# Patient Record
Sex: FEMALE | Race: WHITE | NOT HISPANIC OR LATINO | Employment: OTHER | URBAN - METROPOLITAN AREA
[De-identification: names, ages, dates, MRNs, and addresses within clinical notes are randomized per-mention and may not be internally consistent; named-entity substitution may affect disease eponyms.]

---

## 2019-02-20 ENCOUNTER — HOSPITAL ENCOUNTER (INPATIENT)
Facility: HOSPITAL | Age: 79
LOS: 2 days | Discharge: HOME HEALTH | End: 2019-02-22
Attending: EMERGENCY MEDICINE | Admitting: INTERNAL MEDICINE

## 2019-02-20 ENCOUNTER — APPOINTMENT (OUTPATIENT)
Dept: CT IMAGING | Facility: HOSPITAL | Age: 79
End: 2019-02-20

## 2019-02-20 ENCOUNTER — APPOINTMENT (OUTPATIENT)
Dept: GENERAL RADIOLOGY | Facility: HOSPITAL | Age: 79
End: 2019-02-20

## 2019-02-20 DIAGNOSIS — Z74.09 IMPAIRED FUNCTIONAL MOBILITY, BALANCE, GAIT, AND ENDURANCE: ICD-10-CM

## 2019-02-20 DIAGNOSIS — R09.02 HYPOXIA: ICD-10-CM

## 2019-02-20 DIAGNOSIS — J18.9 PNEUMONIA OF RIGHT LOWER LOBE DUE TO INFECTIOUS ORGANISM: ICD-10-CM

## 2019-02-20 DIAGNOSIS — I50.9 ACUTE ON CHRONIC CONGESTIVE HEART FAILURE, UNSPECIFIED HEART FAILURE TYPE (HCC): Primary | ICD-10-CM

## 2019-02-20 DIAGNOSIS — Z74.09 IMPAIRED MOBILITY AND ADLS: ICD-10-CM

## 2019-02-20 DIAGNOSIS — Z78.9 IMPAIRED MOBILITY AND ADLS: ICD-10-CM

## 2019-02-20 PROBLEM — K76.89 LIVER NODULE: Status: ACTIVE | Noted: 2019-02-20

## 2019-02-20 PROBLEM — I10 ESSENTIAL HYPERTENSION: Status: ACTIVE | Noted: 2019-02-20

## 2019-02-20 PROBLEM — I25.10 CAD (CORONARY ARTERY DISEASE): Status: ACTIVE | Noted: 2019-02-20

## 2019-02-20 PROBLEM — I48.91 A-FIB: Status: ACTIVE | Noted: 2019-02-20

## 2019-02-20 PROBLEM — J90 PLEURAL EFFUSION, BILATERAL: Status: ACTIVE | Noted: 2019-02-20

## 2019-02-20 PROBLEM — I31.39 PERICARDIAL EFFUSION: Status: ACTIVE | Noted: 2019-02-20

## 2019-02-20 PROBLEM — R82.71 BACTERIURIA: Status: ACTIVE | Noted: 2019-02-20

## 2019-02-20 LAB
ALBUMIN SERPL-MCNC: 4.23 G/DL (ref 3.2–4.8)
ALBUMIN/GLOB SERPL: 1.6 G/DL (ref 1.5–2.5)
ALP SERPL-CCNC: 89 U/L (ref 25–100)
ALT SERPL W P-5'-P-CCNC: 19 U/L (ref 7–40)
ANION GAP SERPL CALCULATED.3IONS-SCNC: 5 MMOL/L (ref 3–11)
AST SERPL-CCNC: 18 U/L (ref 0–33)
BACTERIA UR QL AUTO: ABNORMAL /HPF
BASOPHILS # BLD AUTO: 0.04 10*3/MM3 (ref 0–0.2)
BASOPHILS NFR BLD AUTO: 0.8 % (ref 0–1)
BILIRUB SERPL-MCNC: 2 MG/DL (ref 0.3–1.2)
BILIRUB UR QL STRIP: NEGATIVE
BNP SERPL-MCNC: 546 PG/ML (ref 0–100)
BUN BLD-MCNC: 14 MG/DL (ref 9–23)
BUN/CREAT SERPL: 17.1 (ref 7–25)
CALCIUM SPEC-SCNC: 10 MG/DL (ref 8.7–10.4)
CHLORIDE SERPL-SCNC: 106 MMOL/L (ref 99–109)
CLARITY UR: CLEAR
CO2 SERPL-SCNC: 26 MMOL/L (ref 20–31)
COLOR UR: YELLOW
CREAT BLD-MCNC: 0.82 MG/DL (ref 0.6–1.3)
D-LACTATE SERPL-SCNC: 1.1 MMOL/L (ref 0.5–2)
DEPRECATED RDW RBC AUTO: 56.4 FL (ref 37–54)
EOSINOPHIL # BLD AUTO: 0.01 10*3/MM3 (ref 0–0.3)
EOSINOPHIL NFR BLD AUTO: 0.2 % (ref 0–3)
ERYTHROCYTE [DISTWIDTH] IN BLOOD BY AUTOMATED COUNT: 15.1 % (ref 11.3–14.5)
FLUAV AG NPH QL: NEGATIVE
FLUBV AG NPH QL IA: NEGATIVE
GFR SERPL CREATININE-BSD FRML MDRD: 68 ML/MIN/1.73
GLOBULIN UR ELPH-MCNC: 2.6 GM/DL
GLUCOSE BLD-MCNC: 108 MG/DL (ref 70–100)
GLUCOSE UR STRIP-MCNC: NEGATIVE MG/DL
HCT VFR BLD AUTO: 41 % (ref 34.5–44)
HGB BLD-MCNC: 13.4 G/DL (ref 11.5–15.5)
HGB UR QL STRIP.AUTO: ABNORMAL
HOLD SPECIMEN: NORMAL
HOLD SPECIMEN: NORMAL
HYALINE CASTS UR QL AUTO: ABNORMAL /LPF
IMM GRANULOCYTES # BLD AUTO: 0.01 10*3/MM3 (ref 0–0.05)
IMM GRANULOCYTES NFR BLD AUTO: 0.2 % (ref 0–0.6)
INR PPP: 1.81 (ref 0.85–1.16)
KETONES UR QL STRIP: NEGATIVE
LEUKOCYTE ESTERASE UR QL STRIP.AUTO: NEGATIVE
LYMPHOCYTES # BLD AUTO: 0.53 10*3/MM3 (ref 0.6–4.8)
LYMPHOCYTES NFR BLD AUTO: 10.8 % (ref 24–44)
MAGNESIUM SERPL-MCNC: 1.9 MG/DL (ref 1.3–2.7)
MCH RBC QN AUTO: 33.1 PG (ref 27–31)
MCHC RBC AUTO-ENTMCNC: 32.7 G/DL (ref 32–36)
MCV RBC AUTO: 101.2 FL (ref 80–99)
MONOCYTES # BLD AUTO: 0.3 10*3/MM3 (ref 0–1)
MONOCYTES NFR BLD AUTO: 6.1 % (ref 0–12)
NEUTROPHILS # BLD AUTO: 4.01 10*3/MM3 (ref 1.5–8.3)
NEUTROPHILS NFR BLD AUTO: 82.1 % (ref 41–71)
NITRITE UR QL STRIP: NEGATIVE
PH UR STRIP.AUTO: <=5 [PH] (ref 5–8)
PLATELET # BLD AUTO: 121 10*3/MM3 (ref 150–450)
PMV BLD AUTO: 10.7 FL (ref 6–12)
POTASSIUM BLD-SCNC: 3.8 MMOL/L (ref 3.5–5.5)
PROCALCITONIN SERPL-MCNC: 0.05 NG/ML
PROT SERPL-MCNC: 6.8 G/DL (ref 5.7–8.2)
PROT UR QL STRIP: NEGATIVE
PROTHROMBIN TIME: 20.2 SECONDS (ref 11.2–14.3)
RBC # BLD AUTO: 4.05 10*6/MM3 (ref 3.89–5.14)
RBC # UR: ABNORMAL /HPF
REF LAB TEST METHOD: ABNORMAL
SODIUM BLD-SCNC: 137 MMOL/L (ref 132–146)
SP GR UR STRIP: 1.02 (ref 1–1.03)
SQUAMOUS #/AREA URNS HPF: ABNORMAL /HPF
TROPONIN I SERPL-MCNC: 0.01 NG/ML (ref 0–0.07)
UROBILINOGEN UR QL STRIP: ABNORMAL
WBC NRBC COR # BLD: 4.89 10*3/MM3 (ref 3.5–10.8)
WBC UR QL AUTO: ABNORMAL /HPF
WHOLE BLOOD HOLD SPECIMEN: NORMAL
WHOLE BLOOD HOLD SPECIMEN: NORMAL

## 2019-02-20 PROCEDURE — 87804 INFLUENZA ASSAY W/OPTIC: CPT | Performed by: EMERGENCY MEDICINE

## 2019-02-20 PROCEDURE — 99285 EMERGENCY DEPT VISIT HI MDM: CPT

## 2019-02-20 PROCEDURE — 84145 PROCALCITONIN (PCT): CPT | Performed by: EMERGENCY MEDICINE

## 2019-02-20 PROCEDURE — 94799 UNLISTED PULMONARY SVC/PX: CPT

## 2019-02-20 PROCEDURE — 93005 ELECTROCARDIOGRAM TRACING: CPT | Performed by: EMERGENCY MEDICINE

## 2019-02-20 PROCEDURE — 81001 URINALYSIS AUTO W/SCOPE: CPT | Performed by: EMERGENCY MEDICINE

## 2019-02-20 PROCEDURE — 25010000002 CEFTRIAXONE PER 250 MG: Performed by: EMERGENCY MEDICINE

## 2019-02-20 PROCEDURE — 99223 1ST HOSP IP/OBS HIGH 75: CPT | Performed by: INTERNAL MEDICINE

## 2019-02-20 PROCEDURE — 83880 ASSAY OF NATRIURETIC PEPTIDE: CPT | Performed by: EMERGENCY MEDICINE

## 2019-02-20 PROCEDURE — 83605 ASSAY OF LACTIC ACID: CPT | Performed by: EMERGENCY MEDICINE

## 2019-02-20 PROCEDURE — 83735 ASSAY OF MAGNESIUM: CPT

## 2019-02-20 PROCEDURE — 85025 COMPLETE CBC W/AUTO DIFF WBC: CPT

## 2019-02-20 PROCEDURE — 71250 CT THORAX DX C-: CPT

## 2019-02-20 PROCEDURE — 25010000002 FUROSEMIDE PER 20 MG: Performed by: EMERGENCY MEDICINE

## 2019-02-20 PROCEDURE — 87040 BLOOD CULTURE FOR BACTERIA: CPT | Performed by: EMERGENCY MEDICINE

## 2019-02-20 PROCEDURE — 80053 COMPREHEN METABOLIC PANEL: CPT

## 2019-02-20 PROCEDURE — 25010000002 AZITHROMYCIN: Performed by: EMERGENCY MEDICINE

## 2019-02-20 PROCEDURE — 85610 PROTHROMBIN TIME: CPT | Performed by: EMERGENCY MEDICINE

## 2019-02-20 PROCEDURE — 71045 X-RAY EXAM CHEST 1 VIEW: CPT

## 2019-02-20 PROCEDURE — 93005 ELECTROCARDIOGRAM TRACING: CPT

## 2019-02-20 PROCEDURE — 84484 ASSAY OF TROPONIN QUANT: CPT

## 2019-02-20 RX ORDER — BACLOFEN 10 MG/1
10 TABLET ORAL 3 TIMES DAILY
COMMUNITY

## 2019-02-20 RX ORDER — WARFARIN SODIUM 5 MG/1
5 TABLET ORAL
COMMUNITY
End: 2022-08-04

## 2019-02-20 RX ORDER — FUROSEMIDE 10 MG/ML
40 INJECTION INTRAMUSCULAR; INTRAVENOUS EVERY 12 HOURS
Status: DISCONTINUED | OUTPATIENT
Start: 2019-02-21 | End: 2019-02-21

## 2019-02-20 RX ORDER — ALPRAZOLAM 0.5 MG/1
0.5 TABLET ORAL DAILY PRN
COMMUNITY

## 2019-02-20 RX ORDER — IPRATROPIUM BROMIDE AND ALBUTEROL SULFATE 2.5; .5 MG/3ML; MG/3ML
3 SOLUTION RESPIRATORY (INHALATION) ONCE
Status: COMPLETED | OUTPATIENT
Start: 2019-02-20 | End: 2019-02-20

## 2019-02-20 RX ORDER — CEFTRIAXONE SODIUM 1 G/50ML
1 INJECTION, SOLUTION INTRAVENOUS EVERY 24 HOURS
Status: DISCONTINUED | OUTPATIENT
Start: 2019-02-21 | End: 2019-02-22 | Stop reason: HOSPADM

## 2019-02-20 RX ORDER — SODIUM CHLORIDE 0.9 % (FLUSH) 0.9 %
3-10 SYRINGE (ML) INJECTION AS NEEDED
Status: DISCONTINUED | OUTPATIENT
Start: 2019-02-20 | End: 2019-02-22 | Stop reason: HOSPADM

## 2019-02-20 RX ORDER — ALPRAZOLAM 0.5 MG/1
0.5 TABLET ORAL DAILY PRN
Status: DISCONTINUED | OUTPATIENT
Start: 2019-02-20 | End: 2019-02-22 | Stop reason: HOSPADM

## 2019-02-20 RX ORDER — FUROSEMIDE 10 MG/ML
20 INJECTION INTRAMUSCULAR; INTRAVENOUS ONCE
Status: COMPLETED | OUTPATIENT
Start: 2019-02-20 | End: 2019-02-20

## 2019-02-20 RX ORDER — AMIODARONE HYDROCHLORIDE 200 MG/1
200 TABLET ORAL DAILY
Status: DISCONTINUED | OUTPATIENT
Start: 2019-02-21 | End: 2019-02-22 | Stop reason: HOSPADM

## 2019-02-20 RX ORDER — SODIUM CHLORIDE 0.9 % (FLUSH) 0.9 %
3 SYRINGE (ML) INJECTION EVERY 12 HOURS SCHEDULED
Status: DISCONTINUED | OUTPATIENT
Start: 2019-02-21 | End: 2019-02-22 | Stop reason: HOSPADM

## 2019-02-20 RX ORDER — NYSTATIN 100000 U/G
CREAM TOPICAL 2 TIMES DAILY
COMMUNITY
End: 2022-08-04

## 2019-02-20 RX ORDER — FUROSEMIDE 40 MG/1
40 TABLET ORAL 3 TIMES WEEKLY
COMMUNITY
End: 2022-08-04

## 2019-02-20 RX ORDER — CEFTRIAXONE SODIUM 1 G/50ML
1 INJECTION, SOLUTION INTRAVENOUS ONCE
Status: COMPLETED | OUTPATIENT
Start: 2019-02-20 | End: 2019-02-20

## 2019-02-20 RX ORDER — SODIUM CHLORIDE 0.9 % (FLUSH) 0.9 %
10 SYRINGE (ML) INJECTION AS NEEDED
Status: DISCONTINUED | OUTPATIENT
Start: 2019-02-20 | End: 2019-02-22 | Stop reason: HOSPADM

## 2019-02-20 RX ORDER — AMIODARONE HYDROCHLORIDE 200 MG/1
200 TABLET ORAL DAILY
COMMUNITY
End: 2022-08-04

## 2019-02-20 RX ORDER — WARFARIN SODIUM 5 MG/1
5 TABLET ORAL
Status: DISCONTINUED | OUTPATIENT
Start: 2019-02-21 | End: 2019-02-21

## 2019-02-20 RX ORDER — LOSARTAN POTASSIUM 50 MG/1
50 TABLET ORAL DAILY
COMMUNITY
End: 2022-08-04

## 2019-02-20 RX ORDER — BACLOFEN 10 MG/1
10 TABLET ORAL 3 TIMES DAILY
Status: DISCONTINUED | OUTPATIENT
Start: 2019-02-21 | End: 2019-02-22 | Stop reason: HOSPADM

## 2019-02-20 RX ORDER — LOSARTAN POTASSIUM 50 MG/1
50 TABLET ORAL DAILY
Status: DISCONTINUED | OUTPATIENT
Start: 2019-02-21 | End: 2019-02-22 | Stop reason: HOSPADM

## 2019-02-20 RX ORDER — ACETAMINOPHEN,DIPHENHYDRAMINE HCL 500; 25 MG/1; MG/1
1 TABLET, FILM COATED ORAL NIGHTLY PRN
COMMUNITY

## 2019-02-20 RX ADMIN — CEFTRIAXONE SODIUM 1 G: 1 INJECTION, SOLUTION INTRAVENOUS at 20:34

## 2019-02-20 RX ADMIN — AZITHROMYCIN MONOHYDRATE 500 MG: 500 INJECTION, POWDER, LYOPHILIZED, FOR SOLUTION INTRAVENOUS at 21:18

## 2019-02-20 RX ADMIN — FUROSEMIDE 20 MG: 10 INJECTION, SOLUTION INTRAMUSCULAR; INTRAVENOUS at 21:13

## 2019-02-20 RX ADMIN — IPRATROPIUM BROMIDE AND ALBUTEROL SULFATE 3 ML: 2.5; .5 SOLUTION RESPIRATORY (INHALATION) at 19:11

## 2019-02-20 NOTE — ED PROVIDER NOTES
Subjective   Evelyn Rogers is a pleasant 78 y.o.female who is a patient of Dr. Powell, Dr. Rubio and Dr. 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 She states that she is previously from [place] and moved to [place] 14 months ago. She lives alone with her cat but her son lives in [place] and works at the [employer]. She states she was last able to go to the store 3 months ago and currently has to hold onto objects while walking room to room in her home but denies the use of a walker or cane. She denies a h/o HEAVEN on CPAP or oxygen use. She denies a h/o COPD but reports a h/o pleural effusion requiring drainage. She states she sees Dr. Thorpe once a month for her anxiety and is on xanax.        She presents to the ED with c/o fatigue with onset 3 weeks ago that has progressively worsened. She reports she developed dysuria and was diagnosed with a UTI 3 weeks ago. She saw Dr. Rubio who she states started her on 3 powerful antibiotics that made her infection worse. She tried using an known prescription substance that she tried to insert into her vagina but was unable to due to stricture so she told Dr. Avina who did a scope of her vagina but she does not recall the results of the scope. She declined additional antibiotic use and has been using an unknown prescription cream that she states has been helping her burning pain to the sides of her vagina.     She developed a cough and congestion 2 weeks ago that has been progressively worsening and significantly worsened last night. She experiences a productive pinkish-yellow sputum production. She has experienced fevers and chills for a week and her temperature is elevated today. She is unsure if she received a flu shot.    She states she has experienced anxiety in the mornings which is not unusual for her but has developed headaches which is new for her. She experiences shortness of  breath and states she can hear crackles occasionally. She reports a h/o left lower extremity after removal of an osteosarcoma 60 years ago.     She experiences intermittent diarrhea and constipation but denies any melena or hematochezia. She experiences right flank pain along with nausea but denies any vomiting or hematemesis.     She reports a h/o lupus but no long sees rheumatology or nephrology. She reports taking steroids many years ago but denies any recent steroid use. She does experience rashes, arthralgias and myalgias.     She states that she had a large hernia where her lower organ were pushed up into her stomach during pregnancy and had surgery in [place] many years ago to correct this.     All other systems have reviewed and are negative unless as noted above.           History provided by:  Patient  Fatigue   Location:  Generalized  Quality:  Fatigue  Severity:  Moderate  Onset quality:  Gradual  Duration:  2 weeks  Timing:  Constant  Progression:  Worsening  Chronicity:  New  Context:  Recent UTI abx not effective, worsening cough, congestion, fevers, chills, decreased appetite and intake  Relieved by:  None  Worsened by:  None  Ineffective treatments:  ABX, rest  Associated symptoms: congestion, cough, diarrhea, fatigue, fever, rhinorrhea and shortness of breath    Associated symptoms: no abdominal pain, no chest pain, no nausea and no vomiting    Risk factors:  Lupus      Review of Systems   Constitutional: Positive for appetite change, chills, fatigue and fever.   HENT: Positive for congestion and rhinorrhea.    Respiratory: Positive for cough and shortness of breath.    Cardiovascular: Negative for chest pain.   Gastrointestinal: Positive for constipation and diarrhea. Negative for abdominal pain, nausea and vomiting.   Genitourinary: Positive for dysuria.   All other systems reviewed and are negative.      No past medical history on file.    Allergies   Allergen Reactions   • Other Unknown (See  Comments)     SOMETHING THAT SHE GOT DURING AN MRI       No past surgical history on file.    No family history on file.    Social History     Socioeconomic History   • Marital status:      Spouse name: Not on file   • Number of children: Not on file   • Years of education: Not on file   • Highest education level: Not on file         Objective   Physical Exam   Constitutional: She is oriented to person, place, and time. She appears well-developed and well-nourished. No distress.   Alert, oriented and in no acute distress.    HENT:   Head: Normocephalic and atraumatic.   Right Ear: External ear normal.   Left Ear: External ear normal.   Nose: Nose normal.   Mouth/Throat: Oropharynx is clear and moist.   Uvula midline, normal oropharynx and nasal pharynx.    Eyes: Conjunctivae and EOM are normal. Pupils are equal, round, and reactive to light. Right eye exhibits no discharge. Left eye exhibits no discharge. No scleral icterus.   Neck: Normal range of motion. Neck supple. No JVD present. Carotid bruit is not present.   No adenopathy, JVD, bruits or thyromegaly.    Cardiovascular: Normal rate, regular rhythm and intact distal pulses. Exam reveals distant heart sounds. Exam reveals no gallop and no friction rub.   No murmur heard.  Regular rate and rhythm. Distant heart sounds. No murmurs, rubs, gallops or heaves.    Pulmonary/Chest: Effort normal. No stridor. No respiratory distress. She has no wheezes. She has no rales.   Bibasilar crackles worse on the left than right.     Abdominal: Soft. Bowel sounds are normal. She exhibits no distension and no mass. There is no tenderness. There is no rebound and no guarding.   Positive bowel sounds, soft, non tender. No organomegaly or hepatosplenomegaly.   Flanks are without masses or rashes.   Mildly obese.    Musculoskeletal: Normal range of motion. She exhibits edema. She exhibits no tenderness.   Trace edema with left worse than right which is chronic per the  patient.   No rash, synovitis.   Axilla is without rashes or masses.    Lymphadenopathy:     She has no cervical adenopathy.   Neurological: She is alert and oriented to person, place, and time. No cranial nerve deficit or sensory deficit. Coordination normal.   Face symmetric, tongue midline, voice strong, hearing, vision and speech preserved. Equal strength to upper and lower extremities. Sensation intact.   Mild global weakness.    Skin: Skin is warm and dry. Capillary refill takes less than 2 seconds. No rash noted. She is not diaphoretic. No erythema.   Psychiatric: She has a normal mood and affect. Her behavior is normal.   Nursing note and vitals reviewed.      Procedures         ED Course      Recent Results (from the past 24 hour(s))   Comprehensive Metabolic Panel    Collection Time: 02/20/19  4:53 PM   Result Value Ref Range    Glucose 108 (H) 70 - 100 mg/dL    BUN 14 9 - 23 mg/dL    Creatinine 0.82 0.60 - 1.30 mg/dL    Sodium 137 132 - 146 mmol/L    Potassium 3.8 3.5 - 5.5 mmol/L    Chloride 106 99 - 109 mmol/L    CO2 26.0 20.0 - 31.0 mmol/L    Calcium 10.0 8.7 - 10.4 mg/dL    Total Protein 6.8 5.7 - 8.2 g/dL    Albumin 4.23 3.20 - 4.80 g/dL    ALT (SGPT) 19 7 - 40 U/L    AST (SGOT) 18 0 - 33 U/L    Alkaline Phosphatase 89 25 - 100 U/L    Total Bilirubin 2.0 (H) 0.3 - 1.2 mg/dL    eGFR Non African Amer 68 >60 mL/min/1.73    Globulin 2.6 gm/dL    A/G Ratio 1.6 1.5 - 2.5 g/dL    BUN/Creatinine Ratio 17.1 7.0 - 25.0    Anion Gap 5.0 3.0 - 11.0 mmol/L   Magnesium    Collection Time: 02/20/19  4:53 PM   Result Value Ref Range    Magnesium 1.9 1.3 - 2.7 mg/dL   Light Blue Top    Collection Time: 02/20/19  4:53 PM   Result Value Ref Range    Extra Tube hold for add-on    Green Top (Gel)    Collection Time: 02/20/19  4:53 PM   Result Value Ref Range    Extra Tube Hold for add-ons.    Lavender Top    Collection Time: 02/20/19  4:53 PM   Result Value Ref Range    Extra Tube hold for add-on    Gold Top - SST     Collection Time: 02/20/19  4:53 PM   Result Value Ref Range    Extra Tube Hold for add-ons.    CBC Auto Differential    Collection Time: 02/20/19  4:53 PM   Result Value Ref Range    WBC 4.89 3.50 - 10.80 10*3/mm3    RBC 4.05 3.89 - 5.14 10*6/mm3    Hemoglobin 13.4 11.5 - 15.5 g/dL    Hematocrit 41.0 34.5 - 44.0 %    .2 (H) 80.0 - 99.0 fL    MCH 33.1 (H) 27.0 - 31.0 pg    MCHC 32.7 32.0 - 36.0 g/dL    RDW 15.1 (H) 11.3 - 14.5 %    RDW-SD 56.4 (H) 37.0 - 54.0 fl    MPV 10.7 6.0 - 12.0 fL    Platelets 121 (L) 150 - 450 10*3/mm3    Neutrophil % 82.1 (H) 41.0 - 71.0 %    Lymphocyte % 10.8 (L) 24.0 - 44.0 %    Monocyte % 6.1 0.0 - 12.0 %    Eosinophil % 0.2 0.0 - 3.0 %    Basophil % 0.8 0.0 - 1.0 %    Immature Grans % 0.2 0.0 - 0.6 %    Neutrophils, Absolute 4.01 1.50 - 8.30 10*3/mm3    Lymphocytes, Absolute 0.53 (L) 0.60 - 4.80 10*3/mm3    Monocytes, Absolute 0.30 0.00 - 1.00 10*3/mm3    Eosinophils, Absolute 0.01 0.00 - 0.30 10*3/mm3    Basophils, Absolute 0.04 0.00 - 0.20 10*3/mm3    Immature Grans, Absolute 0.01 0.00 - 0.05 10*3/mm3   Procalcitonin    Collection Time: 02/20/19  4:53 PM   Result Value Ref Range    Procalcitonin 0.05 <=0.25 ng/mL   POC Troponin, Rapid    Collection Time: 02/20/19  4:54 PM   Result Value Ref Range    Troponin I 0.01 0.00 - 0.07 ng/mL   Urinalysis With Microscopic If Indicated (No Culture) - Urine, Catheter    Collection Time: 02/20/19  5:44 PM   Result Value Ref Range    Color, UA Yellow Yellow, Straw    Appearance, UA Clear Clear    pH, UA <=5.0 5.0 - 8.0    Specific Gravity, UA 1.016 1.001 - 1.030    Glucose, UA Negative Negative    Ketones, UA Negative Negative    Bilirubin, UA Negative Negative    Blood, UA Trace (A) Negative    Protein, UA Negative Negative    Leuk Esterase, UA Negative Negative    Nitrite, UA Negative Negative    Urobilinogen, UA 0.2 E.U./dL 0.2 - 1.0 E.U./dL   Urinalysis, Microscopic Only - Urine, Catheter    Collection Time: 02/20/19  5:44 PM   Result  Value Ref Range    RBC, UA 3-6 (A) None Seen, 0-2 /HPF    WBC, UA 0-2 None Seen, 0-2 /HPF    Bacteria, UA 4+ (A) None Seen, Trace /HPF    Squamous Epithelial Cells, UA 0-2 None Seen, 0-2 /HPF    Hyaline Casts, UA 0-6 0 - 6 /LPF    Methodology Automated Microscopy    Influenza Antigen, Rapid - Swab, Nasopharynx    Collection Time: 02/20/19  5:47 PM   Result Value Ref Range    Influenza A Ag, EIA Negative Negative    Influenza B Ag, EIA Negative Negative   Lactic Acid, Plasma    Collection Time: 02/20/19  6:09 PM   Result Value Ref Range    Lactate 1.1 0.5 - 2.0 mmol/L   BNP    Collection Time: 02/20/19  6:55 PM   Result Value Ref Range    .0 (H) 0.0 - 100.0 pg/mL     Note: In addition to lab results from this visit, the labs listed above may include labs taken at another facility or during a different encounter within the last 24 hours. Please correlate lab times with ED admission and discharge times for further clarification of the services performed during this visit.    CT Chest Without Contrast   Final Result   1. Mild cardiomegaly. CAD. Small pericardial effusion.    2. Trace bilateral pleural effusions.    3. Left hepatic 6.5 cm nodule, probably cyst.    4. Mild patchy RLL infiltrate. Clinically correlates to rule out pneumonia.    5. Minimal bilateral scattered subsegmental atelectasis.    6. Additional findings, as above.       THIS DOCUMENT HAS BEEN ELECTRONICALLY SIGNED BY RULA FAITH MD      XR Chest 1 View   Final Result   Cardiomegaly. Favor trace edema and left effusion.       DICTATED:   2/20/2019   EDITED/ls :   2/20/2019        This report was finalized on 2/20/2019 5:32 PM by Galo Bautista.            Vitals:    02/20/19 1911 02/20/19 1915 02/20/19 1930 02/20/19 2113   BP:  143/98 (!) 151/107 (P) 164/78   BP Location:       Patient Position:       Pulse: 75  74 (P) 71   Resp: 20      Temp:       TempSrc:       SpO2: 95% 99% 93%    Weight:       Height:         Medications   sodium chloride  0.9 % flush 10 mL (not administered)   azithromycin 500 MG/250 ML 0.9% NS IVPB (MBP) (not administered)   furosemide (LASIX) injection 20 mg (not administered)   ipratropium-albuterol (DUO-NEB) nebulizer solution 3 mL (3 mL Nebulization Given 2/20/19 1911)   cefTRIAXone (ROCEPHIN) IVPB 1 g (0 g Intravenous Stopped 2/20/19 2104)     ECG/EMG Results (last 24 hours)     Procedure Component Value Units Date/Time    ECG 12 Lead [[id number]] Collected:  02/20/19 1527     Updated:  02/20/19 1903    Narrative:       Test Reason : Weak/Dizzy/AMS protocol  Blood Pressure : **/** mmHG  Vent. Rate : 068 BPM     Atrial Rate : 043 BPM     P-R Int : 000 ms          QRS Dur : 092 ms      QT Int : 412 ms       P-R-T Axes : 000 050 022 degrees     QTc Int : 438 ms    sinus rythym with frequent pvc's and baseline arftifact make interpretation  difficult    Low voltage QRS  prwp    Abnormal ECG  No previous ECGs available  Confirmed by OSEI BROWN MD ([id number]) on 2/20/2019 7:03:49 PM    Referred By:  EDMD           Confirmed By:OSEI BROWN MD        ECG 12 Lead   Final Result   Test Reason : Weak/Dizzy/AMS protocol   Blood Pressure : **/** mmHG   Vent. Rate : 068 BPM     Atrial Rate : 043 BPM      P-R Int : 000 ms          QRS Dur : 092 ms       QT Int : 412 ms       P-R-T Axes : 000 050 022 degrees      QTc Int : 438 ms      sinus rythym with frequent pvc's and baseline arftifact make    interpretation   difficult      Low voltage QRS   prwp      Abnormal ECG   No previous ECGs available   Confirmed by OSEI BROWN MD ([id number]) on 2/20/2019 7:03:49 PM      Referred By:  EDMD           Confirmed By:OSEI BROWN MD                            MDM  Number of Diagnoses or Management Options  Acute on chronic congestive heart failure, unspecified heart failure type (CMS/HCC):   Hypoxia:   Pneumonia of right lower lobe due to infectious organism (CMS/HCC):   Diagnosis management comments:     I reviewed all available studies at the bedside  with the patient and her son.  The patient is a poor historian but the son is able to set some light on her history and does say she had heart failure.  She apparently is on Coumadin as her anticoagulation I had ordered an INR on the patient.  She also has paroxysmal A. fib.    I think today she has a combination of both heart failure and pneumonia causing her symptoms.  This is revealed on noncontrast CT of the chest and her BNP is elevated.  It rest while awake the patient's oxygen saturation was 93-94% which he fell asleep it dropped to about 88% on room air.    The she needs to be admitted to the hospital for serial exams and ongoing evaluation.  We do not have an ejection fraction on the patient.    I spoke to Dr. Hanson, on call hospital medicine, short of the patient.    All are agreeable with plan         Amount and/or Complexity of Data Reviewed  Clinical lab tests: reviewed  Tests in the radiology section of CPT®: reviewed  Tests in the medicine section of CPT®: reviewed        Final diagnoses:   Acute on chronic congestive heart failure, unspecified heart failure type (CMS/HCC)   Pneumonia of right lower lobe due to infectious organism (CMS/HCC)   Hypoxia       Documentation assistance provided by elana Murry.  Information recorded by the scribe was done at my direction and has been verified and validated by me.     Johnnie Murry  02/20/19 1934       Jayce Lopez MD  02/20/19 2114

## 2019-02-21 ENCOUNTER — APPOINTMENT (OUTPATIENT)
Dept: CARDIOLOGY | Facility: HOSPITAL | Age: 79
End: 2019-02-21

## 2019-02-21 LAB
ANION GAP SERPL CALCULATED.3IONS-SCNC: 6 MMOL/L (ref 3–11)
ANION GAP SERPL CALCULATED.3IONS-SCNC: 6 MMOL/L (ref 3–11)
BH CV ECHO MEAS - AO ROOT AREA (BSA CORRECTED): 1.4
BH CV ECHO MEAS - AO ROOT AREA: 5.5 CM^2
BH CV ECHO MEAS - AO ROOT DIAM: 2.6 CM
BH CV ECHO MEAS - BSA(HAYCOCK): 2 M^2
BH CV ECHO MEAS - BSA: 1.9 M^2
BH CV ECHO MEAS - BZI_BMI: 29.9 KILOGRAMS/M^2
BH CV ECHO MEAS - BZI_METRIC_HEIGHT: 167.6 CM
BH CV ECHO MEAS - BZI_METRIC_WEIGHT: 83.9 KG
BH CV ECHO MEAS - EDV(CUBED): 92.6 ML
BH CV ECHO MEAS - EDV(TEICH): 93.6 ML
BH CV ECHO MEAS - EF(CUBED): 84.4 %
BH CV ECHO MEAS - EF(TEICH): 77.7 %
BH CV ECHO MEAS - ESV(CUBED): 14.4 ML
BH CV ECHO MEAS - ESV(TEICH): 20.9 ML
BH CV ECHO MEAS - FS: 46.2 %
BH CV ECHO MEAS - IVS/LVPW: 1
BH CV ECHO MEAS - IVSD: 0.96 CM
BH CV ECHO MEAS - LA DIMENSION: 4 CM
BH CV ECHO MEAS - LA/AO: 1.5
BH CV ECHO MEAS - LAD MAJOR: 7.6 CM
BH CV ECHO MEAS - LAT PEAK E' VEL: 8.1 CM/SEC
BH CV ECHO MEAS - LATERAL E/E' RATIO: 10.8
BH CV ECHO MEAS - LV MASS(C)D: 144.3 GRAMS
BH CV ECHO MEAS - LV MASS(C)DI: 74.6 GRAMS/M^2
BH CV ECHO MEAS - LVIDD: 4.5 CM
BH CV ECHO MEAS - LVIDS: 2.4 CM
BH CV ECHO MEAS - LVPWD: 0.94 CM
BH CV ECHO MEAS - MED PEAK E' VEL: 6.5 CM/SEC
BH CV ECHO MEAS - MEDIAL E/E' RATIO: 9.5
BH CV ECHO MEAS - MV A MAX VEL: 49.9 CM/SEC
BH CV ECHO MEAS - MV DEC SLOPE: 208.5 CM/SEC^2
BH CV ECHO MEAS - MV DEC TIME: 0.26 SEC
BH CV ECHO MEAS - MV E MAX VEL: 87.4 CM/SEC
BH CV ECHO MEAS - MV E/A: 1.8
BH CV ECHO MEAS - MV P1/2T MAX VEL: 97.2 CM/SEC
BH CV ECHO MEAS - MV P1/2T: 136.5 MSEC
BH CV ECHO MEAS - MVA P1/2T LCG: 2.3 CM^2
BH CV ECHO MEAS - MVA(P1/2T): 1.6 CM^2
BH CV ECHO MEAS - PA ACC SLOPE: 570.1 CM/SEC^2
BH CV ECHO MEAS - PA ACC TIME: 0.16 SEC
BH CV ECHO MEAS - PA PR(ACCEL): 7.7 MMHG
BH CV ECHO MEAS - PI END-D VEL: 121 CM/SEC
BH CV ECHO MEAS - RAP SYSTOLE: 7 MMHG
BH CV ECHO MEAS - RV MAX PG: 1.7 MMHG
BH CV ECHO MEAS - RV V1 MAX: 65.2 CM/SEC
BH CV ECHO MEAS - RVSP: 55 MMHG
BH CV ECHO MEAS - SI(CUBED): 40.4 ML/M^2
BH CV ECHO MEAS - SI(TEICH): 37.6 ML/M^2
BH CV ECHO MEAS - SV(CUBED): 78.2 ML
BH CV ECHO MEAS - SV(TEICH): 72.8 ML
BH CV ECHO MEAS - TAPSE (>1.6): 2.4 CM2
BH CV ECHO MEAS - TR MAX PG: 48 MMHG
BH CV ECHO MEAS - TR MAX VEL: 307.6 CM/SEC
BH CV ECHO MEASUREMENTS AVERAGE E/E' RATIO: 11.97
BH CV XLRA - RV BASE: 4.5 CM
BH CV XLRA - RV LENGTH: 7.6 CM
BH CV XLRA - RV MID: 3.9 CM
BH CV XLRA - TDI S': 16 CM/SEC
BUN BLD-MCNC: 14 MG/DL (ref 9–23)
BUN BLD-MCNC: 16 MG/DL (ref 9–23)
BUN/CREAT SERPL: 17.3 (ref 7–25)
BUN/CREAT SERPL: 20.8 (ref 7–25)
CALCIUM SPEC-SCNC: 9.3 MG/DL (ref 8.7–10.4)
CALCIUM SPEC-SCNC: 9.3 MG/DL (ref 8.7–10.4)
CHLORIDE SERPL-SCNC: 103 MMOL/L (ref 99–109)
CHLORIDE SERPL-SCNC: 104 MMOL/L (ref 99–109)
CO2 SERPL-SCNC: 26 MMOL/L (ref 20–31)
CO2 SERPL-SCNC: 27 MMOL/L (ref 20–31)
CREAT BLD-MCNC: 0.77 MG/DL (ref 0.6–1.3)
CREAT BLD-MCNC: 0.81 MG/DL (ref 0.6–1.3)
FLUAV SUBTYP SPEC NAA+PROBE: NOT DETECTED
FLUBV RNA ISLT QL NAA+PROBE: NOT DETECTED
GFR SERPL CREATININE-BSD FRML MDRD: 69 ML/MIN/1.73
GFR SERPL CREATININE-BSD FRML MDRD: 73 ML/MIN/1.73
GLUCOSE BLD-MCNC: 105 MG/DL (ref 70–100)
GLUCOSE BLD-MCNC: 122 MG/DL (ref 70–100)
HBA1C MFR BLD: 4.8 % (ref 4.8–5.6)
INR PPP: 1.67 (ref 0.85–1.16)
INR PPP: 1.71 (ref 0.85–1.16)
LEFT ATRIUM VOLUME INDEX: 49.6 ML/M^2
LEFT ATRIUM VOLUME: 96 ML
MAGNESIUM SERPL-MCNC: 1.8 MG/DL (ref 1.3–2.7)
MAGNESIUM SERPL-MCNC: 1.8 MG/DL (ref 1.3–2.7)
MAXIMAL PREDICTED HEART RATE: 143 BPM
POTASSIUM BLD-SCNC: 3.3 MMOL/L (ref 3.5–5.5)
POTASSIUM BLD-SCNC: 3.4 MMOL/L (ref 3.5–5.5)
POTASSIUM BLD-SCNC: 4.1 MMOL/L (ref 3.5–5.5)
PROTHROMBIN TIME: 18.9 SECONDS (ref 11.2–14.3)
PROTHROMBIN TIME: 19.3 SECONDS (ref 11.2–14.3)
SODIUM BLD-SCNC: 135 MMOL/L (ref 132–146)
SODIUM BLD-SCNC: 137 MMOL/L (ref 132–146)
STRESS TARGET HR: 122 BPM
TROPONIN I SERPL-MCNC: 0.06 NG/ML
TROPONIN I SERPL-MCNC: 0.08 NG/ML
TROPONIN I SERPL-MCNC: 0.1 NG/ML

## 2019-02-21 PROCEDURE — 93005 ELECTROCARDIOGRAM TRACING: CPT | Performed by: INTERNAL MEDICINE

## 2019-02-21 PROCEDURE — 80048 BASIC METABOLIC PNL TOTAL CA: CPT | Performed by: INTERNAL MEDICINE

## 2019-02-21 PROCEDURE — 99233 SBSQ HOSP IP/OBS HIGH 50: CPT | Performed by: INTERNAL MEDICINE

## 2019-02-21 PROCEDURE — 84484 ASSAY OF TROPONIN QUANT: CPT | Performed by: INTERNAL MEDICINE

## 2019-02-21 PROCEDURE — 93306 TTE W/DOPPLER COMPLETE: CPT | Performed by: INTERNAL MEDICINE

## 2019-02-21 PROCEDURE — 25010000002 FUROSEMIDE PER 20 MG: Performed by: INTERNAL MEDICINE

## 2019-02-21 PROCEDURE — 87502 INFLUENZA DNA AMP PROBE: CPT | Performed by: INTERNAL MEDICINE

## 2019-02-21 PROCEDURE — 93010 ELECTROCARDIOGRAM REPORT: CPT | Performed by: INTERNAL MEDICINE

## 2019-02-21 PROCEDURE — 83735 ASSAY OF MAGNESIUM: CPT | Performed by: INTERNAL MEDICINE

## 2019-02-21 PROCEDURE — 25010000002 MAGNESIUM SULFATE 2 GM/50ML SOLUTION: Performed by: INTERNAL MEDICINE

## 2019-02-21 PROCEDURE — 97162 PT EVAL MOD COMPLEX 30 MIN: CPT

## 2019-02-21 PROCEDURE — 85610 PROTHROMBIN TIME: CPT | Performed by: INTERNAL MEDICINE

## 2019-02-21 PROCEDURE — 83036 HEMOGLOBIN GLYCOSYLATED A1C: CPT | Performed by: INTERNAL MEDICINE

## 2019-02-21 PROCEDURE — 93306 TTE W/DOPPLER COMPLETE: CPT

## 2019-02-21 PROCEDURE — 99223 1ST HOSP IP/OBS HIGH 75: CPT | Performed by: INTERNAL MEDICINE

## 2019-02-21 PROCEDURE — 84132 ASSAY OF SERUM POTASSIUM: CPT | Performed by: INTERNAL MEDICINE

## 2019-02-21 PROCEDURE — 25010000002 CEFTRIAXONE PER 250 MG: Performed by: INTERNAL MEDICINE

## 2019-02-21 RX ORDER — MAGNESIUM SULFATE 1 G/100ML
1 INJECTION INTRAVENOUS AS NEEDED
Status: DISCONTINUED | OUTPATIENT
Start: 2019-02-21 | End: 2019-02-22 | Stop reason: HOSPADM

## 2019-02-21 RX ORDER — ACETAMINOPHEN 325 MG/1
650 TABLET ORAL EVERY 6 HOURS PRN
Status: DISCONTINUED | OUTPATIENT
Start: 2019-02-21 | End: 2019-02-22 | Stop reason: HOSPADM

## 2019-02-21 RX ORDER — POTASSIUM CHLORIDE 7.45 MG/ML
10 INJECTION INTRAVENOUS
Status: DISCONTINUED | OUTPATIENT
Start: 2019-02-21 | End: 2019-02-22 | Stop reason: HOSPADM

## 2019-02-21 RX ORDER — FUROSEMIDE 40 MG/1
40 TABLET ORAL 3 TIMES WEEKLY
Status: DISCONTINUED | OUTPATIENT
Start: 2019-02-22 | End: 2019-02-22 | Stop reason: HOSPADM

## 2019-02-21 RX ORDER — FUROSEMIDE 10 MG/ML
40 INJECTION INTRAMUSCULAR; INTRAVENOUS ONCE
Status: COMPLETED | OUTPATIENT
Start: 2019-02-21 | End: 2019-02-21

## 2019-02-21 RX ORDER — MAGNESIUM SULFATE HEPTAHYDRATE 40 MG/ML
2 INJECTION, SOLUTION INTRAVENOUS AS NEEDED
Status: DISCONTINUED | OUTPATIENT
Start: 2019-02-21 | End: 2019-02-22 | Stop reason: HOSPADM

## 2019-02-21 RX ORDER — MAGNESIUM SULFATE HEPTAHYDRATE 40 MG/ML
4 INJECTION, SOLUTION INTRAVENOUS AS NEEDED
Status: DISCONTINUED | OUTPATIENT
Start: 2019-02-21 | End: 2019-02-22 | Stop reason: HOSPADM

## 2019-02-21 RX ORDER — POTASSIUM CHLORIDE 1.5 G/1.77G
40 POWDER, FOR SOLUTION ORAL AS NEEDED
Status: DISCONTINUED | OUTPATIENT
Start: 2019-02-21 | End: 2019-02-22 | Stop reason: HOSPADM

## 2019-02-21 RX ORDER — POTASSIUM CHLORIDE 750 MG/1
40 CAPSULE, EXTENDED RELEASE ORAL AS NEEDED
Status: DISCONTINUED | OUTPATIENT
Start: 2019-02-21 | End: 2019-02-22 | Stop reason: HOSPADM

## 2019-02-21 RX ADMIN — DOXYCYCLINE 100 MG: 100 INJECTION, POWDER, LYOPHILIZED, FOR SOLUTION INTRAVENOUS at 17:31

## 2019-02-21 RX ADMIN — LOSARTAN POTASSIUM 50 MG: 50 TABLET ORAL at 08:33

## 2019-02-21 RX ADMIN — FUROSEMIDE 40 MG: 10 INJECTION, SOLUTION INTRAMUSCULAR; INTRAVENOUS at 10:12

## 2019-02-21 RX ADMIN — POTASSIUM CHLORIDE 40 MEQ: 750 CAPSULE, EXTENDED RELEASE ORAL at 14:10

## 2019-02-21 RX ADMIN — DOXYCYCLINE 100 MG: 100 INJECTION, POWDER, LYOPHILIZED, FOR SOLUTION INTRAVENOUS at 05:45

## 2019-02-21 RX ADMIN — BACLOFEN 10 MG: 10 TABLET ORAL at 21:46

## 2019-02-21 RX ADMIN — CEFTRIAXONE SODIUM 1 G: 1 INJECTION, SOLUTION INTRAVENOUS at 22:42

## 2019-02-21 RX ADMIN — BACLOFEN 10 MG: 10 TABLET ORAL at 17:31

## 2019-02-21 RX ADMIN — SODIUM CHLORIDE, PRESERVATIVE FREE 3 ML: 5 INJECTION INTRAVENOUS at 21:46

## 2019-02-21 RX ADMIN — ACETAMINOPHEN 650 MG: 325 TABLET, FILM COATED ORAL at 22:41

## 2019-02-21 RX ADMIN — POTASSIUM CHLORIDE 40 MEQ: 750 CAPSULE, EXTENDED RELEASE ORAL at 10:12

## 2019-02-21 RX ADMIN — ALPRAZOLAM 0.5 MG: 0.5 TABLET ORAL at 22:18

## 2019-02-21 RX ADMIN — ACETAMINOPHEN 650 MG: 325 TABLET, FILM COATED ORAL at 02:51

## 2019-02-21 RX ADMIN — BACLOFEN 10 MG: 10 TABLET ORAL at 08:33

## 2019-02-21 RX ADMIN — MAGNESIUM SULFATE HEPTAHYDRATE 2 G: 40 INJECTION, SOLUTION INTRAVENOUS at 10:12

## 2019-02-21 NOTE — H&P
[facility]  HISTORY AND PHYSICAL    Patient Name: Evelyn Rogers  : 1940  MRN: [id number]  Primary Care Physician: Provider, No Known  Date of admission: 2019      Subjective   Subjective     Chief Complaint:  Fatigue    HPI:  Evelyn Rogers is a 78 y.o. 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  She states that she is experiencing generalized weakness and has had to hold on to the wall when walking.  Patient was treated for a UTI 3 weeks ago by her PCP.  Two weeks ago she developed congestion and a productive cough with white/yellow sputum that significantly worsened last night.  This weeks she is experiencing chills and an elevated temperature yesterday.  She also reports having shortness of air, headaches, nausea, right flank pain, dysuria, intermittent diarrhea and constipation.  She denies chest pain, vomiting, abdominal pain, melena, or any other complaints at this time.  CT of the chest shows a small pericardial effusion, trace bilateral pleural effusions, left hepatic 6.5 cm nodule, and patchy RLL infiltrate.  Patient was given Lasix 20 mg IV x 1 dose, and started on rocephin and azithromycin in the ED.  Patient is being admitted to the Hospitalist for further evaluation and management.      79 YO F RECENTLY TREATED FOR UTI BUT STILL NOT FEELING WELL AND HAS HAD COUGH PRODUCTIVE OF PINK SPUTUM, CONGESTION, SHORTNESS OF BREATH.  STILL HAVING PAINFUL URINATION.  NO F/C.  NO SIGNIFICANT LEG SWELLING AND STATES LLE CHRONICALLY SWOLLEN S/P OSTEOSARCOMA REMOVAL 60 YRS AGO.  HAS HAD CHILLS AND ?FEVER.      Review of Systems   Constitutional: Positive for activity change, appetite change, chills, fatigue and fever. Negative for diaphoresis.   HENT: Positive for congestion.     Eyes: Negative.    Respiratory: Positive for cough and shortness of breath. Negative for wheezing.    Cardiovascular: Positive for leg swelling (chronic). Negative for chest pain and palpitations.   Gastrointestinal: Positive for constipation, diarrhea, nausea and vomiting. Negative for abdominal distention and abdominal pain.   Endocrine: Negative.    Genitourinary: Positive for dysuria. Negative for hematuria.   Musculoskeletal: Negative.    Skin: Negative.    Allergic/Immunologic: Negative.    Neurological: Negative.    Hematological: Negative.    Psychiatric/Behavioral: Negative.         Otherwise complete ROS reviewed and is negative except as mentioned in the HPI.    Personal History     Past Medical History:   Diagnosis Date   • CAD (coronary artery disease)    • CHF (congestive heart failure) (CMS/[facility])    • Hypertension        No past surgical history on file.    Family History: family history is not on file. Otherwise pertinent FHx was reviewed and unremarkable.     Social History:  reports that  has never smoked. she has never used smokeless tobacco. She reports that she does not drink alcohol or use drugs.  Social History     Social History Narrative   • Not on file       Medications:    Available home medication information reviewed.  Medications Prior to Admission   Medication Sig Dispense Refill Last Dose   • ALPRAZolam (XANAX) 0.5 MG tablet Take 0.5 mg by mouth Daily As Needed for Anxiety.   2/19/2019 at Unknown time   • amiodarone (PACERONE) 200 MG tablet Take 200 mg by mouth Daily.   2/20/2019 at Unknown time   • baclofen (LIORESAL) 10 MG tablet Take 10 mg by mouth 3 (Three) Times a Day.   2/20/2019 at 0900   • diphenhydrAMINE-acetaminophen (TYLENOL PM)  MG tablet per tablet Take 1 tablet by mouth Every Night.   2/19/2019 at 2100   • furosemide (LASIX) 40 MG tablet Take 40 mg by mouth 3 (Three) Times a Week. Monday Wednesday Friday 2/20/2019 at Unknown time   • losartan (COZAAR) 50 MG  tablet Take 50 mg by mouth Daily.   2/20/2019 at Unknown time   • warfarin (COUMADIN) 5 MG tablet Take 5 mg by mouth Daily.   2/20/2019 at Unknown time   • nystatin (MYCOSTATIN) 955152 UNIT/GM cream Apply  topically to the appropriate area as directed 2 (Two) Times a Day.   Unknown at Unknown time       Allergies   Allergen Reactions   • Other Unknown (See Comments)     SOMETHING THAT SHE GOT DURING AN MRI       Objective   Objective     Vital Signs:   Temp:  [99.8 °F (37.7 °C)-100.4 °F (38 °C)] 99.8 °F (37.7 °C)  Heart Rate:  [67-77] 71  Resp:  [18-20] 18  BP: (125-166)/() 166/72        Physical Exam   Constitutional: She is oriented to person, place, and time. She appears well-developed. No distress.   HENT:   Head: Normocephalic.   Eyes: Pupils are equal, round, and reactive to light.   Neck: Normal range of motion. Neck supple.   Cardiovascular: Normal rate, regular rhythm, normal heart sounds and intact distal pulses. Exam reveals no gallop.   No murmur heard.  Pulmonary/Chest: Effort normal and breath sounds normal. No stridor. No respiratory distress. Rales: RLL.   Abdominal: Soft. Bowel sounds are normal. She exhibits no distension and no mass. There is no tenderness. There is no rebound and no guarding.   Musculoskeletal: Normal range of motion. She exhibits edema (mild BLE L>R (chronic)). She exhibits no tenderness or deformity.   Neurological: She is alert and oriented to person, place, and time. No cranial nerve deficit.   Skin: Skin is warm and dry. No rash noted. She is not diaphoretic. No erythema. No pallor.   Psychiatric: She has a normal mood and affect. Her behavior is normal. Judgment and thought content normal.      PRESENT AND PARTICIPATED IN ABOVE EXAM W/ NO CHANGES TO ABOVE.      Results Reviewed:  I have personally reviewed current lab, radiology, and data and agree.    Results from last 7 days   Lab Units 02/20/19  5564 02/20/19  1653   WBC 10*3/mm3  --  4.89   HEMOGLOBIN g/dL  --   13.4   HEMATOCRIT %  --  41.0   PLATELETS 10*3/mm3  --  121*   INR  1.81*  --      Results from last 7 days   Lab Units 02/20/19  1654 02/20/19  1653   SODIUM mmol/L  --  137   POTASSIUM mmol/L  --  3.8   CHLORIDE mmol/L  --  106   CO2 mmol/L  --  26.0   BUN mg/dL  --  14   CREATININE mg/dL  --  0.82   GLUCOSE mg/dL  --  108*   CALCIUM mg/dL  --  10.0   ALT (SGPT) U/L  --  19   AST (SGOT) U/L  --  18   TROPONIN I ng/mL 0.01  --      Estimated Creatinine Clearance: 62.9 mL/min (by C-G formula based on SCr of 0.82 mg/dL).  Brief Urine Lab Results  (Last result in the past 365 days)      Color   Clarity   Blood   Leuk Est   Nitrite   Protein   CREAT   Urine HCG        02/20/19 1744 Yellow Clear Trace Negative Negative Negative             BNP   Date Value Ref Range Status   02/20/2019 546.0 (H) 0.0 - 100.0 pg/mL Final     Comment:     Results may be falsely decreased if patient taking Biotin.     Imaging Results (last 24 hours)     Procedure Component Value Units Date/Time    CT Chest Without Contrast [[id number]] Collected:  02/20/19 1945     Updated:  02/20/19 2039    Narrative:       EXAM:    CT Chest Without Contrast     EXAM DATE/TIME:    2/20/2019 7:45 PM     CLINICAL HISTORY:    78 years old, female; Signs and symptoms; Cough and shortness of breath;   Additional info: Cough, SOB, hypoxia, concern for pneumonia     TECHNIQUE:    Axial computed tomography images of the chest without intravenous contrast.    All CT scans at this facility use at least one of these dose optimization   techniques: automated exposure control; mA and/or kV adjustment per patient   size (includes targeted exams where dose is matched to clinical indication); or   iterative reconstruction.    Coronal reformatted images were created and reviewed.     COMPARISON:    CR XR CHEST 1 VW 2/20/2019 4:21 PM     FINDINGS:    Limitations:  Motion artifact does somewhat limit the sensitivity of this   examination.    Lungs:  Mild patchy RLL  infiltrate. Clinically correlates to rule out   pneumonia. Minimal scattered subsegmental atelectasis in bilateral lungs in   multilobar distribution pattern.    Pleural space:  Trace bilateral pleural effusions.  No pneumothorax.   Heart:  There are coronary artery atheromatous calcifications, consistent with   CAD. Mild cardiomegaly. Small pericardial effusion. Some of the pericardial   effusion is within pericardial recesses.    Mediastinum:  Air in distal esophagus versus minimal hiatal hernia.    Aorta:  Aortic atherosclerosis. No aortic aneurysm.    Lymph nodes:  No gross lymphadenopathy on noncontrast exam.    Bones/joints:  Mild scoliosis. Degenerative changes of the spine.    Soft tissues: Unremarkable.    Liver:  Round hypodense 6.5 cm left hepatic nodule, probably a cyst. US could   characterize.    Gallbladder and bile ducts:  Cholecystectomy.    Spleen:  Unremarkable. No splenomegaly or mass.    Adrenals:  Unremarkable adrenals.    Kidneys and ureters:  Visualized upper kidneys are unremarkable.       Impression:       1. Mild cardiomegaly. CAD. Small pericardial effusion.   2. Trace bilateral pleural effusions.   3. Left hepatic 6.5 cm nodule, probably cyst.   4. Mild patchy RLL infiltrate. Clinically correlates to rule out pneumonia.   5. Minimal bilateral scattered subsegmental atelectasis.   6. Additional findings, as above.     THIS DOCUMENT HAS BEEN ELECTRONICALLY SIGNED BY RULA FAITH MD    XR Chest 1 View [[id number]] Collected:  02/20/19 1713     Updated:  02/20/19 1734    Narrative:       EXAMINATION: XR CHEST 1 VW - 2/20/2019     INDICATION: Weakness, dizziness, altered mental status.     TECHNIQUE: Single frontal view of the chest.      COMPARISONS: None.     FINDINGS:  Cardiomegaly. No focal airspace disease or pneumothorax.  Possible trace left effusion and trace edema. Atherosclerosis.       Impression:       Cardiomegaly. Favor trace edema and left effusion.     DICTATED:    2/20/2019  EDITED/ls :   2/20/2019      This report was finalized on 2/20/2019 5:32 PM by Galo Bautista.                Assessment/Plan   Assessment / Plan     Active Hospital Problems    Diagnosis Date Noted   • **CAP (community acquired pneumonia) [J18.9] 02/20/2019   • Acute on chronic congestive heart failure (CMS/HCC) [I50.9] 02/20/2019   • Hypoxia [R09.02] 02/20/2019   • A-fib (CMS/HCC) [I48.91] 02/20/2019   • Pleural effusion, bilateral [J90] 02/20/2019   • Pericardial effusion [I31.3] 02/20/2019   • CAD (coronary artery disease) [I25.10] 02/20/2019   • Essential hypertension [I10] 02/20/2019   • Liver nodule [K76.89] 02/20/2019   • Bacteriuria [R82.71] 02/20/2019       ASSESSMENT and PLAN:    1.  Hypoxia W/ ? RLL INFILTRATE AS WELL AS PULM EDEMA LIKELY FROM PAF;   -oxygen saturation dropped to 88% while asleep   -oxygen to keep O2 sat >90%   -continuous pulse ox    2.  PNA; CLINICALLY STABLE W/ INTERMITTENT HYPOXIA.  CONT ABX   -azithromycin and rocephin   -BC x 2   -sputum culture   -duonebs   -incentive spirometry   -cbc, bmp in the am    3.  A/C CHF; LASIX 40 IV Q12 X 2 MORE DOSES THEN RE-EVALUATE; ECHO AS NONE RECENT   -was given Lasix 20 IV x 1 dose in the ED   -strict I&O's   -daily weight   -lasix 40 mg IV q 12 hours x 2 doses   -echo in the am   -cbc, bmp, flp in the am    4.  Afib on coumadin with a subtherapeutic INR; RATE CONTROLLED   -now NSR    -pharmacy to dose coumadin   -daily pt/inr   -continue amiodarone    5.  Small pericardial effusion   -echo in the am    6.  Trace bilateral pleural effusions   -monitor    7.  Bacteriuria, SYMPTOMATIC; ABX FOR PNA WILL TREAT   -complains of dysuria   -was treated for a UTI 3 weeks ago   -Rocephin   -cbc in the am    8.  CAD    9.  Liver nodule    10.  H/O osteosarcoma 60 years ago      DVT prophylaxis:  Lovenox    CODE STATUS:    There are no questions and answers to display.       Admission Status:  I believe this patient meets INPATIENT status due to the  need for care which can only be reasonably provided in an hospital setting such as possible need for aggressive/expedited ancillary services and/or consultation services, IV medications, close physician monitoring, and/or procedures.  In such, I feel patient’s risk for adverse outcomes and need for care warrant INPATIENT evaluation and predict the patient’s care encounter to likely last beyond 2 midnights.      Electronically signed by JAD Fam, 02/20/19, 10:29 PM.  Electronically signed by Reva Peaec MD, 02/20/19, 11:42 PM.

## 2019-02-21 NOTE — PROGRESS NOTES
"Clinical Nutrition   Reason For Visit: MST score 2+, Unintentional weight loss    Patient Name: Evelyn Rogers  YOB: 1940  MRN: [id number]  Date of Encounter: 02/21/19 12:28 PM  Admission date: 2/20/2019          Nutrition Assessment     Admission Problem List:  • **CAP (community acquired pneumonia) [J18.9]    • Acute on chronic congestive heart failure (CMS/HCC) [I50.9]    • Hypoxia [R09.02]    • A-fib (CMS/HCC) [I48.91]    • Pleural effusion, bilateral [J90]    • Pericardial effusion [I31.3]    • CAD (coronary artery disease) [I25.10]    • Essential hypertension [I10]    • Liver nodule [K76.89]    • Bacteriuria [R82.71]          PMH: She  has a past medical history of CAD (coronary artery disease), CHF (congestive heart failure) (CMS/HCC), and Hypertension.   PSxH: She  has no past surgical history on file.        Reported/Observed/Food/Nutrition Related History     Pt states she dislikes the hospital food, \"it has no taste\".  Offered pt Mrs. Del Valle-she declined. Pt states she lost weight over the last year despite eating \"a lot of junk food\" States she just eats what she wants. Pt does not have her dentures in and states she has had ongoing issues with them fitting properly.  She avoids crusty and chewy food items. Offered a supplement, pt declined.       Anthropometrics   Height: 66\"  Weight: 198 lbs 3.2oz --standing scale 2/21  BMI: 31.99  BMI classification: Obese Class I: 30-34.9kg/m2   UBW: 211 lbs-per pt  IBW: 130lbs      Weight change: Pt reports weight loss of ~13 lbs over the last 12 months (6%) Pt states she lost weight in 2016 after a surgery, states at that time she weighed 240 lbs.      Labs reviewed   Labs reviewed: Yes  Results from last 7 days   Lab Units 02/21/19  0644 02/21/19  0027 02/20/19  1653   SODIUM mmol/L 137 135 137   POTASSIUM mmol/L 3.3* 3.4* 3.8   CHLORIDE mmol/L 104 103 106   CO2 mmol/L 27.0 26.0 26.0   BUN mg/dL 16 14 14   CREATININE mg/dL 0.77 0.81 0.82   GLUCOSE mg/dL " 105* 122* 108*   CALCIUM mg/dL 9.3 9.3 10.0   MAGNESIUM mg/dL 1.8 1.8 1.9     Results from last 7 days   Lab Units 02/20/19  1653   WBC 10*3/mm3 4.89   ALBUMIN g/dL 4.23         Lab Results   Lab Value Date/Time    HGBA1C 4.80 02/21/2019 0645     Medications reviewed   Medications reviewed: Yes  Pertinent:IV Lasix, Rocephin, doxycycline    Intake/Ouptut 24 hrs (7:00AM - 6:59 AM)     Intake & Output (last day)       02/20 0701 - 02/21 0700 02/21 0701 - 02/22 0700    IV Piggyback 50     Total Intake(mL/kg) 50 (0.6)     Net +50                      Current Nutrition Prescription   PO: Diet Regular; Cardiac  NPO Diet      Evaluation of Received Nutrient/Fluid Intake:  Insufficient data       Nutrition Diagnosis     Problem Predicted suboptimal energy intake   Etiology GI upset, dislike of hospital food   Signs/Symptoms Reported dislike of hospital food and decreased intake prior to admission       Intervention   Intervention: Follow treatment progress, Care plan reviewed, Advise alternate selection, Interview for preferences, Menu provided, Encourage intake, Supplement offered/refused, offered pt a modified texture for ease of eating due to ill fitting dentures- pt states she would prefer to choose from the main menu      Goal:   General: Nutrition support treatment  PO: Establish PO, Tolerate PO        Monitoring/Evaluation:       Monitoring/Evaluation: Per protocol, I&O, PO intake, GI status  Will Continue to follow per protocol  Jennifer Murillo RD  Time Spent: 40 min

## 2019-02-21 NOTE — PLAN OF CARE
Problem: Patient Care Overview  Goal: Plan of Care Review  Outcome: Ongoing (interventions implemented as appropriate)   02/21/19 4611   OTHER   Outcome Summary Patient demonstrated unsteady gait. Rolling walker improved dynamic balance. Recommend SNF at discharge

## 2019-02-21 NOTE — PROGRESS NOTES
Discharge Planning Assessment  [facility]     Patient Name: Evelyn Rogers  MRN: [id number]  Today's Date: 2/21/2019    Admit Date: 2/20/2019    Discharge Needs Assessment     Row Name 02/21/19 3357       Living Environment    Lives With  alone    Current Living Arrangements  home/apartment/condo    Primary Care Provided by  self    Provides Primary Care For  no one    Quality of Family Relationships  supportive    Able to Return to Prior Arrangements  yes       Resource/Environmental Concerns    Resource/Environmental Concerns  none    Transportation Concerns  car, none       Transition Planning    Patient/Family Anticipates Transition to  home    Patient/Family Anticipated Services at Transition  none    Transportation Anticipated  family or friend will provide       Discharge Needs Assessment    Readmission Within the Last 30 Days  no previous admission in last 30 days    Concerns to be Addressed  no discharge needs identified    Equipment Currently Used at Home  none    Anticipated Changes Related to Illness  none    Equipment Needed After Discharge  none        Discharge Plan     Row Name 02/21/19 3481       Plan    Plan  Home with home health    Patient/Family in Agreement with Plan  yes    Plan Comments  Spoke with pt at bedside. Pt reports she has been independent in ADL's and IADL's and does not use any medical equipment and hopes not to. Pt reports that she takes care of her cleanng and cooking at home but she did stop driving so her son grocery shops for her.  Pt reports her son helps her with transportation if needed. Pt explained that she wanted to switch to a [employer]ist primary care physician and is agreeable to SW calling the transitional care coordinator to set up one once pt is ready for discharge. Pt reports she currently has PT and OT in the home with HH but cannot remember the name of the home health. Pt reports she had trouble getting to the bedside commode yesterday here at [facility] but is doing  better today and is hopeful to discharge home. PT and OT have been consulted. SW will continue to follow for discharge needs as arise.         Destination      No service coordination in this encounter.      Durable Medical Equipment      No service coordination in this encounter.      Dialysis/Infusion      No service coordination in this encounter.      Home Medical Care      No service coordination in this encounter.      Community Resources      No service coordination in this encounter.          Demographic Summary     Row Name 02/21/19 4185       General Information    Reason for Consult  discharge planning        Functional Status     Row Name 02/21/19 5976       Functional Status, IADL    Medications  independent    Meal Preparation  independent    Housekeeping  independent    Laundry  independent    Shopping  assistive person        Psychosocial    No documentation.       Abuse/Neglect    No documentation.       Legal    No documentation.       Substance Abuse    No documentation.       Patient Forms    No documentation.           BECKY Maldonado

## 2019-02-21 NOTE — CONSULTS
[facility] AT [address], [address]  [place], [place], [address] [phone]  [email or web address] [facility]           INPATIENT CONSULTATION NOTE    Referring Physician:  Dr. Matias    Patient Care Team:  Patient Care Team:  Provider, No Known as PCP - General    Reason for consultation:   Chief Complaint   Patient presents with   • Weakness - Generalized            HPI:    Evelyn Rogers is a 78 y.o. female.  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 She presented to [facility] ED with complaints of fatigue, cough, and shortness of breath. She reports intermittent chest pressure for the past 2 months that typically occurs at rest and resolves spontaneously. She has had nitroglycerin in the past, but has not used it recently. She has had dyspnea with exertion for the past 1.5 years.  She reports occasional palpitations. She has chronic lower extremity edema that has remained unchanged since having the osteosarcoma removed. She reports a syncopal episode 1 month ago, but this has not recurred.  She denies tobacco, alcohol, or drug use.  She reports two children passing from [facility] at age 48 and 53 respectively.  She follows with Dr. Powell at [facility] in the past, but is planning to transfer care to the [facility].    PFSH:  Patient Active Problem List   Diagnosis   • CAP (community acquired pneumonia)   • Acute on chronic congestive heart failure (CMS/HCC)   • Hypoxia   • A-fib (CMS/HCC)   • Pleural effusion, bilateral   • Pericardial effusion   • CAD (coronary artery disease)   • Essential hypertension   • Liver nodule   • Bacteriuria       No current facility-administered medications on file prior to encounter.      Current Outpatient Medications on File Prior to Encounter   Medication Sig Dispense Refill   • ALPRAZolam (XANAX) 0.5  MG tablet Take 0.5 mg by mouth Daily As Needed for Anxiety.     • amiodarone (PACERONE) 200 MG tablet Take 200 mg by mouth Daily.     • baclofen (LIORESAL) 10 MG tablet Take 10 mg by mouth 3 (Three) Times a Day.     • diphenhydrAMINE-acetaminophen (TYLENOL PM)  MG tablet per tablet Take 1 tablet by mouth Every Night.     • furosemide (LASIX) 40 MG tablet Take 40 mg by mouth 3 (Three) Times a Week. Monday Wednesday Friday     • losartan (COZAAR) 50 MG tablet Take 50 mg by mouth Daily.     • warfarin (COUMADIN) 5 MG tablet Take 5 mg by mouth Daily.     • nystatin (MYCOSTATIN) [id number] UNIT/GM cream Apply  topically to the appropriate area as directed 2 (Two) Times a Day.       Allergies   Allergen Reactions   • Other Unknown (See Comments)     SOMETHING THAT SHE GOT DURING AN MRI       Social History     Socioeconomic History   • Marital status:      Spouse name: Not on file   • Number of children: Not on file   • Years of education: Not on file   • Highest education level: Not on file   Tobacco Use   • Smoking status: Never Smoker   • Smokeless tobacco: Never Used   Substance and Sexual Activity   • Alcohol use: No     Frequency: Never   • Drug use: No   • Sexual activity: Defer     No family history on file.    Review of Systems:  Positive for chest pressure, dyspnea with exertion, palpitations, fatigue, and cough.  All other systems reviewed are negative.         Objective:     Vital Sign Min/Max for last 24 hours  Temp  Min: 98.3 °F (36.8 °C)  Max: 100.4 °F (38 °C)   BP  Min: 108/61  Max: 166/72   Pulse  Min: 55  Max: 77   Resp  Min: 16  Max: 20   SpO2  Min: 87 %  Max: 99 %   No Data Recorded      Intake/Output Summary (Last 24 hours) at 2/21/2019 1104  Last data filed at 2/20/2019 2104  Gross per 24 hour   Intake 50 ml   Output --   Net 50 ml           Vitals:    02/21/19 0833   BP: 108/61   Pulse: 55   Resp: 16   Temp: 98.3 °F (36.8 °C)   SpO2: 96%       CONSTITUTIONAL: Well-nourished. In no acute  distress.   SKIN: Warm and dry. No rashes noted  HEENT: Head is normocephalic and atraumatic. Mucous membranes are pink and moist.   NECK: Supple without masses or thyromegaly.   LUNGS: Normal effort. Rales.  CARDIOVASCULAR: The heart has a regular rate with a normal S1 and S2. There is no murmur, gallop, rub, or click appreciated.   PERIPHERAL VASCULAR: C Radial pulses are 2+ bilaterally. Posterior tibial pulses are 2+ and symmetrical. There is mild L>R lower extremity edema.   ABDOMEN: Soft with no tenderness with palpitation. No hepatosplenomegaly  MUSCULOSKELETAL:  No digital cyanosis  NEUROLOGICAL: Nonfocal.  PSYCHIATRIC: Alert, orientated x 3, appropriate affect     Labs:  Lab Results   Component Value Date    TROPONINI 0.100 (H) 02/21/2019       Glucose   Date Value Ref Range Status   02/21/2019 105 (H) 70 - 100 mg/dL Final     BUN   Date Value Ref Range Status   02/21/2019 16 9 - 23 mg/dL Final     Creatinine   Date Value Ref Range Status   02/21/2019 0.77 0.60 - 1.30 mg/dL Final     Sodium   Date Value Ref Range Status   02/21/2019 137 132 - 146 mmol/L Final     Potassium   Date Value Ref Range Status   02/21/2019 3.3 (L) 3.5 - 5.5 mmol/L Final     Chloride   Date Value Ref Range Status   02/21/2019 104 99 - 109 mmol/L Final     CO2   Date Value Ref Range Status   02/21/2019 27.0 20.0 - 31.0 mmol/L Final     Calcium   Date Value Ref Range Status   02/21/2019 9.3 8.7 - 10.4 mg/dL Final     Total Protein   Date Value Ref Range Status   02/20/2019 6.8 5.7 - 8.2 g/dL Final     Albumin   Date Value Ref Range Status   02/20/2019 4.23 3.20 - 4.80 g/dL Final     ALT (SGPT)   Date Value Ref Range Status   02/20/2019 19 7 - 40 U/L Final     AST (SGOT)   Date Value Ref Range Status   02/20/2019 18 0 - 33 U/L Final     Alkaline Phosphatase   Date Value Ref Range Status   02/20/2019 89 25 - 100 U/L Final     Total Bilirubin   Date Value Ref Range Status   02/20/2019 2.0 (H) 0.3 - 1.2 mg/dL Final     eGFR Non African  Amer   Date Value Ref Range Status   02/21/2019 73 >60 mL/min/1.73 Final     BUN/Creatinine Ratio   Date Value Ref Range Status   02/21/2019 20.8 7.0 - 25.0 Final     Anion Gap   Date Value Ref Range Status   02/21/2019 6.0 3.0 - 11.0 mmol/L Final       No results found for: CHOL  No results found for: TRIG  No results found for: HDL  No results found for: LDL  No components found for: LDLDIRECTC      WBC   Date Value Ref Range Status   02/20/2019 4.89 3.50 - 10.80 10*3/mm3 Final     RBC   Date Value Ref Range Status   02/20/2019 4.05 3.89 - 5.14 10*6/mm3 Final     Hemoglobin   Date Value Ref Range Status   02/20/2019 13.4 11.5 - 15.5 g/dL Final     Hematocrit   Date Value Ref Range Status   02/20/2019 41.0 34.5 - 44.0 % Final     MCV   Date Value Ref Range Status   02/20/2019 101.2 (H) 80.0 - 99.0 fL Final     MCH   Date Value Ref Range Status   02/20/2019 33.1 (H) 27.0 - 31.0 pg Final     MCHC   Date Value Ref Range Status   02/20/2019 32.7 32.0 - 36.0 g/dL Final     RDW   Date Value Ref Range Status   02/20/2019 15.1 (H) 11.3 - 14.5 % Final     RDW-SD   Date Value Ref Range Status   02/20/2019 56.4 (H) 37.0 - 54.0 fl Final     MPV   Date Value Ref Range Status   02/20/2019 10.7 6.0 - 12.0 fL Final     Platelets   Date Value Ref Range Status   02/20/2019 121 (L) 150 - 450 10*3/mm3 Final     Neutrophil %   Date Value Ref Range Status   02/20/2019 82.1 (H) 41.0 - 71.0 % Final     Lymphocyte %   Date Value Ref Range Status   02/20/2019 10.8 (L) 24.0 - 44.0 % Final     Monocyte %   Date Value Ref Range Status   02/20/2019 6.1 0.0 - 12.0 % Final     Eosinophil %   Date Value Ref Range Status   02/20/2019 0.2 0.0 - 3.0 % Final     Basophil %   Date Value Ref Range Status   02/20/2019 0.8 0.0 - 1.0 % Final     Immature Grans %   Date Value Ref Range Status   02/20/2019 0.2 0.0 - 0.6 % Final     Neutrophils, Absolute   Date Value Ref Range Status   02/20/2019 4.01 1.50 - 8.30 10*3/mm3 Final     Lymphocytes, Absolute    Date Value Ref Range Status   02/20/2019 0.53 (L) 0.60 - 4.80 10*3/mm3 Final     Monocytes, Absolute   Date Value Ref Range Status   02/20/2019 0.30 0.00 - 1.00 10*3/mm3 Final     Eosinophils, Absolute   Date Value Ref Range Status   02/20/2019 0.01 0.00 - 0.30 10*3/mm3 Final     Basophils, Absolute   Date Value Ref Range Status   02/20/2019 0.04 0.00 - 0.20 10*3/mm3 Final     Immature Grans, Absolute   Date Value Ref Range Status   02/20/2019 0.01 0.00 - 0.05 10*3/mm3 Final         Diagnostic Data:    EKG:  Sinus bradycardia with marked sinus arrhythmia  Rightward axis  Low voltage QRS  Nonspecific ST abnormality    Tele:  Sinus Bradycardia    CT Chest 2/20/2019  1. Mild cardiomegaly. CAD. Small pericardial effusion.   2. Trace bilateral pleural effusions.   3. Left hepatic 6.5 cm nodule, probably cyst.   4. Mild patchy RLL infiltrate. Clinically correlates to rule out pneumonia.   5. Minimal bilateral scattered subsegmental atelectasis.   6. Additional findings, as above.        Assessment and Plan:   ASSESSMENT:  -Acute on chronic heart failure, , trace bilateral pleural effusions.  -Paroxysmal atrial fibrillation, maintaining NSR on amiodarone, anticoagulated with coumadin  -RLL Pneumonia, management per hospitalist  -Hypertension, at goal on current medications  -Hyperlipidemia, not on statin therapy  -Elevated troponin in the setting of pneumonia.    PLAN:  -Echocardiogram as previously ordered.  -NPO after midnight for nuclear PET stress test tomorrow.  -Hold coumadin this evening. For possible cardiac catheterization if stress testing abnormal.   -Continue rhythm control with amiodarone and stroke prophylaxis with coumadin.    Scribed for Dr. Lee by JAD Campa. 2/21/2019  11:40 AM    Electronically signed by JAD Campa, 02/21/19, 11:40 AM.    Gerry ANTUNEZ M.D., personally performed the services described in this documentation as scribed by the above named individual in my  presence, and it is both accurate and complete.  2/21/2019  12:12 PM

## 2019-02-21 NOTE — PROGRESS NOTES
"Pharmacy Consult  -  Warfarin  Evelyn Rogers is a  78 y.o. female   Height - 167.6 cm (66\")  Weight - 84.2 kg (185 lb 11.2 oz)    Consulting Provider Reva Peace MD (hospitalist service)  Indication - Atrial Fibrillation  Goal INR - 2-3  Home Regimen:   - warfarin 5 mg daily     Bridge Therapy: No     Drug-Drug Interactions with current regimen:   Doxycycline - increased bleed risk              Amiodarone - increased bleed risk    Warfarin Dosing During Admission:  Date  2/20           INR  1.81           Dose  5 mg  (pt. Took at home prior to coming to the hospital)              Education Provided:    Discharge Follow up:  Following Provider -  Follow up time range or appointment -    Labs:  Results from last 7 days   Lab Units 02/20/19  1753 02/20/19  1653   INR  1.81*  --    HEMOGLOBIN g/dL  --  13.4   HEMATOCRIT %  --  41.0   PLATELETS 10*3/mm3  --  121*     Results from last 7 days   Lab Units 02/20/19  1653   SODIUM mmol/L 137   POTASSIUM mmol/L 3.8   CHLORIDE mmol/L 106   CO2 mmol/L 26.0   BUN mg/dL 14   CREATININE mg/dL 0.82   CALCIUM mg/dL 10.0   BILIRUBIN mg/dL 2.0*   ALK PHOS U/L 89   ALT (SGPT) U/L 19   AST (SGOT) U/L 18   GLUCOSE mg/dL 108*     Current dietary intake:   Diet Order   Procedures   • NPO Diet       Assessment/Plan:   1. Ms. Rogers states she already took her warfarin dose at home on 2/20.  Her home dose of warfarin 5 mg daily is scheduled to start inpatient on 2/21.  2. Daily PT-INR labs have been ordered.  3. Pharmacy will adjust Ms. Rogers's warfarin dose as needed based on daily INR result.    Thank you  Toro Escobedo, PharmD, BCPS  2/20/2019  11:41 PM         "

## 2019-02-21 NOTE — THERAPY EVALUATION
Acute Care - Physical Therapy Initial Evaluation  [facility]     Patient Name: Evelyn Rogers  : 1940  MRN: [id number]  Today's Date: 2019   Onset of Illness/Injury or Date of Surgery: 19  Date of Referral to PT: 19  Referring Physician: Royal SOLANO      Admit Date: 2019    Visit Dx:     ICD-10-CM ICD-9-CM   1. Acute on chronic congestive heart failure, unspecified heart failure type (CMS/HCC) I50.9 428.0   2. Pneumonia of right lower lobe due to infectious organism (CMS/HCC) J18.1 486   3. Hypoxia R09.02 799.02   4. Impaired functional mobility, balance, gait, and endurance Z74.09 V49.89     Patient Active Problem List   Diagnosis   • CAP (community acquired pneumonia)   • Acute on chronic congestive heart failure (CMS/HCC)   • Hypoxia   • A-fib (CMS/HCC)   • Pleural effusion, bilateral   • Pericardial effusion   • CAD (coronary artery disease)   • Essential hypertension   • Liver nodule   • Bacteriuria     Past Medical History:   Diagnosis Date   • CAD (coronary artery disease)    • CHF (congestive heart failure) (CMS/HCC)    • Hypertension      No past surgical history on file.     PT ASSESSMENT (last 12 hours)      Physical Therapy Evaluation     Row Name 19 1425          PT Evaluation Time/Intention    Subjective Information  complains of;weakness  -SC     Document Type  evaluation  -SC     Mode of Treatment  physical therapy  -SC     Patient Effort  good  -SC     Symptoms Noted During/After Treatment  none  -SC     Row Name 19 1425          General Information    Patient Profile Reviewed?  yes  -SC     Onset of Illness/Injury or Date of Surgery  19  -SC     Referring Physician  Royal SOLANO  -SC     Patient Observations  alert;cooperative;agree to therapy  -SC     General Observations of Patient  in bed  -SC     Prior Level of Function  independent:;all household mobility touches walls when walking through  -SC     Pertinent History of Current Functional Problem   Admitted  with c/o weakness, R side pain, nausea, cough Dx with CHF B pulmonary effusion, R LL PNA.   -SC     Existing Precautions/Restrictions  oxygen therapy device and L/min;fall  -SC     Risks Reviewed  patient:;nausea/vomiting;dizziness;increased discomfort  -SC     Benefits Reviewed  patient:;improve function;increase independence;increase strength  -SC     Barriers to Rehab  none identified  -SC     Row Name 02/21/19 1425          Relationship/Environment    Lives With  alone  -SC     Row Name 02/21/19 1425          Resource/Environmental Concerns    Current Living Arrangements  home/apartment/condo  -SC     Row Name 02/21/19 1425          Cognitive Assessment/Intervention- PT/OT    Orientation Status (Cognition)  oriented x 3  -SC     Follows Commands (Cognition)  follows one step commands;over 90% accuracy  -SC     Safety Deficit (Cognitive)  insight into deficits/self awareness;judgment  -SC     Row Name 02/21/19 1425          Safety Issues, Functional Mobility    Safety Issues Affecting Function (Mobility)  judgment;insight into deficits/self awareness  -SC     Impairments Affecting Function (Mobility)  balance  -SC     Row Name 02/21/19 1425          Bed Mobility Assessment/Treatment    Bed Mobility Assessment/Treatment  scooting/bridging;supine-sit;sit-supine  -SC     Scooting/Bridging Tarrs (Bed Mobility)  independent  -SC     Supine-Sit Tarrs (Bed Mobility)  conditional independence  -SC     Sit-Supine Tarrs (Bed Mobility)  conditional independence  -SC     Assistive Device (Bed Mobility)  bed rails  -SC     Row Name 02/21/19 1425          Transfer Assessment/Treatment    Transfer Assessment/Treatment  sit-stand transfer;stand-sit transfer  -SC     Comment (Transfers)  cues for pushing off bed. Demonstrated safe standing  -SC     Sit-Stand Tarrs (Transfers)  verbal cues;supervision  -SC     Stand-Sit Tarrs (Transfers)  verbal cues;supervision  -SC     Row Name  02/21/19 1425          Sit-Stand Transfer    Assistive Device (Sit-Stand Transfers)  walker, front-wheeled  -SC     Row Name 02/21/19 1425          Stand-Sit Transfer    Assistive Device (Stand-Sit Transfers)  walker, front-wheeled  -SC     Row Name 02/21/19 1425          Gait/Stairs Assessment/Training    Gait/Stairs Assessment/Training  gait/ambulation independence  -SC     [place] Level (Gait)  verbal cues;contact guard  -SC     Assistive Device (Gait)  walker, front-wheeled  -SC     Distance in Feet (Gait)  30  -SC     Pattern (Gait)  step-through  -SC     Deviations/Abnormal Patterns (Gait)  base of support, wide;stride length decreased  -SC     Comment (Gait/Stairs)  Demonstrated unsteady gait . Cues to stay close to walker  -SC     Row Name 02/21/19 1425          General ROM    GENERAL ROM COMMENTS  WFL  -SC     Row Name 02/21/19 1425          MMT (Manual Muscle Testing)    General MMT Comments  legs grossly 4+/5  -SC     Row Name 02/21/19 1425          Motor Assessment/Intervention    Additional Documentation  Balance (Group);Balance Interventions (Group);Therapeutic Exercise (Group);Therapeutic Exercise Interventions (Group)  -SC     Row Name 02/21/19 1425          Therapeutic Exercise    Lower Extremity (Therapeutic Exercise)  LAQ (long arc quad), bilateral  -SC     Exercise Type (Therapeutic Exercise)  AROM (active range of motion)  -SC     Position (Therapeutic Exercise)  prone  -SC     Sets/Reps (Therapeutic Exercise)  10  -SC     Comment (Therapeutic Exercise)  spirometer x10--much coughing  -SC     Row Name 02/21/19 1425          Balance    Balance  dynamic standing balance  -SC     Row Name 02/21/19 1425          Dynamic Standing Balance    Level of [place], Reaches Outside Midline (Standing, Dynamic Balance)  contact guard assist  -SC     Assistive Device Utilized (Supported Standing, Dynamic Balance)  walker, rolling  -SC     Row Name 02/21/19 1425          Sensory Assessment/Intervention     Sensory General Assessment  light touch sensation deficits identified hx of neuropathy  -[facility] Name 02/21/19 1425          Pain Assessment    Additional Documentation  Pain Scale: Numbers Pre/Post-Treatment (Group)  -[facility] Name 02/21/19 1425          Pain Scale: Numbers Pre/Post-Treatment    Pain Scale: Numbers, Pretreatment  0/10 - no pain  -SC     Pain Scale: Numbers, Post-Treatment  0/10 - no pain  -[facility] Name 02/21/19 1425          Coping    Observed Emotional State  accepting;calm;cooperative  -SC     Verbalized Emotional State  acceptance  -[facility] Name 02/21/19 1425          Physical Therapy Clinical Impression    Date of Referral to PT  02/20/19  -SC     PT Diagnosis (PT Clinical Impression)  impaired mobility  -SC     Patient/Family Goals Statement (PT Clinical Impression)  go home  -SC     Criteria for Skilled Interventions Met (PT Clinical Impression)  yes;treatment indicated  -SC     Pathology/Pathophysiology Noted (Describe Specifically for Each System)  cardiovascular;musculoskeletal  -SC     Impairments Found (describe specific impairments)  aerobic capacity/endurance;gait, locomotion, and balance  -SC     Rehab Potential (PT Clinical Summary)  good, to achieve stated therapy goals  -SC     Care Plan Review (PT)  risks/benefits reviewed;care plan/treatment goals reviewed;evaluation/treatment results reviewed  -SC     Patient/Family Concerns, Equipment Needs at Discharge (PT)  rolling walker  -[facility] Name 02/21/19 1425          Vital Signs    Pre SpO2 (%)  98  -SC     O2 Delivery Pre Treatment  supplemental O2  -SC     Post SpO2 (%)  95  -SC     O2 Delivery Post Treatment  supplemental O2  -[facility] Name 02/21/19 1425          Physical Therapy Goals    Gait Training Goal Selection (PT)  gait training, PT goal 1  -SC     Row Name 02/21/19 1425          Gait Training Goal 1 (PT)    Activity/Assistive Device (Gait Training Goal 1, PT)  gait (walking locomotion);walker, rolling   -SC     Lares Level (Gait Training Goal 1, PT)  supervision required  -SC     Distance (Gait Goal 1, PT)  250  -SC     Time Frame (Gait Training Goal 1, PT)  long term goal (LTG);10 days  -SC     Row Name 02/21/19 1425          Patient Education Goal (PT)    Activity (Patient Education Goal, PT)  safety with mobility  -SC     Lares/Cues/Accuracy (Memory Goal 2, PT)  demonstrates adequately  -SC     Time Frame (Patient Education Goal, PT)  long term goal (LTG);10 days  -SC     Row Name 02/21/19 1429          Positioning and Restraints    Pre-Treatment Position  in bed  -SC     Post Treatment Position  chair  -SC     In Chair  sitting;reclined;notified nsg;call light within reach;exit alarm on  -SC       User Key  (r) = Recorded By, (t) = Taken By, (c) = Cosigned By    Initials Name Provider Type    SC Neema Oliva PT Physical Therapist        Physical Therapy Education     Title: PT OT SLP Therapies (Done)     Topic: Physical Therapy (Done)     Point: Mobility training (Done)     Learning Progress Summary           Patient MarshalSAGAR VU, DU,NR by SC at 2/21/2019  2:25 PM    Comment:  reviewed safety with mobility                   Point: Home exercise program (Done)     Learning Progress Summary           Patient SAGAR Araya VU,FELECIA,NR by SC at 2/21/2019  2:25 PM    Comment:  reviewed safety with mobility                   Point: Body mechanics (Done)     Learning Progress Summary           Patient SAGAR Araya VU,FELECIA,NR by SC at 2/21/2019  2:25 PM    Comment:  reviewed safety with mobility                   Point: Precautions (Done)     Learning Progress Summary           Patient SAGAR Araya VU, DU,NR by SC at 2/21/2019  2:25 PM    Comment:  reviewed safety with mobility                               User Key     Initials Effective Dates Name Provider Type [facility] 06/19/15 -  Neema Oliva PT Physical Therapist PT              PT Recommendation and Plan  Anticipated Discharge Disposition (PT):  skilled nursing facility  Planned Therapy Interventions (PT Eval): bed mobility training, gait training, home exercise program, strengthening, stretching, transfer training  Therapy Frequency (PT Clinical Impression): daily  Outcome Summary/Treatment Plan (PT)  Patient/Family Concerns, Equipment Needs at Discharge (PT): rolling walker  Anticipated Discharge Disposition (PT): skilled nursing facility  Outcome Summary: Patient demonstrated unsteady gait. Rolling walker improved dynamic balance. Recommend SNF at discharge  Outcome Measures     Row Name 02/21/19 1425             How much help from another person do you currently need...    Turning from your back to your side while in flat bed without using bedrails?  4  -SC      Moving from lying on back to sitting on the side of a flat bed without bedrails?  3  -SC      Moving to and from a bed to a chair (including a wheelchair)?  3  -SC      Standing up from a chair using your arms (e.g., wheelchair, bedside chair)?  3  -SC      Climbing 3-5 steps with a railing?  2  -SC      To walk in hospital room?  3  -SC      AM-PAC 6 Clicks Score  18  -SC         Functional Assessment    Outcome Measure Options  AM-PAC 6 Clicks Basic Mobility (PT)  -SC        User Key  (r) = Recorded By, (t) = Taken By, (c) = Cosigned By    Initials Name Provider Type    SC Neema Oliva PT Physical Therapist         Time Calculation:   PT Charges     Row Name 02/21/19 1425             Time Calculation    Start Time  1425  -SC      PT Received On  02/21/19  -SC      PT Goal Re-Cert Due Date  03/03/19  -SC        User Key  (r) = Recorded By, (t) = Taken By, (c) = Cosigned By    Initials Name Provider Type    SC Neema Oliva PT Physical Therapist        Therapy Suggested Charges     Code   Minutes Charges    None           Therapy Charges for Today     Code Description Service Date Service Provider Modifiers Qty    90107959845  PT EVAL MOD COMPLEXITY 4 2/21/2019 Neema Oliva, PT GP 1     [id number]  PT THER SUPP EA 15 MIN 2/21/2019 Neema Oliva, PT GP 2          PT G-Codes  Outcome Measure Options: AM-PAC 6 Clicks Basic Mobility (PT)  AM-PAC 6 Clicks Score: 18      Neema Oliva, PT  2/21/2019

## 2019-02-21 NOTE — PLAN OF CARE
Problem: Fall Risk (Adult)  Goal: Absence of Fall  Outcome: Ongoing (interventions implemented as appropriate)   02/21/19 0759   Fall Risk (Adult)   Absence of Fall making progress toward outcome

## 2019-02-21 NOTE — PROGRESS NOTES
[facility] Medicine Services  PROGRESS NOTE    Patient Name: Evelyn Rogers  : 1940  MRN: [id number]    Date of Admission: 2019  Length of Stay: 1  Primary Care Physician: Provider, No Known    Subjective   Subjective     CC:  Cough, dyspnea    HPI:  No current chest pain. Does describe occasional chest pressure but no consistent precipitating factors. Does get worse exertional dyspnea over past months.     Review of Systems  No headache, no chest pain    Otherwise ROS is negative except as mentioned in the HPI.    Objective   Objective     Vital Signs:   Temp:  [98.9 °F (37.2 °C)-100.4 °F (38 °C)] 98.9 °F (37.2 °C)  Heart Rate:  [55-77] 55  Resp:  [18-20] 18  BP: (108-166)/() 108/61        Physical Exam:  Ill but nontoxic appearing, alert, oriented x 3  Appropriate affect  Ncat, oroph clear, neck supple  rrr  Right > left basilar insp crackles, no overt wheezes  abd soft, nontender  Some L > R LE edema (chronic)  Face symmetric, speech clear    Results Reviewed:  I have personally reviewed current lab, radiology, and data and agree.    Results from last 7 days   Lab Units 19  0644 19  0031 19  1753 19  1653   WBC 10*3/mm3  --   --   --  4.89   HEMOGLOBIN g/dL  --   --   --  13.4   HEMATOCRIT %  --   --   --  41.0   PLATELETS 10*3/mm3  --   --   --  121*   INR  1.71* 1.67* 1.81*  --      Results from last 7 days   Lab Units 19  0644 19  0027 19  1654 19  1653   SODIUM mmol/L 137 135  --  137   POTASSIUM mmol/L 3.3* 3.4*  --  3.8   CHLORIDE mmol/L 104 103  --  106   CO2 mmol/L 27.0 26.0  --  26.0   BUN mg/dL 16 14  --  14   CREATININE mg/dL 0.77 0.81  --  0.82   GLUCOSE mg/dL 105* 122*  --  108*   CALCIUM mg/dL 9.3 9.3  --  10.0   ALT (SGPT) U/L  --   --   --  19   AST (SGOT) U/L  --   --   --  18   TROPONIN I ng/mL 0.100* 0.084* 0.01  --      Estimated Creatinine Clearance: 64.4 mL/min (by C-G formula based on SCr of 0.77  mg/dL).    BNP   Date Value Ref Range Status   02/20/2019 546.0 (H) 0.0 - 100.0 pg/mL Final     Comment:     Results may be falsely decreased if patient taking Biotin.       Microbiology Results Abnormal     Procedure Component Value - Date/Time    Blood Culture - Blood, Arm, Right [[id number]] Collected:  02/20/19 1800    Lab Status:  Preliminary result Specimen:  Blood from Arm, Right Updated:  02/21/19 0630     Blood Culture No growth at less than 24 hours    Blood Culture - Blood, Arm, Left [[id number]] Collected:  02/20/19 1805    Lab Status:  Preliminary result Specimen:  Blood from Arm, Left Updated:  02/21/19 0630     Blood Culture No growth at less than 24 hours    Influenza Antigen, Rapid - Swab, Nasopharynx [[id number]]  (Normal) Collected:  02/20/19 1747    Lab Status:  Final result Specimen:  Swab from Nasopharynx Updated:  02/20/19 1842     Influenza A Ag, EIA Negative     Influenza B Ag, EIA Negative          Imaging Results (last 24 hours)     Procedure Component Value Units Date/Time    CT Chest Without Contrast [[id number]] Collected:  02/20/19 1945     Updated:  02/20/19 2039    Narrative:       EXAM:    CT Chest Without Contrast     EXAM DATE/TIME:    2/20/2019 7:45 PM     CLINICAL HISTORY:    78 years old, female; Signs and symptoms; Cough and shortness of breath;   Additional info: Cough, SOB, hypoxia, concern for pneumonia     TECHNIQUE:    Axial computed tomography images of the chest without intravenous contrast.    All CT scans at this facility use at least one of these dose optimization   techniques: automated exposure control; mA and/or kV adjustment per patient   size (includes targeted exams where dose is matched to clinical indication); or   iterative reconstruction.    Coronal reformatted images were created and reviewed.     COMPARISON:    CR XR CHEST 1 VW 2/20/2019 4:21 PM     FINDINGS:    Limitations:  Motion artifact does somewhat limit the sensitivity of this   examination.    Lungs:   Mild patchy RLL infiltrate. Clinically correlates to rule out   pneumonia. Minimal scattered subsegmental atelectasis in bilateral lungs in   multilobar distribution pattern.    Pleural space:  Trace bilateral pleural effusions.  No pneumothorax.   Heart:  There are coronary artery atheromatous calcifications, consistent with   CAD. Mild cardiomegaly. Small pericardial effusion. Some of the pericardial   effusion is within pericardial recesses.    Mediastinum:  Air in distal esophagus versus minimal hiatal hernia.    Aorta:  Aortic atherosclerosis. No aortic aneurysm.    Lymph nodes:  No gross lymphadenopathy on noncontrast exam.    Bones/joints:  Mild scoliosis. Degenerative changes of the spine.    Soft tissues: Unremarkable.    Liver:  Round hypodense 6.5 cm left hepatic nodule, probably a cyst. US could   characterize.    Gallbladder and bile ducts:  Cholecystectomy.    Spleen:  Unremarkable. No splenomegaly or mass.    Adrenals:  Unremarkable adrenals.    Kidneys and ureters:  Visualized upper kidneys are unremarkable.       Impression:       1. Mild cardiomegaly. CAD. Small pericardial effusion.   2. Trace bilateral pleural effusions.   3. Left hepatic 6.5 cm nodule, probably cyst.   4. Mild patchy RLL infiltrate. Clinically correlates to rule out pneumonia.   5. Minimal bilateral scattered subsegmental atelectasis.   6. Additional findings, as above.     THIS DOCUMENT HAS BEEN ELECTRONICALLY SIGNED BY RULA FAITH MD    XR Chest 1 View [[id number]] Collected:  02/20/19 1713     Updated:  02/20/19 1734    Narrative:       EXAMINATION: XR CHEST 1 VW - 2/20/2019     INDICATION: Weakness, dizziness, altered mental status.     TECHNIQUE: Single frontal view of the chest.      COMPARISONS: None.     FINDINGS:  Cardiomegaly. No focal airspace disease or pneumothorax.  Possible trace left effusion and trace edema. Atherosclerosis.       Impression:       Cardiomegaly. Favor trace edema and left effusion.      DICTATED:   2/20/2019  EDITED/ls :   2/20/2019      This report was finalized on 2/20/2019 5:32 PM by Galo Bautista.                  I have reviewed the medications:    Current Facility-Administered Medications:   •  acetaminophen (TYLENOL) tablet 650 mg, 650 mg, Oral, Q6H PRN, Jose Gutierrez PA-C, 650 mg at 02/21/19 0251  •  ALPRAZolam (XANAX) tablet 0.5 mg, 0.5 mg, Oral, Daily PRN, Reva Peace MD  •  amiodarone (PACERONE) tablet 200 mg, 200 mg, Oral, Daily, Day, Reva RANDALL MD, Stopped at 02/21/19 0838  •  baclofen (LIORESAL) tablet 10 mg, 10 mg, Oral, TID, Reva Peace MD, 10 mg at 02/21/19 0833  •  cefTRIAXone (ROCEPHIN) IVPB 1 g, 1 g, Intravenous, Q24H **AND** doxycycline (VIBRAMYCIN) 100 mg/100 mL 0.9% NS MBP, 100 mg, Intravenous, Q12H, Reva Peace MD, 100 mg at 02/21/19 0545  •  furosemide (LASIX) injection 40 mg, 40 mg, Intravenous, Once, Justus Matias MD  •  [START ON 2/22/2019] furosemide (LASIX) tablet 40 mg, 40 mg, Oral, Once per day on Mon Wed Fri, Justus Matias MD  •  losartan (COZAAR) tablet 50 mg, 50 mg, Oral, Daily, Day, Reva RANDALL MD, 50 mg at 02/21/19 0833  •  magnesium sulfate 4 gram infusion - Mg less than or equal to 1mg/dL, 4 g, Intravenous, PRN **OR** magnesium sulfate 3 gram infusion (1gm x 3) - Mg 1.1 - 1.5 mg/dL, 1 g, Intravenous, PRN **OR** Magnesium Sulfate 2 gram infusion- Mg 1.6 - 1.9 mg/dL, 2 g, Intravenous, PRN, Justus Matias MD  •  Pharmacy to dose warfarin, , Does not apply, Continuous PRN, Reva Peace MD  •  potassium chloride (MICRO-K) CR capsule 40 mEq, 40 mEq, Oral, PRN **OR** potassium chloride (KLOR-CON) packet 40 mEq, 40 mEq, Oral, PRN **OR** potassium chloride 10 mEq in 100 mL IVPB, 10 mEq, Intravenous, Q1H PRN, Justus Matias MD  •  sodium chloride 0.9 % flush 10 mL, 10 mL, Intravenous, PRN, Jayce Lopez MD  •  sodium chloride 0.9 % flush 3 mL, 3 mL, Intravenous, Q12H, Reva Peace MD  •  sodium chloride 0.9 % flush 3-10 mL,  3-10 mL, Intravenous, PRN, DayReva MD  •  warfarin (COUMADIN) tablet 5 mg, 5 mg, Oral, Daily, Reva Peace MD      Assessment/Plan   Assessment / Plan     Active Hospital Problems    Diagnosis Date Noted   • **CAP (community acquired pneumonia) [J18.9] 02/20/2019   • Acute on chronic congestive heart failure (CMS/HCC) [I50.9] 02/20/2019   • Hypoxia [R09.02] 02/20/2019   • A-fib (CMS/HCC) [I48.91] 02/20/2019   • Pleural effusion, bilateral [J90] 02/20/2019   • Pericardial effusion [I31.3] 02/20/2019   • CAD (coronary artery disease) [I25.10] 02/20/2019   • Essential hypertension [I10] 02/20/2019   • Liver nodule [K76.89] 02/20/2019   • Bacteriuria [R82.71] 02/20/2019          Brief Hospital Course to date:  Evelyn Rogers is a 78 y.o. female     *RLL pneumonia  *A/C CHF (unknown ef, data deficit)  *Acute Hypoxic resp failure (due to above)  *minimally elevated troponin (likely due to hypoxia, systemic illness)  *Hx afib (in sinus on amiodarone)  *subtherapeutic inr  *bacteria in urine  --------------------------------------------------  Plan:  -give more iv lasix today 40mg iv x 1 (then restart ? Home lasix 40mg 3x weekly)  -another ekg  -echo pending  -requesting outside cards records (dr. Powell at [facility])  -cards consult, as patient wishes to transfer care to [facility] and pcp to [facility] in future  -rocephin & doxy  -flu pcr  -pharmacy to dose/monitor coumadin/inr  -wean oxygen as tolerates (on none at home)  -replace e'lytes prn  -pt / ot eval    -cbc, bmp, mag, inr in a.m.    DVT Prophylaxis:  coumadin    Home vs rehab 2-3 days depending on clinical  course    CODE STATUS:   Code Status and Medical Interventions:   Ordered at: 02/20/19 5050     Code Status:    CPR     Medical Interventions (Level of Support Prior to Arrest):    Full         Electronically signed by Justus Matias MD, 02/21/19, 9:57 AM.

## 2019-02-22 ENCOUNTER — APPOINTMENT (OUTPATIENT)
Dept: CARDIOLOGY | Facility: HOSPITAL | Age: 79
End: 2019-02-22

## 2019-02-22 VITALS
TEMPERATURE: 98.2 F | SYSTOLIC BLOOD PRESSURE: 114 MMHG | RESPIRATION RATE: 16 BRPM | OXYGEN SATURATION: 98 % | BODY MASS INDEX: 31.34 KG/M2 | WEIGHT: 195 LBS | DIASTOLIC BLOOD PRESSURE: 55 MMHG | HEART RATE: 57 BPM | HEIGHT: 66 IN

## 2019-02-22 PROBLEM — R09.02 HYPOXIA: Status: RESOLVED | Noted: 2019-02-20 | Resolved: 2019-02-22

## 2019-02-22 LAB
ANION GAP SERPL CALCULATED.3IONS-SCNC: 9 MMOL/L (ref 3–11)
BH CV STRESS BP STAGE 1: NORMAL
BH CV STRESS BP STAGE 3: NORMAL
BH CV STRESS BP STAGE 4: NORMAL
BH CV STRESS COMMENTS STAGE 1: NORMAL
BH CV STRESS DOSE REGADENOSON STAGE 1: 0.4
BH CV STRESS DURATION MIN STAGE 1: 1
BH CV STRESS DURATION MIN STAGE 2: 1
BH CV STRESS DURATION MIN STAGE 3: 1
BH CV STRESS DURATION MIN STAGE 4: 1
BH CV STRESS DURATION SEC STAGE 1: 0
BH CV STRESS DURATION SEC STAGE 2: 0
BH CV STRESS DURATION SEC STAGE 3: 0
BH CV STRESS DURATION SEC STAGE 4: 0
BH CV STRESS HR STAGE 1: 58
BH CV STRESS HR STAGE 2: 67
BH CV STRESS HR STAGE 3: 68
BH CV STRESS HR STAGE 4: 67
BH CV STRESS PROTOCOL 1: NORMAL
BH CV STRESS RECOVERY BP: NORMAL MMHG
BH CV STRESS RECOVERY HR: 68 BPM
BH CV STRESS RECOVERY O2: 98 %
BH CV STRESS STAGE 1: 1
BH CV STRESS STAGE 2: 2
BH CV STRESS STAGE 3: 3
BH CV STRESS STAGE 4: 4
BUN BLD-MCNC: 24 MG/DL (ref 9–23)
BUN/CREAT SERPL: 24 (ref 7–25)
CALCIUM SPEC-SCNC: 9.8 MG/DL (ref 8.7–10.4)
CHLORIDE SERPL-SCNC: 105 MMOL/L (ref 99–109)
CO2 SERPL-SCNC: 24 MMOL/L (ref 20–31)
CREAT BLD-MCNC: 1 MG/DL (ref 0.6–1.3)
GFR SERPL CREATININE-BSD FRML MDRD: 54 ML/MIN/1.73
GLUCOSE BLD-MCNC: 92 MG/DL (ref 70–100)
INR PPP: 1.5 (ref 0.85–1.16)
LV EF NUC BP: 77 %
MAGNESIUM SERPL-MCNC: 2.2 MG/DL (ref 1.3–2.7)
MAXIMAL PREDICTED HEART RATE: 143 BPM
PERCENT MAX PREDICTED HR: 51.75 %
POTASSIUM BLD-SCNC: 3.7 MMOL/L (ref 3.5–5.5)
PROTHROMBIN TIME: 17.5 SECONDS (ref 11.2–14.3)
SODIUM BLD-SCNC: 138 MMOL/L (ref 132–146)
STRESS BASELINE BP: NORMAL MMHG
STRESS BASELINE HR: 57 BPM
STRESS O2 SAT REST: 99 %
STRESS PERCENT HR: 61 %
STRESS POST O2 SAT PEAK: 99 %
STRESS POST PEAK BP: NORMAL MMHG
STRESS POST PEAK HR: 74 BPM
STRESS TARGET HR: 122 BPM

## 2019-02-22 PROCEDURE — 97530 THERAPEUTIC ACTIVITIES: CPT

## 2019-02-22 PROCEDURE — 97166 OT EVAL MOD COMPLEX 45 MIN: CPT

## 2019-02-22 PROCEDURE — 93017 CV STRESS TEST TRACING ONLY: CPT

## 2019-02-22 PROCEDURE — 99232 SBSQ HOSP IP/OBS MODERATE 35: CPT | Performed by: INTERNAL MEDICINE

## 2019-02-22 PROCEDURE — 78492 MYOCRD IMG PET MLT RST&STRS: CPT

## 2019-02-22 PROCEDURE — 0 RUBIDIUM CHLORIDE: Performed by: NURSE PRACTITIONER

## 2019-02-22 PROCEDURE — 25010000002 REGADENOSON 0.4 MG/5ML SOLUTION: Performed by: NURSE PRACTITIONER

## 2019-02-22 PROCEDURE — 78492 MYOCRD IMG PET MLT RST&STRS: CPT | Performed by: INTERNAL MEDICINE

## 2019-02-22 PROCEDURE — A9555 RB82 RUBIDIUM: HCPCS | Performed by: NURSE PRACTITIONER

## 2019-02-22 PROCEDURE — 83735 ASSAY OF MAGNESIUM: CPT | Performed by: INTERNAL MEDICINE

## 2019-02-22 PROCEDURE — 93018 CV STRESS TEST I&R ONLY: CPT | Performed by: INTERNAL MEDICINE

## 2019-02-22 PROCEDURE — 85610 PROTHROMBIN TIME: CPT | Performed by: INTERNAL MEDICINE

## 2019-02-22 PROCEDURE — 99239 HOSP IP/OBS DSCHRG MGMT >30: CPT | Performed by: INTERNAL MEDICINE

## 2019-02-22 PROCEDURE — 80048 BASIC METABOLIC PNL TOTAL CA: CPT | Performed by: INTERNAL MEDICINE

## 2019-02-22 RX ORDER — CEFUROXIME AXETIL 500 MG/1
500 TABLET ORAL 2 TIMES DAILY
Qty: 8 TABLET | Refills: 0 | Status: SHIPPED | OUTPATIENT
Start: 2019-02-22 | End: 2019-02-26

## 2019-02-22 RX ORDER — WARFARIN SODIUM 5 MG/1
5 TABLET ORAL
Status: DISCONTINUED | OUTPATIENT
Start: 2019-02-22 | End: 2019-02-22 | Stop reason: HOSPADM

## 2019-02-22 RX ORDER — ATORVASTATIN CALCIUM 20 MG/1
20 TABLET, FILM COATED ORAL NIGHTLY
Status: DISCONTINUED | OUTPATIENT
Start: 2019-02-22 | End: 2019-02-22 | Stop reason: HOSPADM

## 2019-02-22 RX ORDER — ATORVASTATIN CALCIUM 20 MG/1
20 TABLET, FILM COATED ORAL NIGHTLY
Qty: 30 TABLET | Refills: 0 | Status: SHIPPED | OUTPATIENT
Start: 2019-02-22 | End: 2022-08-04

## 2019-02-22 RX ORDER — DOXYCYCLINE 100 MG/1
100 CAPSULE ORAL 2 TIMES DAILY
Qty: 8 CAPSULE | Refills: 0 | Status: SHIPPED | OUTPATIENT
Start: 2019-02-22 | End: 2019-02-26

## 2019-02-22 RX ADMIN — AMIODARONE HYDROCHLORIDE 200 MG: 200 TABLET ORAL at 09:22

## 2019-02-22 RX ADMIN — BACLOFEN 10 MG: 10 TABLET ORAL at 09:22

## 2019-02-22 RX ADMIN — BACLOFEN 10 MG: 10 TABLET ORAL at 16:48

## 2019-02-22 RX ADMIN — WARFARIN SODIUM 5 MG: 5 TABLET ORAL at 18:17

## 2019-02-22 RX ADMIN — LOSARTAN POTASSIUM 50 MG: 50 TABLET ORAL at 09:22

## 2019-02-22 RX ADMIN — ACETAMINOPHEN 650 MG: 325 TABLET, FILM COATED ORAL at 11:27

## 2019-02-22 RX ADMIN — RUBIDIUM CHLORIDE RB-82 1 DOSE: 150 INJECTION, SOLUTION INTRAVENOUS at 08:36

## 2019-02-22 RX ADMIN — REGADENOSON 0.4 MG: 0.08 INJECTION, SOLUTION INTRAVENOUS at 08:35

## 2019-02-22 RX ADMIN — SERTRALINE HYDROCHLORIDE 50 MG: 50 TABLET ORAL at 09:21

## 2019-02-22 RX ADMIN — DOXYCYCLINE 100 MG: 100 INJECTION, POWDER, LYOPHILIZED, FOR SOLUTION INTRAVENOUS at 18:17

## 2019-02-22 RX ADMIN — RUBIDIUM CHLORIDE RB-82 1 DOSE: 150 INJECTION, SOLUTION INTRAVENOUS at 08:26

## 2019-02-22 RX ADMIN — FUROSEMIDE 40 MG: 40 TABLET ORAL at 09:22

## 2019-02-22 RX ADMIN — DOXYCYCLINE 100 MG: 100 INJECTION, POWDER, LYOPHILIZED, FOR SOLUTION INTRAVENOUS at 05:32

## 2019-02-22 NOTE — PLAN OF CARE
Problem: Patient Care Overview  Goal: Plan of Care Review  Outcome: Ongoing (interventions implemented as appropriate)   02/22/19 9888   OTHER   Outcome Summary patient is able to ambulate 40 ft with walker with stand by assist. she fatigues easily

## 2019-02-22 NOTE — PLAN OF CARE
Problem: Patient Care Overview  Goal: Plan of Care Review  Outcome: Outcome(s) achieved Date Met: 02/22/19 02/22/19 2683   Coping/Psychosocial   Plan of Care Reviewed With patient   Plan of Care Review   Progress improving   OTHER   Outcome Summary Pt states she wants to go home tonight to take care of her cat. Son will pick her up appox 2030.

## 2019-02-22 NOTE — DISCHARGE PLACEMENT REQUEST
"Evelyn Philip (78 y.o. Female)     Date of Birth Social Security Number Address Home Phone MRN    1940  [address]  [place] [id number] [phone] [id number]    Anabaptism Marital Status          Anabaptist        Admission Date Admission Type Admitting Provider Attending Provider Department, Room/Bed    19 Emergency Angelina Harris DO Roesch, Lyndsey, DO [facility] 2G, S230/    Discharge Date Discharge Disposition Discharge Destination         Home-Health Care [facility]              Attending Provider:  Angelina Harris DO    Allergies:  Other    Isolation:  None   Infection:  None   Code Status:  CPR    Ht:  167.6 cm (66\")   Wt:  88.5 kg (195 lb)    Admission Cmt:  None   Principal Problem:  CAP (community acquired pneumonia) [J18.9]                 Active Insurance as of 2019     Primary Coverage     Payor Plan Insurance Group Employer/Plan Group    MEDICARE MEDICARE A & B      Payor Plan Address Payor Plan Phone Number Payor Plan Fax Number Effective Dates    [address] [phone]  2006 - None Entered    [facility] [id number]       Subscriber Name Subscriber Birth Date Member ID       EVELYN PHILIP 1940 [id number]           Secondary Coverage     Payor Plan Insurance Group Employer/Plan Group    ANTHEM BLUE CROSS ANTHEM BLUE CROSS BLUE SHIELD PPO [id number]     Payor Plan Address Payor Plan Phone Number Payor Plan Fax Number Effective Dates    [address] [phone]  2017 - None Entered    [facility] [id number]       Subscriber Name Subscriber Birth Date Member ID       EVELYN PHILIP 1940 [id number]                 Emergency Contacts      (Rel.) Home Phone Work Phone Mobile Phone    AIMEE PHILIP (Son) -- -- [phone]               Discharge Summary      Angelina Harris DO at 2019  4:11 PM              [facility] Medicine Services  DISCHARGE SUMMARY    Patient Name: Evelyn Philip  : " 1940  MRN: [id number]    Date of Admission: 2/20/2019  Date of Discharge:  02/22/19  Primary Care Physician: Provider, No Known   Consultation: Cardiology     Consults     Date and Time Order Name Status Description    2/21/2019 0957 Inpatient Cardiology Consult Completed           Hospital Course     Presenting Problem:   CAP (community acquired pneumonia) [J18.9]  Acute on chronic congestive heart failure, unspecified heart failure type (CMS/HCC) [I50.9]    Active Hospital Problems    Diagnosis Date Noted   • **CAP (community acquired pneumonia) [J18.9] 02/20/2019   • Acute on chronic congestive heart failure (CMS/HCC) [I50.9] 02/20/2019   • A-fib (CMS/[facility]) [I48.91] 02/20/2019   • Pleural effusion, bilateral [J90] 02/20/2019   • Pericardial effusion [I31.3] 02/20/2019   • CAD (coronary artery disease) [I25.10] 02/20/2019   • Essential hypertension [I10] 02/20/2019   • Liver nodule [K76.89] 02/20/2019   • Bacteriuria [R82.71] 02/20/2019      Resolved Hospital Problems    Diagnosis Date Noted Date Resolved   • Hypoxia [R09.02] 02/20/2019 02/22/2019          Hospital Course:  Evelyn Rogers is a 78 y.o. female  with history of afib, pelural effusions, anxiety, lupus who presented with fatigue, congestion, cough. Imaging showed small pericardial effusion, trace bilateral pleural effusions and RLL infiltrate as well as 6.5 cm left hepatic nodule. Patient was started on azithromycin and rocephin and given intermittent IV diuresis with lasix. Blood cultures and flu were negative. She progressed off oxygen and currently has been on room air. She will be discharged on ceftin and doxycyline to complete 7 days of antibiotics.     Cardiology was consulted. ECHO with grade II diastolic dysfunction and EF 60-70%. Troponin was mildly elevated. She had a stress test 2/22 which was negative for ischemia. Atorvastatin 20 mg daily was started. She was continued on amiodarone for atrial fib and coumadin was restarted. She will  continue on home dosing of coumadin and PCP and home health follow up. She received intermittent IV diuresis and will resume home diuretics. She will follow up with cardiology in 4-6 weeks.     Bacturia noted on UA. Asymptomatic and just completed treatment for UTI.     PT/OT eval and SNF recommended at discharge, however, patient declined and will be discharged with home health.      Discharge Follow Up Recommendations for labs/diagnostics:  PCP as scheduled 3/8/19  Cardiology Dr. Lee 4-6 weeks     Day of Discharge     HPI:   No acute events. Stress test this morning. States breathing is improved but still having persistent cough. Declines rehab after discharge and would like to be discharged with home health. Has PCP follow up 3/8. Feels comfortable and ready for discharge.     Review of Systems  Gen- No fevers, chills  CV- No chest pain, palpitations  Resp- + cough, no dyspnea  GI- No N/V/D, abd pain      Otherwise ROS is negative except as mentioned in the HPI.    Vital Signs:   Temp:  [97.6 °F (36.4 °C)-98.8 °F (37.1 °C)] 98.3 °F (36.8 °C)  Heart Rate:  [51-65] 61  Resp:  [16-18] 16  BP: ()/(41-63) 103/54     Physical Exam:  Constitutional: No acute distress, awake, alert; sitting up in the chair   HENT: NCAT, mucous membranes moist  Respiratory: Clear to auscultation bilaterally, respiratory effort normal; occ rhonchi   Cardiovascular: RRR, no murmurs, rubs, or gallops, palpable pedal pulses bilaterally  Gastrointestinal: Positive bowel sounds, soft, nontender, nondistended  Musculoskeletal: No bilateral ankle edema  Psychiatric: Appropriate affect, cooperative  Neurologic: Oriented x 3, strength symmetric in all extremities, Cranial Nerves grossly intact to confrontation, speech clear  Skin: No rashes      Pertinent  and/or Most Recent Results     Results from last 7 days   Lab Units 02/22/19  0721 02/21/19 2021 02/21/19  0644 02/21/19  0027 02/20/19  1653   WBC 10*3/mm3  --   --   --   --  4.89    HEMOGLOBIN g/dL  --   --   --   --  13.4   HEMATOCRIT %  --   --   --   --  41.0   PLATELETS 10*3/mm3  --   --   --   --  121*   SODIUM mmol/L 138  --  137 135 137   POTASSIUM mmol/L 3.7 4.1 3.3* 3.4* 3.8   CHLORIDE mmol/L 105  --  104 103 106   CO2 mmol/L 24.0  --  27.0 26.0 26.0   BUN mg/dL 24*  --  16 14 14   CREATININE mg/dL 1.00  --  0.77 0.81 0.82   GLUCOSE mg/dL 92  --  105* 122* 108*   CALCIUM mg/dL 9.8  --  9.3 9.3 10.0     Results from last 7 days   Lab Units 02/22/19  0721 02/21/19  0644 02/21/19  0031 02/20/19  1753 02/20/19  1653   BILIRUBIN mg/dL  --   --   --   --  2.0*   ALK PHOS U/L  --   --   --   --  89   ALT (SGPT) U/L  --   --   --   --  19   AST (SGOT) U/L  --   --   --   --  18   PROTIME Seconds 17.5* 19.3* 18.9* 20.2*  --    INR  1.50* 1.71* 1.67* 1.81*  --            Invalid input(s): TG, LDLCALC, LDLREALC  Results from last 7 days   Lab Units 02/21/19  1115 02/21/19  0645 02/21/19  0644 02/21/19  0027 02/20/19  1855 02/20/19  1654   HEMOGLOBIN A1C %  --  4.80  --   --   --   --    BNP pg/mL  --   --   --   --  546.0*  --    TROPONIN I ng/mL 0.062*  --  0.100* 0.084*  --  0.01       Brief Urine Lab Results  (Last result in the past 365 days)      Color   Clarity   Blood   Leuk Est   Nitrite   Protein   CREAT   Urine HCG        02/20/19 1744 Yellow Clear Trace Negative Negative Negative               Microbiology Results Abnormal     Procedure Component Value - Date/Time    Blood Culture - Blood, Arm, Right [[id number]] Collected:  02/20/19 1800    Lab Status:  Preliminary result Specimen:  Blood from Arm, Right Updated:  02/21/19 1830     Blood Culture No growth at 24 hours    Blood Culture - Blood, Arm, Left [[id number]] Collected:  02/20/19 1805    Lab Status:  Preliminary result Specimen:  Blood from Arm, Left Updated:  02/21/19 1830     Blood Culture No growth at 24 hours    Influenza A & B, RT PCR - Swab, Nasopharynx [[id number]]  (Normal) Collected:  02/21/19 1326    Lab Status:  Final  result Specimen:  Swab from Nasopharynx Updated:  02/21/19 1522     Influenza A PCR Not Detected     Influenza B PCR Not Detected    Influenza Antigen, Rapid - Swab, Nasopharynx [[id number]]  (Normal) Collected:  02/20/19 1747    Lab Status:  Final result Specimen:  Swab from Nasopharynx Updated:  02/20/19 1842     Influenza A Ag, EIA Negative     Influenza B Ag, EIA Negative          Imaging Results (all)     Procedure Component Value Units Date/Time    CT Chest Without Contrast [[id number]] Collected:  02/20/19 1945     Updated:  02/20/19 2039    Narrative:       EXAM:    CT Chest Without Contrast     EXAM DATE/TIME:    2/20/2019 7:45 PM     CLINICAL HISTORY:    78 years old, female; Signs and symptoms; Cough and shortness of breath;   Additional info: Cough, SOB, hypoxia, concern for pneumonia     TECHNIQUE:    Axial computed tomography images of the chest without intravenous contrast.    All CT scans at this facility use at least one of these dose optimization   techniques: automated exposure control; mA and/or kV adjustment per patient   size (includes targeted exams where dose is matched to clinical indication); or   iterative reconstruction.    Coronal reformatted images were created and reviewed.     COMPARISON:    CR XR CHEST 1 VW 2/20/2019 4:21 PM     FINDINGS:    Limitations:  Motion artifact does somewhat limit the sensitivity of this   examination.    Lungs:  Mild patchy RLL infiltrate. Clinically correlates to rule out   pneumonia. Minimal scattered subsegmental atelectasis in bilateral lungs in   multilobar distribution pattern.    Pleural space:  Trace bilateral pleural effusions.  No pneumothorax.   Heart:  There are coronary artery atheromatous calcifications, consistent with   CAD. Mild cardiomegaly. Small pericardial effusion. Some of the pericardial   effusion is within pericardial recesses.    Mediastinum:  Air in distal esophagus versus minimal hiatal hernia.    Aorta:  Aortic atherosclerosis.  No aortic aneurysm.    Lymph nodes:  No gross lymphadenopathy on noncontrast exam.    Bones/joints:  Mild scoliosis. Degenerative changes of the spine.    Soft tissues: Unremarkable.    Liver:  Round hypodense 6.5 cm left hepatic nodule, probably a cyst. US could   characterize.    Gallbladder and bile ducts:  Cholecystectomy.    Spleen:  Unremarkable. No splenomegaly or mass.    Adrenals:  Unremarkable adrenals.    Kidneys and ureters:  Visualized upper kidneys are unremarkable.       Impression:       1. Mild cardiomegaly. CAD. Small pericardial effusion.   2. Trace bilateral pleural effusions.   3. Left hepatic 6.5 cm nodule, probably cyst.   4. Mild patchy RLL infiltrate. Clinically correlates to rule out pneumonia.   5. Minimal bilateral scattered subsegmental atelectasis.   6. Additional findings, as above.     THIS DOCUMENT HAS BEEN ELECTRONICALLY SIGNED BY RULA FAITH MD    XR Chest 1 View [[id number]] Collected:  02/20/19 1713     Updated:  02/20/19 1734    Narrative:       EXAMINATION: XR CHEST 1 VW - 2/20/2019     INDICATION: Weakness, dizziness, altered mental status.     TECHNIQUE: Single frontal view of the chest.      COMPARISONS: None.     FINDINGS:  Cardiomegaly. No focal airspace disease or pneumothorax.  Possible trace left effusion and trace edema. Atherosclerosis.       Impression:       Cardiomegaly. Favor trace edema and left effusion.     DICTATED:   2/20/2019  EDITED/ls :   2/20/2019      This report was finalized on 2/20/2019 5:32 PM by Galo Bautista.                       Results for orders placed during the hospital encounter of 02/20/19   Adult Transthoracic Echo Complete With Contrast if Necessary Per Protocol    Narrative · Left ventricular systolic function is normal.  · Estimated EF appears to be in the range of 66 - 70%.  · Left ventricular diastolic dysfunction (grade II) consistent with   pseudonormalization.  · Moderate tricuspid valve regurgitation is present.  · Left atrial  cavity size is severely dilated.  · Calculated right ventricular systolic pressure from tricuspid   regurgitation is 55 mmHg.           Order Current Status    Blood Culture - Blood, Arm, Left Preliminary result    Blood Culture - Blood, Arm, Right Preliminary result        Discharge Details        Discharge Medications      New Medications      Instructions Start Date   atorvastatin 20 MG tablet  Commonly known as:  LIPITOR   20 mg, Oral, Nightly      cefuroxime 500 MG tablet  Commonly known as:  CEFTIN   500 mg, Oral, 2 Times Daily      doxycycline 100 MG capsule  Commonly known as:  MONODOX   100 mg, Oral, 2 Times Daily         Continue These Medications      Instructions Start Date   ALPRAZolam 0.5 MG tablet  Commonly known as:  XANAX   0.5 mg, Oral, Daily PRN      amiodarone 200 MG tablet  Commonly known as:  PACERONE   200 mg, Oral, Daily      baclofen 10 MG tablet  Commonly known as:  LIORESAL   10 mg, Oral, 3 Times Daily      diphenhydrAMINE-acetaminophen  MG tablet per tablet  Commonly known as:  TYLENOL PM   1 tablet, Oral, Nightly      furosemide 40 MG tablet  Commonly known as:  LASIX   40 mg, Oral, 3 Times Weekly, Monday Wednesday Friday       losartan 50 MG tablet  Commonly known as:  COZAAR   50 mg, Oral, Daily      nystatin 153959 UNIT/GM cream  Commonly known as:  MYCOSTATIN   Topical, 2 Times Daily      sertraline 50 MG tablet  Commonly known as:  ZOLOFT   50 mg, Oral, Daily      warfarin 5 MG tablet  Commonly known as:  COUMADIN   5 mg, Oral, Daily Warfarin             Allergies   Allergen Reactions   • Other Unknown (See Comments)     SOMETHING THAT SHE GOT DURING AN MRI         Discharge Disposition:  Home-Health Care Svc    Discharge Diet:  Diet Order   Procedures   • Diet Regular; Cardiac         Discharge Activity:   Activity Instructions     Activity as Tolerated              CODE STATUS:    Code Status and Medical Interventions:   Ordered at: 02/20/19 4765     Code Status:    CPR      Medical Interventions (Level of Support Prior to Arrest):    Full         No future appointments.    Additional Instructions for the Follow-ups that You Need to Schedule     Ambulatory Referral to Home Health   As directed      Face to Face Visit Date:  2019    Follow-up Provider for Plan of Care?:  I treated the patient in an acute care facility and will not continue treatment after discharge.    Follow-up Provider:  CORNELIO RENAE [[id number]]    Reason/Clinical Findings:  CAP    Describe mobility limitations that make leaving home difficult:  impaired functional mobility, balance, gait and endurance    Nursing/Therapeutic Services Requested:  Physical Therapy Occupational Therapy    PT orders:  Therapeutic exercise Gait Training Strengthening Home safety assessment    Weight Bearing Status:  As Tolerated    Occupational orders:  Activities of daily living Energy conservation Strengthening Home safety assessment         Discharge Follow-up with PCP   As directed       Currently Documented PCP:    Provider, No Known    PCP Phone Number:    None     Follow Up Details:  PCP as scheduled 3/8/19         Discharge Follow-up with Specified Provider: Cardiology Dr. Lee 4-6 weeks   As directed      To:  Cardiology Dr. Lee 4-6 weeks               Time Spent on Discharge:  35 minutes    Electronically signed by Angelina Harris DO, 19, 4:11 PM.        Electronically signed by Angelina Harris DO at 2019  4:28 PM       [address] [address]  Phone:  [phone]  Fax:   Date: 2019      Ambulatory Referral to Home Health     Patient:  Evelyn Rogers MRN:  [id number]   [address]  [place] [id number] :  1940  SSN:    Phone: [phone] Sex:  F      INSURANCE PAYOR PLAN GROUP # SUBSCRIBER ID   Primary:  Secondary:    MEDICARE ANTHEM BLUE CROSS [id number]  [id number]    [id number]  [id number]      Referring Provider  Information:  ANGELINA BARRIENTOS Phone: [phone] Fax:       Referral Information:   # Visits:  1 Referral Type: Home Health [42]   Urgency:  Routine Referral Reason: Specialty Services Required   Start Date: Feb 22, 2019 End Date:  To be determined by Insurer   Diagnosis: Acute on chronic congestive heart failure, unspecified heart failure type (CMS/HCC) (I50.9 [ICD-10-CM] 428.0 [ICD-9-CM])  Impaired functional mobility, balance, gait, and endurance (Z74.09 [ICD-10-CM] V49.89 [ICD-9-CM])      Refer to Dept:   Refer to Provider:   Refer to Facility:       Face to Face Visit Date: 2/22/2019  Follow-up Provider for Plan of Care? I treated the patient in an acute care facility and will not continue treatment after discharge.  Follow-up Provider: CORNELIO RENAE [[id number]]  Reason/Clinical Findings: CAP  Describe mobility limitations that make leaving home difficult: impaired functional mobility, balance, gait and endurance  Nursing/Therapeutic Services Requested: Physical Therapy  Nursing/Therapeutic Services Requested: Occupational Therapy  PT orders: Therapeutic exercise  PT orders: Gait Training  PT orders: Strengthening  PT orders: Home safety assessment  Weight Bearing Status: As Tolerated  Occupational orders: Activities of daily living  Occupational orders: Energy conservation  Occupational orders: Strengthening  Occupational orders: Home safety assessment     This document serves as a request of services and does not constitute Insurance authorization or approval of services.  To determine eligibility, please contact the members Insurance carrier to verify and review coverage.     If you have medical questions regarding this request for services. Please contact [address] at [phone] between the hours of 8:00am - 5:00pm (Mon-Fri).       Verbal Order Mode: Per protocol: cosign required  Authorizing Provider: Angelina Barrientos DO  Authorizing Provider's NPI: [id number]     Order Entered By:  Adelia Gloria RN 2/22/2019 12:51 PM     Electronically signed by: Angelina Harris DO 2/22/2019  3:18 PM

## 2019-02-22 NOTE — PROGRESS NOTES
"[facility] Progress Note     LOS: 2 days   Patient Care Team:  Provider, No Known as PCP - General  PCP:  Provider, No Known    Chief Complaint: Elevated troponin in the setting of pneumonia    SUBJECTIVE:   Sitting up in bed.  Feeling much better today.  Decrease shortness of breath.  Denies chest pain or palpitations.      Review of Systems:   All systems have been reviewed and are negative with the exception of those mentioned above.      OBJECTIVE:    Vital Sign Min/Max for last 24 hours  Temp  Min: 97.6 °F (36.4 °C)  Max: 98.8 °F (37.1 °C)   BP  Min: 92/41  Max: 123/63   Pulse  Min: 51  Max: 65   Resp  Min: 16  Max: 18   SpO2  Min: 88 %  Max: 98 %   No Data Recorded   Weight  Min: 88.5 kg (195 lb)  Max: 88.5 kg (195 lb)     Flowsheet Rows      First Filed Value   Admission Height  167.6 cm (66\") Documented at 02/20/2019 1508   Admission Weight  88 kg (194 lb) Documented at 02/20/2019 1508            Intake/Output Summary (Last 24 hours) at 2/22/2019 1334  Last data filed at 2/21/2019 1400  Gross per 24 hour   Intake --   Output 950 ml   Net -950 ml     Intake & Output (last 3 days)       02/19 0701 - 02/20 0700 02/20 0701 - 02/21 0700 02/21 0701 - 02/22 0700 02/22 0701 - 02/23 0700    IV Piggyback  50      Total Intake(mL/kg)  50 (0.6)      Urine (mL/kg/hr)   950 (0.4)     Stool   0     Total Output   950     Net  +50 -950             Urine Unmeasured Occurrence   1 x     Stool Unmeasured Occurrence   1 x            Physical Exam:    General Appearance:    Alert, cooperative, no distress, appears stated age   Neck:   Supple, symmetrical, trachea midline.   Lungs:     Clear to auscultation bilaterally, respirations unlabored   Chest Wall:    No tenderness or deformity    Heart:    Regular rate and rhythm, S1 and S2 normal, no murmur, rub   or gallop, normal carotid impulse bilaterally without bruit.   Extremities:   Extremities normal, atraumatic, no cyanosis or edema "   Pulses:   2+ and symmetric all extremities   Skin:   Skin color, texture, turgor normal, no rashes or lesions      LABS/DIAGNOSTIC DATA:  Results from last 7 days   Lab Units 02/20/19  1653   WBC 10*3/mm3 4.89   HEMOGLOBIN g/dL 13.4   HEMATOCRIT % 41.0   PLATELETS 10*3/mm3 121*     No results found for: TROPONINT  Results from last 7 days   Lab Units 02/22/19  0721 02/21/19  0644 02/21/19  0031   INR  1.50* 1.71* 1.67*     Results from last 7 days   Lab Units 02/22/19  0721 02/21/19 2021 02/21/19  0644 02/21/19  0027 02/20/19  1653   SODIUM mmol/L 138  --  137 135 137   POTASSIUM mmol/L 3.7 4.1 3.3* 3.4* 3.8   CHLORIDE mmol/L 105  --  104 103 106   CO2 mmol/L 24.0  --  27.0 26.0 26.0   BUN mg/dL 24*  --  16 14 14   CREATININE mg/dL 1.00  --  0.77 0.81 0.82   CALCIUM mg/dL 9.8  --  9.3 9.3 10.0   BILIRUBIN mg/dL  --   --   --   --  2.0*   ALK PHOS U/L  --   --   --   --  89   ALT (SGPT) U/L  --   --   --   --  19   AST (SGOT) U/L  --   --   --   --  18   GLUCOSE mg/dL 92  --  105* 122* 108*     Results from last 7 days   Lab Units 02/21/19  0645   HEMOGLOBIN A1C % 4.80             Results from last 7 days   Lab Units 02/20/19  1855   BNP pg/mL 546.0*       Medication Review:     amiodarone 200 mg Oral Daily   atorvastatin 20 mg Oral Nightly   baclofen 10 mg Oral TID   ceftriaxone 1 g Intravenous Q24H   And      doxycycline 100 mg Intravenous Q12H   furosemide 40 mg Oral Once per day on Mon Wed Fri   losartan 50 mg Oral Daily   sertraline 50 mg Oral Daily   sodium chloride 3 mL Intravenous Q12H        Pharmacy to dose warfarin           CAP (community acquired pneumonia)    Acute on chronic congestive heart failure (CMS/HCC)    Hypoxia    A-fib (CMS/HCC)    Pleural effusion, bilateral    Pericardial effusion    CAD (coronary artery disease)    Essential hypertension    Liver nodule    Bacteriuria    Cardiac studies and procedures:   Echo  · Estimated EF appears to be in the range of 66 - 70%.  · Left ventricular  diastolic dysfunction (grade II) consistent with pseudonormalization.  · Moderate tricuspid valve regurgitation is present.  · Left atrial cavity size is severely dilated.  · Calculated right ventricular systolic pressure from tricuspid regurgitation is 55 mmHg.  Myocardial perfusion imaging  · Rest EF=70% Stress EF=77%.  · Myocardial perfusion imaging indicates a normal myocardial perfusion study with no evidence of ischemia.    ASSESSMENT/PLAN:  History of coronary artery disease: Stable and asymptomatic, reassuring perfusion imaging  Diastolic congestive heart failure: Currently euvolemic and compensated  Paroxysmal atrial fibrillation    Continue amiodarone, restart anticoagulation with Coumadin with a goal INR of 2-3.  Starting atorvastatin 20 mg p.o. Daily.  Follow-up in 4-6 weeks.  Will sign off for now.  Thank you.          Gerry Lee III, MD   02/22/19  1:34 PM

## 2019-02-22 NOTE — PLAN OF CARE
Problem: Patient Care Overview  Goal: Plan of Care Review  Outcome: Ongoing (interventions implemented as appropriate)   02/22/19 1121   Coping/Psychosocial   Plan of Care Reviewed With patient   Plan of Care Review   Progress improving   OTHER   Outcome Summary OT eval completed Pt presents with balance, strength, and activity tolerance deficits for ADL completion; CGA at  for STS and bed to chair transfer, completed functional mob 40 ft in room at  with CGA progressing towards SBA at RW, recom FWW and would benefit from tub transfer bench potentially; recom IPOT d/c HHOT vs SNF pending progress due to lives alone

## 2019-02-22 NOTE — PLAN OF CARE
Problem: Patient Care Overview  Goal: Plan of Care Review  Outcome: Ongoing (interventions implemented as appropriate)   02/21/19 2000 02/22/19 0447   Coping/Psychosocial   Plan of Care Reviewed With patient --    Plan of Care Review   Progress --  no change   OTHER   Outcome Summary --  Pt up with 2, purewick in place, bradycardic throughout night, 2L NC, NPO since midnight, stress test today     Goal: Individualization and Mutuality  Outcome: Ongoing (interventions implemented as appropriate)   02/22/19 0447   Individualization   Patient Specific Interventions Ensure call light is wihtin reach       Problem: Pneumonia (Adult)  Goal: Signs and Symptoms of Listed Potential Problems Will be Absent, Minimized or Managed (Pneumonia)  Outcome: Ongoing (interventions implemented as appropriate)   02/22/19 0447   Goal/Outcome Evaluation   Problems Assessed (Pneumonia) all   Problems Present (Pneumonia) infection progression       Problem: Skin Injury Risk (Adult)  Goal: Identify Related Risk Factors and Signs and Symptoms  Outcome: Ongoing (interventions implemented as appropriate)   02/22/19 0447   Skin Injury Risk (Adult)   Related Risk Factors (Skin Injury Risk) advanced age;mobility impaired     Goal: Skin Health and Integrity  Outcome: Ongoing (interventions implemented as appropriate)   02/22/19 0447   Skin Injury Risk (Adult)   Skin Health and Integrity making progress toward outcome

## 2019-02-22 NOTE — THERAPY EVALUATION
Acute Care - Occupational Therapy Initial Evaluation  [facility]     Patient Name: Evelyn Rogers  : 1940  MRN: [id number]  Today's Date: 2019  Onset of Illness/Injury or Date of Surgery: 19  Date of Referral to OT: 19  Referring Physician: RUSS Paige    Admit Date: 2019       ICD-10-CM ICD-9-CM   1. Acute on chronic congestive heart failure, unspecified heart failure type (CMS/HCC) I50.9 428.0   2. Pneumonia of right lower lobe due to infectious organism (CMS/HCC) J18.1 486   3. Hypoxia R09.02 799.02   4. Impaired functional mobility, balance, gait, and endurance Z74.09 V49.89   5. Impaired mobility and ADLs Z74.09 799.89     Patient Active Problem List   Diagnosis   • CAP (community acquired pneumonia)   • Acute on chronic congestive heart failure (CMS/HCC)   • Hypoxia   • A-fib (CMS/HCC)   • Pleural effusion, bilateral   • Pericardial effusion   • CAD (coronary artery disease)   • Essential hypertension   • Liver nodule   • Bacteriuria     Past Medical History:   Diagnosis Date   • CAD (coronary artery disease)    • CHF (congestive heart failure) (CMS/HCC)    • Hypertension      No past surgical history on file.       OT ASSESSMENT FLOWSHEET (last 72 hours)      Occupational Therapy Evaluation     Row Name 19 1030                   OT Evaluation Time/Intention    Subjective Information  complains of;weakness;fatigue  -KF        Document Type  evaluation  -KF        Mode of Treatment  individual therapy;occupational therapy  -KF        Patient Effort  good  -KF        Symptoms Noted During/After Treatment  none  -KF           General Information    Patient Profile Reviewed?  yes  -KF        Onset of Illness/Injury or Date of Surgery  19  -KF        Referring Physician  RUSS Paige  -KF        Patient Observations  alert;cooperative;agree to therapy  -KF        Patient/Family Observations  no family present  -KF        General Observations of Patient  supine, RN  cleared, NC  -KF        Prior Level of Function  independent:;all household mobility;gait;transfer;ADL's;bed mobility;cooking;cleaning;dependent:;driving;shopping  -KF        Equipment Currently Used at Home  none  -KF        Pertinent History of Current Functional Problem  Pt 77 yo female hx of pleural effusion, lupus, anxiety, osteosarcoma s/p 60 yrs ago; presents c/o increased fatigue and weakness with PNA,B pleural effusions  -KF        Existing Precautions/Restrictions  fall;oxygen therapy device and L/min  -KF        Risks Reviewed  patient:;LOB;nausea/vomiting;dizziness;increased discomfort;change in vital signs  -KF        Benefits Reviewed  patient:;improve function;increase independence;increase strength;increase balance;decrease pain;increase knowledge  -KF        Barriers to Rehab  none identified  -KF           Relationship/Environment    Lives With  alone  -KF           Resource/Environmental Concerns    Current Living Arrangements  home/apartment/condo  -KF           Cognitive Assessment/Interventions    Additional Documentation  Cognitive Assessment/Intervention (Group)  -KF           Cognitive Assessment/Intervention- PT/OT    Affect/Mental Status (Cognitive)  WFL  -KF        Orientation Status (Cognition)  oriented x 3;verbal cues/prompts needed for orientation;place cued for name of hospital   -KF        Follows Commands (Cognition)  follows one step commands;over 90% accuracy  -KF        Cognitive Function (Cognitive)  memory deficit;safety deficit  -KF        Memory Deficit (Cognitive)  mild deficit;short term memory  -KF        Safety Deficit (Cognitive)  mild deficit;insight into deficits/self awareness;awareness of need for assistance;judgment  -KF        Cognitive Interventions (Cognitive)  occupation/activity based interventions;process/task specific training  -KF        Personal Safety Interventions  fall prevention program maintained;gait belt;muscle strengthening facilitated;nonskid  shoes/slippers when out of bed  -KF        Cognitive Assessment/Intervention Comment  ed on use of RW and work on better judgement for safe functional transfers  -KF           Safety Issues, Functional Mobility    Safety Issues Affecting Function (Mobility)  judgment;insight into deficits/self awareness  -KF        Impairments Affecting Function (Mobility)  balance;strength  -KF           Bed Mobility Assessment/Treatment    Bed Mobility Assessment/Treatment  rolling left;scooting/bridging;supine-sit  -KF        Rolling Left [place] (Bed Mobility)  conditional independence  -KF        Scooting/Bridging [place] (Bed Mobility)  independent  -KF        Supine-Sit [place] (Bed Mobility)  conditional independence  -KF        Bed Mobility, Safety Issues  decreased use of legs for bridging/pushing  -KF        Assistive Device (Bed Mobility)  bed rails;head of bed elevated  -KF        Comment (Bed Mobility)  HOB slightly elevated, bed rails used   -KF           Functional Mobility    Functional Mobility- Ind. Level  contact guard assist;verbal cues required  -KF        Functional Mobility- Device  rolling walker  -KF        Functional Mobility-Distance (Feet)  40  -KF        Functional Mobility- Safety Issues  supplemental O2  -KF        Functional Mobility- Comment  ed provided on use of RW to improve safety with transfers and use of sliders for carpet surface on RW to improve safety  -KF           Transfer Assessment/Treatment    Transfer Assessment/Treatment  sit-stand transfer;stand-sit transfer;bed-chair transfer  -KF        Comment (Transfers)  cues for HP upon sitting  -KF           Bed-Chair Transfer    Bed-Chair [place] (Transfers)  contact guard;verbal cues  -KF        Assistive Device (Bed-Chair Transfers)  walker, front-wheeled  -KF           Sit-Stand Transfer    Sit-Stand [place] (Transfers)  stand by assist;verbal cues  -KF        Assistive Device (Sit-Stand Transfers)  walker,  front-wheeled  -KF           Stand-Sit Transfer    Stand-Sit [place] (Transfers)  supervision;verbal cues  -KF        Assistive Device (Stand-Sit Transfers)  walker, front-wheeled  -KF           ADL Assessment/Intervention    BADL Assessment/Intervention  upper body dressing;lower body dressing  -KF           Upper Body Dressing Assessment/Training    Upper Body Dressing [place] Level  don;front opening garment;set up  -KF        Upper Body Dressing Position  edge of bed sitting  -KF        Comment (Upper Body Dressing)  cues for seq, but available AROM for UB dressing  -KF           Lower Body Dressing Assessment/Training    Lower Body Dressing [place] Level  don;doff;socks;supervision;verbal cues  -KF        Lower Body Dressing Position  supported sitting  -KF        Comment (Lower Body Dressing)  able to doff/don socks with extended time at chair; ed provided on comp tech for undergarment with reacher PRN  -KF           BADL Safety/Performance    Impairments, BADL Safety/Performance  balance;endurance/activity tolerance;range of motion;strength  -KF        Skilled BADL Treatment/Intervention  BADL process/adaptation training;cognitive/safety deficit modifications;compensatory training;adaptive equipment training  -KF           General ROM    GENERAL ROM COMMENTS  BUE WFL  -KF           MMT (Manual Muscle Testing)    General MMT Comments  BUE grossly 4+/5  -KF           Motor Assessment/Interventions    Additional Documentation  Balance (Group);Balance Interventions (Group)  -KF           Balance    Balance  static sitting balance;static standing balance;dynamic sitting balance;dynamic standing balance  -KF           Static Sitting Balance    Level of [place] (Unsupported Sitting, Static Balance)  supervision  -KF        Sitting Position (Unsupported Sitting, Static Balance)  sitting on edge of bed  -KF        Time Able to Maintain Position (Unsupported Sitting, Static Balance)  4 to 5 minutes   -KF        Comment (Unsupported Sitting, Static Balance)  no LOB supervision for body mechanics   -KF           Dynamic Sitting Balance    Level of [place], Reaches Outside Midline (Sitting, Dynamic Balance)  supervision  -KF        Sitting Position, Reaches Outside Midline (Sitting, Dynamic Balance)  sitting in chair  -KF        Comment, Reaches Outside Midline (Sitting, Dynamic Balance)  LB dressing  -KF           Static Standing Balance    Level of [place] (Supported Standing, Static Balance)  standby assist  -KF        Time Able to Maintain Position (Supported Standing, Static Balance)  2 to 3 minutes  -KF        Assistive Device Utilized (Supported Standing, Static Balance)  walker, rolling  -KF           Dynamic Standing Balance    Level of [place], Reaches Outside Midline (Standing, Dynamic Balance)  contact guard assist  -KF        Time Able to Maintain Position, Reaches Outside Midline (Standing, Dynamic Balance)  2 to 3 minutes  -KF        Assistive Device Utilized (Supported Standing, Dynamic Balance)  walker, rolling  -KF        Comment, Reaches Outside Midline (Standing, Dynamic Balance)  funct tf, mobility  -KF           Sensory Assessment/Intervention    Sensory General Assessment  no sensation deficits identified  -KF           Positioning and Restraints    Pre-Treatment Position  in bed  -KF        Post Treatment Position  chair  -KF        In Chair  notified nsg;reclined;sitting;call light within reach;encouraged to call for assist;exit alarm on;legs elevated  -KF           Pain Assessment    Additional Documentation  Pain Scale: Numbers Pre/Post-Treatment (Group)  -KF           Pain Scale: Numbers Pre/Post-Treatment    Pain Scale: Numbers, Pretreatment  0/10 - no pain  -KF        Pain Scale: Numbers, Post-Treatment  0/10 - no pain  -KF        Pre/Post Treatment Pain Comment  tolerated  -KF        Pain Intervention(s)  Repositioned;Ambulation/increased activity  -KF           Plan of  Care Review    Plan of Care Reviewed With  patient  -KF           Clinical Impression (OT)    Date of Referral to OT  02/21/19  -KF        OT Diagnosis  ADL decline  -KF        Patient/Family Goals Statement (OT Eval)  PLOF, wants to return home  -KF        Criteria for Skilled Therapeutic Interventions Met (OT Eval)  yes;treatment indicated  -KF        Rehab Potential (OT Eval)  good, to achieve stated therapy goals  -KF        Therapy Frequency (OT Eval)  daily  -KF        Care Plan Review (OT)  evaluation/treatment results reviewed;care plan/treatment goals reviewed;risks/benefits reviewed;current/potential barriers reviewed;patient/other agree to care plan  -KF        Anticipated Equipment Needs at Discharge (OT)  front wheeled walker;tub bench would benefit from tub transfer bench  -KF        Anticipated Discharge Disposition (OT)  home with assist;home with home health;skilled nursing facility  -KF           Vital Signs    Pre Systolic BP Rehab  115  -KF        Pre Treatment Diastolic BP  63 RN cleared no dizziness, vitals stable   -KF        Pretreatment Heart Rate (beats/min)  67  -KF        Pre SpO2 (%)  99  -KF        O2 Delivery Pre Treatment  supplemental O2  -KF        Intra SpO2 (%)  96  -KF        O2 Delivery Intra Treatment  supplemental O2  -KF        Post SpO2 (%)  98  -KF        O2 Delivery Post Treatment  supplemental O2  -KF        Pre Patient Position  Supine  -KF        Intra Patient Position  Standing  -KF        Post Patient Position  Sitting  -KF        Rest Breaks   1  -KF           Planned OT Interventions    Planned Therapy Interventions (OT Eval)  activity tolerance training;adaptive equipment training;BADL retraining;cognitive/visual perception retraining;edema control/reduction;functional balance retraining;IADL retraining;neuromuscular control/coordination retraining;occupation/activity based interventions;patient/caregiver education/training;ROM/therapeutic exercise;strengthening  exercise;transfer/mobility retraining  -KF           OT Goals    Transfer Goal Selection (OT)  transfer, OT goal 1  -KF        Bathing Goal Selection (OT)  bathing, OT goal 1  -KF        Dressing Goal Selection (OT)  dressing, OT goal 1  -KF        Safety Awareness Goal Selection (OT)  safety awareness, OT goal 1  -KF        Additional Documentation  Safety Awareness Goal Selection (OT) (Row)  -KF           Transfer Goal 1 (OT)    Activity/Assistive Device (Transfer Goal 1, OT)  bed-to-chair/chair-to-bed;walker, rolling;commode  -KF        [place] Level/Cues Needed (Transfer Goal 1, OT)  supervision required;verbal cues required  -KF        Time Frame (Transfer Goal 1, OT)  long term goal (LTG);by discharge  -KF        Progress/Outcome (Transfer Goal 1, OT)  goal ongoing  -KF           Bathing Goal 1 (OT)    Activity/Assistive Device (Bathing Goal 1, OT)  lower body bathing;long-handled sponge AE prn  -KF        [place] Level/Cues Needed (Bathing Goal 1, OT)  conditional independence  -KF        Time Frame (Bathing Goal 1, OT)  long term goal (LTG);by discharge  -KF        Progress/Outcomes (Bathing Goal 1, OT)  goal ongoing  -KF           Dressing Goal 1 (OT)    Activity/Assistive Device (Dressing Goal 1, OT)  lower body dressing undergarment reacher prn  -KF        [place]/Cues Needed (Dressing Goal 1, OT)  supervision required  -KF        Time Frame (Dressing Goal 1, OT)  long term goal (LTG);by discharge  -KF        Progress/Outcome (Dressing Goal 1, OT)  goal ongoing  -KF           Safety Awareness Goal 1 (OT)    Activity (Safety Awareness Goal 1, OT)  safe use of assistive device/equipment;demonstration of safe behaviors  -KF        [place]/Cues/Accuracy (Safety Awareness Goal 1, OT)  with minimum;verbal cues/redirection  -KF        Time Frame (Safety Awareness Goal 1, OT)  long term goal (LTG);by discharge  -KF        Progress/Outcome (Safety Awareness Goal 1, OT)  goal ongoing  -KF            Living Environment    Home Accessibility  tub/shower is not walk in denies stairs or steps in home  -          User Key  (r) = Recorded By, (t) = Taken By, (c) = Cosigned By    Initials Name Effective Dates    Maricel Adams, OT 04/03/18 -          Occupational Therapy Education     Title: PT OT SLP Therapies (In Progress)     Topic: Occupational Therapy (In Progress)     Point: ADL training (In Progress)     Description: Instruct learner(s) on proper safety adaptation and remediation techniques during self care or transfers.   Instruct in proper use of assistive devices.    Learning Progress Summary           Patient Acceptance, E,TB,D, NR by  at 2/22/2019 10:30 AM    Comment:  Purpose/role of skilled OT services, safety with RW and RW sequencing for functional transfers and ADL completion, comp LB dressing strategies                   Point: Home exercise program (In Progress)     Description: Instruct learner(s) on appropriate technique for monitoring, assisting and/or progressing therapeutic exercises/activities.    Learning Progress Summary           Patient Acceptance, E,TB,D, NR by KF at 2/22/2019 10:30 AM    Comment:  Purpose/role of skilled OT services, safety with RW and RW sequencing for functional transfers and ADL completion, comp LB dressing strategies                   Point: Precautions (In Progress)     Description: Instruct learner(s) on prescribed precautions during self-care and functional transfers.    Learning Progress Summary           Patient Acceptance, E,TB,D, NR by KF at 2/22/2019 10:30 AM    Comment:  Purpose/role of skilled OT services, safety with RW and RW sequencing for functional transfers and ADL completion, comp LB dressing strategies                   Point: Body mechanics (In Progress)     Description: Instruct learner(s) on proper positioning and spine alignment during self-care, functional mobility activities and/or exercises.    Learning Progress Summary            Patient Acceptance, E,TB,D, NR by KF at 2/22/2019 10:30 AM    Comment:  Purpose/role of skilled OT services, safety with RW and RW sequencing for functional transfers and ADL completion, comp LB dressing strategies                               User Key     Initials Effective Dates Name Provider Type Discipline     04/03/18 -  Maricel Talamantes, OT Occupational Therapist OT                  OT Recommendation and Plan  Outcome Summary/Treatment Plan (OT)  Anticipated Equipment Needs at Discharge (OT): front wheeled walker, tub bench(would benefit from tub transfer bench)  Anticipated Discharge Disposition (OT): home with assist, home with home health, skilled nursing facility  Planned Therapy Interventions (OT Eval): activity tolerance training, adaptive equipment training, BADL retraining, cognitive/visual perception retraining, edema control/reduction, functional balance retraining, IADL retraining, neuromuscular control/coordination retraining, occupation/activity based interventions, patient/caregiver education/training, ROM/therapeutic exercise, strengthening exercise, transfer/mobility retraining  Therapy Frequency (OT Eval): daily  Plan of Care Review  Plan of Care Reviewed With: patient  Plan of Care Reviewed With: patient  Outcome Summary: OT eval completed Pt presents with balance, strength, and activity tolerance deficits for ADL completion; CGA at RW for STS and bed to chair transfer, completed functional mob 40 ft in room at RW with CGA progressing towards SBA at RW, recom FWW and would benefit from tub transfer bench potentially; recom IPOT d/c HHOT     Outcome Measures     Row Name 02/22/19 1030 02/21/19 1420          How much help from another person do you currently need...    Turning from your back to your side while in flat bed without using bedrails?  --  4  -SC     Moving from lying on back to sitting on the side of a flat bed without bedrails?  --  3  -SC     Moving to and from a bed to a  chair (including a wheelchair)?  --  3  -SC     Standing up from a chair using your arms (e.g., wheelchair, bedside chair)?  --  3  -SC     Climbing 3-5 steps with a railing?  --  2  -SC     To walk in hospital room?  --  3  -SC     AM-PAC 6 Clicks Score  --  18  -SC        How much help from another is currently needed...    Putting on and taking off regular lower body clothing?  3  -KF  --     Bathing (including washing, rinsing, and drying)  3  -KF  --     Toileting (which includes using toilet bed pan or urinal)  3  -KF  --     Putting on and taking off regular upper body clothing  4  -KF  --     Taking care of personal grooming (such as brushing teeth)  4  -KF  --     Eating meals  4  -KF  --     Score  21  -KF  --        Functional Assessment    Outcome Measure Options  AM-PAC 6 Clicks Daily Activity (OT)  -KF  AM-[facility] 6 Clicks Basic Mobility (PT)  -SC       User Key  (r) = Recorded By, (t) = Taken By, (c) = Cosigned By    Initials Name Provider Type    SC Neema Oliva, PT Physical Therapist    Maricel Adams, OT Occupational Therapist          Time Calculation:   Time Calculation- OT     Row Name 02/22/19 1030             Time Calculation- OT    OT Start Time  1030  -KF      Total Timed Code Minutes- OT  10 minute(s)  -KF      OT Received On  02/22/19  -      OT Goal Re-Cert Due Date  03/04/19  -         Timed Charges    00755 - OT Therapeutic Activity Minutes  10  -KF        User Key  (r) = Recorded By, (t) = Taken By, (c) = Cosigned By    Initials Name Provider Type    Maricel Adams, OT Occupational Therapist        Therapy Suggested Charges     Code   Minutes Charges    35819 (CPT®) Hc Ot Neuromusc Re Education Ea 15 Min      11940 (CPT®) Hc Ot Ther Proc Ea 15 Min      62006 (CPT®) Hc Ot Therapeutic Act Ea 15 Min 10 1    87658 (CPT®) Hc Ot Manual Therapy Ea 15 Min      11613 (CPT®) Hc Ot Iontophoresis Ea 15 Min      11867 (CPT®) Hc Ot Elec Stim Ea-Per 15 Min      05149 (CPT®) Hc Ot  Ultrasound Ea 15 Min      16575 (CPT®)  Ot Self Care/Mgmt/Train Ea 15 Min      Total  10 1        Therapy Charges for Today     Code Description Service Date Service Provider Modifiers Qty    [id number]  OT THERAPEUTIC ACT EA 15 MIN 2/22/2019 Maricel Talamantes, OT GO 1    [id number]  OT EVAL MOD COMPLEXITY 3 2/22/2019 Maricel Talamantes, OT GO 1               Maricel Talamantes OT  2/22/2019

## 2019-02-22 NOTE — THERAPY TREATMENT NOTE
Acute Care - Physical Therapy Treatment Note  [facility]     Patient Name: Evelyn Rogers  : 1940  MRN: [id number]  Today's Date: 2019  Onset of Illness/Injury or Date of Surgery: 19  Date of Referral to PT: 19  Referring Physician: RUSS Paige    Admit Date: 2019    Visit Dx:    ICD-10-CM ICD-9-CM   1. Acute on chronic congestive heart failure, unspecified heart failure type (CMS/HCC) I50.9 428.0   2. Pneumonia of right lower lobe due to infectious organism (CMS/HCC) J18.1 486   3. Hypoxia R09.02 799.02   4. Impaired functional mobility, balance, gait, and endurance Z74.09 V49.89   5. Impaired mobility and ADLs Z74.09 799.89     Patient Active Problem List   Diagnosis   • CAP (community acquired pneumonia)   • Acute on chronic congestive heart failure (CMS/HCC)   • Hypoxia   • A-fib (CMS/[facility])   • Pleural effusion, bilateral   • Pericardial effusion   • CAD (coronary artery disease)   • Essential hypertension   • Liver nodule   • Bacteriuria       Therapy Treatment    Rehabilitation Treatment Summary     Row Name 19 1345             Treatment Time/Intention    Discipline  physical therapist  -MIRZA      Document Type  therapy note (daily note)  -MIRZA      Subjective Information  complains of;weakness;fatigue  -MIRZA      Mode of Treatment  physical therapy  -MIRZA      Care Plan Review  care plan/treatment goals reviewed;risks/benefits reviewed;patient/other agree to care plan  -MIRZA      Therapy Frequency (PT Clinical Impression)  daily  -MIRZA      Patient Effort  good  -MIRZA      Comment  patient has had diarrhea today and is unable to increase activity   -MIRZA      Existing Precautions/Restrictions  fall  -MIRZA      Recorded by [MIRZA] Waleska Perez, PT 19 1436      Row Name 19 1345             Vital Signs    Pre SpO2 (%)  99  -MIRZA      O2 Delivery Pre Treatment  room air  -MIRZA      Post SpO2 (%)  97  -MIRZA      O2 Delivery Post Treatment  room air  -MIRZA      Pre Patient Position   Sitting  -MIRZA      Intra Patient Position  Standing  -MIRZA      Post Patient Position  Supine  -MIRZA      Recorded by [MIRZA] Waleska Perez, PT 02/22/19 1436      Row Name 02/22/19 1345             Cognitive Assessment/Intervention- PT/OT    Affect/Mental Status (Cognitive)  WFL  -MIRZA      Orientation Status (Cognition)  oriented x 4  -MIRZA      Follows Commands (Cognition)  follows one step commands;over 90% accuracy  -MIRZA      Safety Deficit (Cognitive)  safety precautions awareness;safety precautions follow-through/compliance  -MIRZA      Personal Safety Interventions  fall prevention program maintained;gait belt;nonskid shoes/slippers when out of bed  -MIRZA      Recorded by [MIRZA] Waleska Perez, PT 02/22/19 1436      Row Name 02/22/19 1345             Safety Issues, Functional Mobility    Safety Issues Affecting Function (Mobility)  safety precaution awareness;safety precautions follow-through/compliance  -MIRZA      Impairments Affecting Function (Mobility)  balance;endurance/activity tolerance;strength  -MIRZA      Recorded by [MIRZA] Waleska Perez, PT 02/22/19 1436      Row Name 02/22/19 1345             Bed Mobility Assessment/Treatment    Rolling Left [place] (Bed Mobility)  conditional independence  -MIRZA      Scooting/Bridging [place] (Bed Mobility)  independent  -MIRZA      Supine-Sit [place] (Bed Mobility)  conditional independence  -MIRZA      Sit-Supine [place] (Bed Mobility)  conditional independence  -MIRZA      Bed Mobility, Safety Issues  decreased use of arms for pushing/pulling  -MIRZA      Assistive Device (Bed Mobility)  bed rails  -MIRZA      Recorded by [MIRZA] Waleska Perez, PT 02/22/19 1436      Row Name 02/22/19 1345             Transfer Assessment/Treatment    Transfer Assessment/Treatment  sit-stand transfer;stand-sit transfer;toilet transfer  -MIRZA      Recorded by [MIRZA] Waleska Perez, PT 02/22/19 1436      Row Name 02/22/19 1345             Sit-Stand Transfer    Sit-Stand [place]  (Transfers)  stand by assist  -MIRZA      Assistive Device (Sit-Stand Transfers)  walker, front-wheeled  -MIRZA      Recorded by [MIRZA] Waleska Perez, PT 02/22/19 1436      Row Name 02/22/19 1345             Stand-Sit Transfer    Stand-Sit [place] (Transfers)  stand by assist  -MIRZA      Assistive Device (Stand-Sit Transfers)  walker, front-wheeled  -MIRZA      Recorded by [MIRZA] Waleska Perez, PT 02/22/19 1436      Row Name 02/22/19 1345             Toilet Transfer    Type (Toilet Transfer)  sit-stand;stand-sit  -MIRZA      [place] Level (Toilet Transfer)  stand by assist  -MIRZA      Assistive Device (Toilet Transfer)  commode;walker, front-wheeled  -MIRZA      Recorded by [MIRZA] Waleska Perez, PT 02/22/19 1436      Row Name 02/22/19 1345             Gait/Stairs Assessment/Training    Gait/Stairs Assessment/Training  gait/ambulation independence  -MIRZA      [place] Level (Gait)  stand by assist  -MIRZA      Assistive Device (Gait)  walker, front-wheeled  -MIRZA      Distance in Feet (Gait)  40  -MIRZA      Pattern (Gait)  step-through  -MIRZA      Comment (Gait/Stairs)  patient unable to increase ambulation distance due to she has been up and down to the commode several times today with diarrhea  -MIRZA      Recorded by [MIRZA] Waleska Perez, PT 02/22/19 1436      Row Name 02/22/19 1345             Motor Skills Assessment/Interventions    Additional Documentation  Balance (Group);Therapeutic Exercise (Group);Therapeutic Exercise Interventions (Group)  -MIRZA      Recorded by [MIRZA] Waleska Perez, PT 02/22/19 1436      Row Name 02/22/19 1345             Therapeutic Exercise    Therapeutic Exercise  seated, lower extremities  -MIRZA      Additional Documentation  Therapeutic Exercise (Row)  -MIRZA      38583 - PT Therapeutic Activity Minutes  23  -MIRZA      Recorded by [MIRZA] Waleska Perez, PT 02/22/19 1436      Row Name 02/22/19 1345             Lower Extremity Seated Therapeutic Exercise    Performed, Seated Lower Extremity  (Therapeutic Exercise)  hip flexion/extension;knee flexion/extension;ankle dorsiflexion/plantarflexion  -MIRZA      Exercise Type, Seated Lower Extremity (Therapeutic Exercise)  AROM (active range of motion)  -MIRZA      Sets/Reps Detail, Seated Lower Extremity (Therapeutic Exercise)  1/10  -MIRZA      Recorded by [MIRZA] Waleska Perez, PT 02/22/19 1436      Row Name 02/22/19 1345             Static Sitting Balance    Level of [place] (Unsupported Sitting, Static Balance)  independent  -MIRZA      Sitting Position (Unsupported Sitting, Static Balance)  sitting on edge of bed  -MIRZA      Time Able to Maintain Position (Unsupported Sitting, Static Balance)  more than 5 minutes  -MIRZA      Recorded by [MIRZA] Waleska Perez, PT 02/22/19 1436      Row Name 02/22/19 1345             Dynamic Sitting Balance    Level of [place], Reaches Outside Midline (Sitting, Dynamic Balance)  independent  -MIRZA      Sitting Position, Reaches Outside Midline (Sitting, Dynamic Balance)  sitting on edge of bed  -MIRZA      Recorded by [MIRZA] Waleska Perez, PT 02/22/19 1436      Row Name 02/22/19 1345             Static Standing Balance    Level of [place] (Supported Standing, Static Balance)  standby assist  -MIRZA      Assistive Device Utilized (Supported Standing, Static Balance)  walker, rolling  -MIRZA      Recorded by [MIRZA] Waleska Perez, PT 02/22/19 1436      Row Name 02/22/19 1345             Dynamic Standing Balance    Level of [place], Reaches Outside Midline (Standing, Dynamic Balance)  standby assist  -MIRZA      Time Able to Maintain Position, Reaches Outside Midline (Standing, Dynamic Balance)  2 to 3 minutes  -MIRZA      Assistive Device Utilized (Supported Standing, Dynamic Balance)  walker, rolling  -MIRZA      Recorded by [MIRZA] Waleska Perez, PT 02/22/19 1436      Row Name 02/22/19 1345             Positioning and Restraints    Pre-Treatment Position  bathroom  -MIRZA      Post Treatment Position  bed  -MIRZA      In Bed   notified nsg;supine;call light within reach;exit alarm on  -MIRZA      Recorded by [MIRZA] Waleska Perez, PT 02/22/19 1436      Row Name 02/22/19 1345             Pain Scale: Numbers Pre/Post-Treatment    Pain Scale: Numbers, Pretreatment  0/10 - no pain  -MIRZA      Pain Scale: Numbers, Post-Treatment  0/10 - no pain  -MIRZA      Recorded by [MIRZA] Waleska Perez, PT 02/22/19 1436      Row Name 02/22/19 1345             Outcome Summary/Treatment Plan (PT)    Daily Summary of Progress (PT)  progress towards functional goals is fair  -MIRZA      Anticipated Equipment Needs at Discharge (PT)  front wheeled walker  -MIRZA      Anticipated Discharge Disposition (PT)  skilled nursing facility  -MIRZA      Recorded by [MIRZA] Waleska Perez, PT 02/22/19 1436        User Key  (r) = Recorded By, (t) = Taken By, (c) = Cosigned By    Initials Name Effective Dates Discipline    MIRZA Waleska Perez, PT 06/19/15 -  PT               Rehab Goal Summary     Row Name 02/22/19 1030             Occupational Therapy Goals    Transfer Goal Selection (OT)  transfer, OT goal 1  -KF      Bathing Goal Selection (OT)  bathing, OT goal 1  -KF      Dressing Goal Selection (OT)  dressing, OT goal 1  -KF      Safety Awareness Goal Selection (OT)  safety awareness, OT goal 1  -KF         Transfer Goal 1 (OT)    Activity/Assistive Device (Transfer Goal 1, OT)  bed-to-chair/chair-to-bed;walker, rolling;commode  -KF      [place] Level/Cues Needed (Transfer Goal 1, OT)  supervision required;verbal cues required  -KF      Time Frame (Transfer Goal 1, OT)  long term goal (LTG);by discharge  -KF      Progress/Outcome (Transfer Goal 1, OT)  goal ongoing  -KF         Bathing Goal 1 (OT)    Activity/Assistive Device (Bathing Goal 1, OT)  lower body bathing;long-handled sponge AE prn  -KF      [place] Level/Cues Needed (Bathing Goal 1, OT)  conditional independence  -KF      Time Frame (Bathing Goal 1, OT)  long term goal (LTG);by discharge  -KF       Progress/Outcomes (Bathing Goal 1, OT)  goal ongoing  -KF         Dressing Goal 1 (OT)    Activity/Assistive Device (Dressing Goal 1, OT)  lower body dressing undergarment reacher prn  -KF      [place]/Cues Needed (Dressing Goal 1, OT)  supervision required  -KF      Time Frame (Dressing Goal 1, OT)  long term goal (LTG);by discharge  -KF      Progress/Outcome (Dressing Goal 1, OT)  goal ongoing  -KF         Safety Awareness Goal 1 (OT)    Activity (Safety Awareness Goal 1, OT)  safe use of assistive device/equipment;demonstration of safe behaviors  -KF      [place]/Cues/Accuracy (Safety Awareness Goal 1, OT)  with minimum;verbal cues/redirection  -KF      Time Frame (Safety Awareness Goal 1, OT)  long term goal (LTG);by discharge  -KF      Progress/Outcome (Safety Awareness Goal 1, OT)  goal ongoing  -KF        User Key  (r) = Recorded By, (t) = Taken By, (c) = Cosigned By    Initials Name Provider Type Discipline    KF Maricel Talamantes, OT Occupational Therapist OT          Physical Therapy Education     Title: PT OT SLP Therapies (In Progress)     Topic: Physical Therapy (In Progress)     Point: Mobility training (In Progress)     Learning Progress Summary           Patient Acceptance, E, NR by MIRZA at 2/22/2019  1:45 PM    SAGAR Araya VU, DU,NR by SC at 2/21/2019  2:25 PM    Comment:  reviewed safety with mobility                   Point: Home exercise program (In Progress)     Learning Progress Summary           Patient Acceptance, E, NR by MIRZA at 2/22/2019  1:45 PM    SAGAR Araya VU, DU,NR by SC at 2/21/2019  2:25 PM    Comment:  reviewed safety with mobility                   Point: Body mechanics (In Progress)     Learning Progress Summary           Patient Acceptance, E, NR by MIRZA at 2/22/2019  1:45 PM    Ranulfoer, SAGAR, HUNTER,DU,NR by SC at 2/21/2019  2:25 PM    Comment:  reviewed safety with mobility                   Point: Precautions (In Progress)     Learning Progress Summary           Patient Acceptance, E,  NR by MIRZA at 2/22/2019  1:45 PM    SAGAR Araya VU, DU,NR by SC at 2/21/2019  2:25 PM    Comment:  reviewed safety with mobility                               User Key     Initials Effective Dates Name Provider Type Discipline    SC 06/19/15 -  Neema Oliva, PT Physical Therapist PT    MIRZA 06/19/15 -  Waleska Perez, PT Physical Therapist PT                PT Recommendation and Plan  Anticipated Discharge Disposition (PT): skilled nursing facility  Therapy Frequency (PT Clinical Impression): daily  Outcome Summary/Treatment Plan (PT)  Daily Summary of Progress (PT): progress towards functional goals is fair  Anticipated Equipment Needs at Discharge (PT): front wheeled walker  Anticipated Discharge Disposition (PT): skilled nursing facility  Outcome Summary: patient is able to ambulate 40 ft with walker with stand by assist. she fatigues easily   Outcome Measures     Row Name 02/22/19 1345 02/22/19 1030 02/21/19 1425       How much help from another person do you currently need...    Turning from your back to your side while in flat bed without using bedrails?  4  -MIRZA  --  4  -SC    Moving from lying on back to sitting on the side of a flat bed without bedrails?  4  -MIRZA  --  3  -SC    Moving to and from a bed to a chair (including a wheelchair)?  3  -MIRZA  --  3  -SC    Standing up from a chair using your arms (e.g., wheelchair, bedside chair)?  3  -MIRZA  --  3  -SC    Climbing 3-5 steps with a railing?  2  -MIRZA  --  2  -SC    To walk in hospital room?  3  -MIRZA  --  3  -SC    AM-PAC 6 Clicks Score  19  -MIRZA  --  18  -SC       How much help from another is currently needed...    Putting on and taking off regular lower body clothing?  --  3  -KF  --    Bathing (including washing, rinsing, and drying)  --  3  -KF  --    Toileting (which includes using toilet bed pan or urinal)  --  3  -KF  --    Putting on and taking off regular upper body clothing  --  4  -KF  --    Taking care of personal grooming (such as brushing teeth)   --  4  -KF  --    Eating meals  --  4  -KF  --    Score  --  21  -KF  --       Functional Assessment    Outcome Measure Options  --  AM-PAC 6 Clicks Daily Activity (OT)  -KF  AM-PAC 6 Clicks Basic Mobility (PT)  -SC      User Key  (r) = Recorded By, (t) = Taken By, (c) = Cosigned By    Initials Name Provider Type    SC Neema Oliva, PT Physical Therapist    Waleska Vail, PT Physical Therapist    Maricel Adams, OT Occupational Therapist         Time Calculation:   PT Charges     Row Name 02/22/19 1345             Time Calculation    Start Time  1345  -MIRZA      PT Received On  02/22/19  -MIRZA      PT Goal Re-Cert Due Date  03/03/19  -MIRZA         Time Calculation- PT    Total Timed Code Minutes- PT  23 minute(s)  -MIRZA         Timed Charges    94015 - PT Therapeutic Activity Minutes  23  -MIRZA        User Key  (r) = Recorded By, (t) = Taken By, (c) = Cosigned By    Initials Name Provider Type    Waleska Vail, PT Physical Therapist        Therapy Suggested Charges     Code   Minutes Charges    73238 (CPT®) Hc Pt Neuromusc Re Education Ea 15 Min      52256 (CPT®) Hc Pt Ther Proc Ea 15 Min      48519 (CPT®) Hc Gait Training Ea 15 Min      83592 (CPT®) Hc Pt Therapeutic Act Ea 15 Min 23 2    24357 (CPT®) Hc Pt Manual Therapy Ea 15 Min      17565 (CPT®) Hc Pt Iontophoresis Ea 15 Min      68161 (CPT®) Hc Pt Elec Stim Ea-Per 15 Min      77566 (CPT®) Hc Pt Ultrasound Ea 15 Min      39636 (CPT®) Hc Pt Self Care/Mgmt/Train Ea 15 Min      91873 (CPT®) Hc Pt Prosthetic (S) Train Initial Encounter, Each 15 Min      23592 (CPT®) Hc Pt Orthotic(S)/Prosthetic(S) Encounter, Each 15 Min      34761 (CPT®) Hc Orthotic(S) Mgmt/Train Initial Encounter, Each 15min      Total  23 2        Therapy Charges for Today     Code Description Service Date Service Provider Modifiers Qty    02610692976 HC PT THERAPEUTIC ACT EA 15 MIN 2/22/2019 Waleska Perez, PT GP 2          PT G-Codes  Outcome Measure Options: AM-PAC 6 Clicks  Daily Activity (OT)  AM-PAC 6 Clicks Score: 19  Score: 21    Waleska Perez, PT  2/22/2019

## 2019-02-22 NOTE — PROGRESS NOTES
Continued Stay Note  [facility]     Patient Name: Evelyn Rogers  MRN: [id number]  Today's Date: 2/22/2019    Admit Date: 2/20/2019    Discharge Plan     Row Name 02/22/19 1557       Plan    Plan  Home with [facility]    Patient/Family in Agreement with Plan  yes    Plan Comments  Spoke with pt at bedside. PT had recommended SNF for pt. Pt declines and reports she wants to discharge home with [facility] and is also agreeable to a walker. Pt had no preference of a medical equipment company for the walker. EUGENIO called walker order to [place] with [employer] and she delivered it to pt at bedside. EUGENIO also contacted [facility] and verified pt is current. EUGENIO let [facility] know she is faxing home health orders to them but will call when pt discharges home. EUGENIO faxed home health orders to [place]. Pt been set up with [facility] and a rolling walker. Pt also reports she wants to keep her Primary care physician, Dr. Thorpe and she has an appointment with him on March 8th.    Final Discharge Disposition Code  06 - home with home health care        Discharge Codes    No documentation.             BECKY Maldonado

## 2019-02-22 NOTE — PROGRESS NOTES
Case Management Discharge Note    Final Note: EUGENIO faxed discharge summary to [facility]. EUGENIO called [facility]  to verify they received the order. Pt set up with rolling walker and [facility]. [facility] has pt back on the schedule for this weekend.     Destination      No service has been selected for the patient.      Durable Medical Equipment      Service Provider Request Status Selected Services Address Phone Number Fax Number    [facility] Selected Durable Medical Equipment [address] [place] [address] [address] [phone]      Dialysis/Infusion      No service has been selected for the patient.      Home Medical Care - Selection Complete      Service Provider Request Status Selected Services Address Phone Number Fax Number    [facility] Selected Home Health Services [address], [facility] [address] [phone]      Community Resources      No service has been selected for the patient.             Final Discharge Disposition Code: 06 - home with home health care

## 2019-02-22 NOTE — PROGRESS NOTES
"Pharmacy Consult  -  Warfarin  Evelyn Rogers is a  78 y.o. female   Height - 167.6 cm (66\")  Weight - 88.5 kg (195 lb)    Consulting Provider: Cardiology  Indication: Atrial Fibrillation  Goal INR: 2-3  Home Regimen:   - Warfarin 5 mg daily     Bridge Therapy: No     Drug-Drug Interactions with current regimen:   Doxycycline - increased bleed risk              Amiodarone - increased bleed risk (home medication)    Warfarin Dosing During Admission:    Date  2/20 2/21 2/22         INR  1.81 1.71 1.5         Dose  5 mg HOLD for procedure  (5 mg)            Education Provided: Patient is on warfarin prior to admission.  Education provided 2/22/19 verbally and in writing.  Discussed effects of warfarin, importance of checking INR, drug-drug and drug-food interactions, and signs/symptoms of bleeding and clotting.  Patient verbalized understanding through teach back.  All pertinent questions were answered.      Discharge Follow up:       Following Provider -        Follow up time range or appointment - Recommend repeat INR within 3 days of discharge    Labs:  Results from last 7 days   Lab Units 02/22/19  0721 02/21/19  0644 02/21/19  0031 02/20/19  1753 02/20/19  1653   INR  1.50* 1.71* 1.67* 1.81*  --    HEMOGLOBIN g/dL  --   --   --   --  13.4   HEMATOCRIT %  --   --   --   --  41.0   PLATELETS 10*3/mm3  --   --   --   --  121*     Results from last 7 days   Lab Units 02/22/19  0721 02/21/19 2021 02/21/19  0644 02/21/19  0027 02/20/19  1653   SODIUM mmol/L 138  --  137 135 137   POTASSIUM mmol/L 3.7 4.1 3.3* 3.4* 3.8   CHLORIDE mmol/L 105  --  104 103 106   CO2 mmol/L 24.0  --  27.0 26.0 26.0   BUN mg/dL 24*  --  16 14 14   CREATININE mg/dL 1.00  --  0.77 0.81 0.82   CALCIUM mg/dL 9.8  --  9.3 9.3 10.0   BILIRUBIN mg/dL  --   --   --   --  2.0*   ALK PHOS U/L  --   --   --   --  89   ALT (SGPT) U/L  --   --   --   --  19   AST (SGOT) U/L  --   --   --   --  18   GLUCOSE mg/dL 92  --  105* 122* 108*     Current " dietary intake: No PO intake documented as patient has been NPO for procedures    Diet Order   Procedures   • Diet Regular; Cardiac     Assessment/Plan:     Patient's INR is subtherapeutic at 1.5 today. Of note, warfarin was held last night for procedure today.  Restarting warfarin today, will resume home dose of warfarin 5 mg.  Daily PT/INR ordered.  Monitor signs/symptoms of bleeding, dietary intake, and drug-drug interactions. Make dose adjustments as necessary.  Pharmacy will continue to follow.    Ivon Pascual, PharmD  2/22/2019  2:41 PM

## 2019-02-22 NOTE — DISCHARGE SUMMARY
[facility] Medicine Services  DISCHARGE SUMMARY    Patient Name: Evelyn Rogers  : 1940  MRN: [id number]    Date of Admission: 2019  Date of Discharge:  19  Primary Care Physician: Provider, No Known   Consultation: Cardiology     Consults     Date and Time Order Name Status Description    2019 0957 Inpatient Cardiology Consult Completed           Hospital Course     Presenting Problem:   CAP (community acquired pneumonia) [J18.9]  Acute on chronic congestive heart failure, unspecified heart failure type (CMS/HCC) [I50.9]    Active Hospital Problems    Diagnosis Date Noted   • **CAP (community acquired pneumonia) [J18.9] 2019   • Acute on chronic congestive heart failure (CMS/HCC) [I50.9] 2019   • A-fib (CMS/HCC) [I48.91] 2019   • Pleural effusion, bilateral [J90] 2019   • Pericardial effusion [I31.3] 2019   • CAD (coronary artery disease) [I25.10] 2019   • Essential hypertension [I10] 2019   • Liver nodule [K76.89] 2019   • Bacteriuria [R82.71] 2019      Resolved Hospital Problems    Diagnosis Date Noted Date Resolved   • Hypoxia [R09.02] 2019          Hospital Course:  Evelyn Rogers is a 78 y.o. female with history of afib, pelural effusions, anxiety, lupus who presented with fatigue, congestion, cough. Imaging showed small pericardial effusion, trace bilateral pleural effusions and RLL infiltrate as well as 6.5 cm left hepatic nodule. Patient was started on azithromycin and rocephin and given intermittent IV diuresis with lasix. Blood cultures and flu were negative. She progressed off oxygen and currently has been on room air. She will be discharged on ceftin and doxycyline to complete 7 days of antibiotics.     Cardiology was consulted. ECHO with grade II diastolic dysfunction and EF 60-70%. Troponin was mildly elevated. She had a stress test  which was negative for ischemia. Atorvastatin  20 mg daily was started. She was continued on amiodarone for atrial fib and coumadin was restarted. She will continue on home dosing of coumadin and PCP and home health follow up. She received intermittent IV diuresis and will resume home diuretics. She will follow up with cardiology in 4-6 weeks.     Bacturia noted on UA. Asymptomatic and just completed treatment for UTI.     PT/OT eval and SNF recommended at discharge, however, patient declined and will be discharged with home health.      Discharge Follow Up Recommendations for labs/diagnostics:  PCP as scheduled 3/8/19  Cardiology Dr. Lee 4-6 weeks     Day of Discharge     HPI:   No acute events. Stress test this morning. States breathing is improved but still having persistent cough. Declines rehab after discharge and would like to be discharged with home health. Has PCP follow up 3/8. Feels comfortable and ready for discharge.     Review of Systems  Gen- No fevers, chills  CV- No chest pain, palpitations  Resp- + cough, no dyspnea  GI- No N/V/D, abd pain      Otherwise ROS is negative except as mentioned in the HPI.    Vital Signs:   Temp:  [97.6 °F (36.4 °C)-98.8 °F (37.1 °C)] 98.3 °F (36.8 °C)  Heart Rate:  [51-65] 61  Resp:  [16-18] 16  BP: ()/(41-63) 103/54     Physical Exam:  Constitutional: No acute distress, awake, alert; sitting up in the chair   HENT: NCAT, mucous membranes moist  Respiratory: Clear to auscultation bilaterally, respiratory effort normal; occ rhonchi   Cardiovascular: RRR, no murmurs, rubs, or gallops, palpable pedal pulses bilaterally  Gastrointestinal: Positive bowel sounds, soft, nontender, nondistended  Musculoskeletal: No bilateral ankle edema  Psychiatric: Appropriate affect, cooperative  Neurologic: Oriented x 3, strength symmetric in all extremities, Cranial Nerves grossly intact to confrontation, speech clear  Skin: No rashes      Pertinent  and/or Most Recent Results     Results from last 7 days   Lab Units  02/22/19  0721 02/21/19 2021 02/21/19  0644 02/21/19  0027 02/20/19  1653   WBC 10*3/mm3  --   --   --   --  4.89   HEMOGLOBIN g/dL  --   --   --   --  13.4   HEMATOCRIT %  --   --   --   --  41.0   PLATELETS 10*3/mm3  --   --   --   --  121*   SODIUM mmol/L 138  --  137 135 137   POTASSIUM mmol/L 3.7 4.1 3.3* 3.4* 3.8   CHLORIDE mmol/L 105  --  104 103 106   CO2 mmol/L 24.0  --  27.0 26.0 26.0   BUN mg/dL 24*  --  16 14 14   CREATININE mg/dL 1.00  --  0.77 0.81 0.82   GLUCOSE mg/dL 92  --  105* 122* 108*   CALCIUM mg/dL 9.8  --  9.3 9.3 10.0     Results from last 7 days   Lab Units 02/22/19  0721 02/21/19  0644 02/21/19  0031 02/20/19  1753 02/20/19  1653   BILIRUBIN mg/dL  --   --   --   --  2.0*   ALK PHOS U/L  --   --   --   --  89   ALT (SGPT) U/L  --   --   --   --  19   AST (SGOT) U/L  --   --   --   --  18   PROTIME Seconds 17.5* 19.3* 18.9* 20.2*  --    INR  1.50* 1.71* 1.67* 1.81*  --            Invalid input(s): TG, LDLCALC, LDLREALC  Results from last 7 days   Lab Units 02/21/19  1115 02/21/19  0645 02/21/19  0644 02/21/19  0027 02/20/19  1855 02/20/19  1654   HEMOGLOBIN A1C %  --  4.80  --   --   --   --    BNP pg/mL  --   --   --   --  546.0*  --    TROPONIN I ng/mL 0.062*  --  0.100* 0.084*  --  0.01       Brief Urine Lab Results  (Last result in the past 365 days)      Color   Clarity   Blood   Leuk Est   Nitrite   Protein   CREAT   Urine HCG        02/20/19 1744 Yellow Clear Trace Negative Negative Negative               Microbiology Results Abnormal     Procedure Component Value - Date/Time    Blood Culture - Blood, Arm, Right [[id number]] Collected:  02/20/19 1800    Lab Status:  Preliminary result Specimen:  Blood from Arm, Right Updated:  02/21/19 1830     Blood Culture No growth at 24 hours    Blood Culture - Blood, Arm, Left [[id number]] Collected:  02/20/19 1805    Lab Status:  Preliminary result Specimen:  Blood from Arm, Left Updated:  02/21/19 1830     Blood Culture No growth at 24 hours     Influenza A & B, RT PCR - Swab, Nasopharynx [[id number]]  (Normal) Collected:  02/21/19 1326    Lab Status:  Final result Specimen:  Swab from Nasopharynx Updated:  02/21/19 1522     Influenza A PCR Not Detected     Influenza B PCR Not Detected    Influenza Antigen, Rapid - Swab, Nasopharynx [[id number]]  (Normal) Collected:  02/20/19 1747    Lab Status:  Final result Specimen:  Swab from Nasopharynx Updated:  02/20/19 1842     Influenza A Ag, EIA Negative     Influenza B Ag, EIA Negative          Imaging Results (all)     Procedure Component Value Units Date/Time    CT Chest Without Contrast [[id number]] Collected:  02/20/19 1945     Updated:  02/20/19 2039    Narrative:       EXAM:    CT Chest Without Contrast     EXAM DATE/TIME:    2/20/2019 7:45 PM     CLINICAL HISTORY:    78 years old, female; Signs and symptoms; Cough and shortness of breath;   Additional info: Cough, SOB, hypoxia, concern for pneumonia     TECHNIQUE:    Axial computed tomography images of the chest without intravenous contrast.    All CT scans at this facility use at least one of these dose optimization   techniques: automated exposure control; mA and/or kV adjustment per patient   size (includes targeted exams where dose is matched to clinical indication); or   iterative reconstruction.    Coronal reformatted images were created and reviewed.     COMPARISON:    CR XR CHEST 1 VW 2/20/2019 4:21 PM     FINDINGS:    Limitations:  Motion artifact does somewhat limit the sensitivity of this   examination.    Lungs:  Mild patchy RLL infiltrate. Clinically correlates to rule out   pneumonia. Minimal scattered subsegmental atelectasis in bilateral lungs in   multilobar distribution pattern.    Pleural space:  Trace bilateral pleural effusions.  No pneumothorax.   Heart:  There are coronary artery atheromatous calcifications, consistent with   CAD. Mild cardiomegaly. Small pericardial effusion. Some of the pericardial   effusion is within pericardial  recesses.    Mediastinum:  Air in distal esophagus versus minimal hiatal hernia.    Aorta:  Aortic atherosclerosis. No aortic aneurysm.    Lymph nodes:  No gross lymphadenopathy on noncontrast exam.    Bones/joints:  Mild scoliosis. Degenerative changes of the spine.    Soft tissues: Unremarkable.    Liver:  Round hypodense 6.5 cm left hepatic nodule, probably a cyst. US could   characterize.    Gallbladder and bile ducts:  Cholecystectomy.    Spleen:  Unremarkable. No splenomegaly or mass.    Adrenals:  Unremarkable adrenals.    Kidneys and ureters:  Visualized upper kidneys are unremarkable.       Impression:       1. Mild cardiomegaly. CAD. Small pericardial effusion.   2. Trace bilateral pleural effusions.   3. Left hepatic 6.5 cm nodule, probably cyst.   4. Mild patchy RLL infiltrate. Clinically correlates to rule out pneumonia.   5. Minimal bilateral scattered subsegmental atelectasis.   6. Additional findings, as above.     THIS DOCUMENT HAS BEEN ELECTRONICALLY SIGNED BY RULA FAITH MD    XR Chest 1 View [[id number]] Collected:  02/20/19 1713     Updated:  02/20/19 1734    Narrative:       EXAMINATION: XR CHEST 1 VW - 2/20/2019     INDICATION: Weakness, dizziness, altered mental status.     TECHNIQUE: Single frontal view of the chest.      COMPARISONS: None.     FINDINGS:  Cardiomegaly. No focal airspace disease or pneumothorax.  Possible trace left effusion and trace edema. Atherosclerosis.       Impression:       Cardiomegaly. Favor trace edema and left effusion.     DICTATED:   2/20/2019  EDITED/ls :   2/20/2019      This report was finalized on 2/20/2019 5:32 PM by Galo Bautista.                       Results for orders placed during the hospital encounter of 02/20/19   Adult Transthoracic Echo Complete With Contrast if Necessary Per Protocol    Narrative · Left ventricular systolic function is normal.  · Estimated EF appears to be in the range of 66 - 70%.  · Left ventricular diastolic dysfunction  (grade II) consistent with   pseudonormalization.  · Moderate tricuspid valve regurgitation is present.  · Left atrial cavity size is severely dilated.  · Calculated right ventricular systolic pressure from tricuspid   regurgitation is 55 mmHg.           Order Current Status    Blood Culture - Blood, Arm, Left Preliminary result    Blood Culture - Blood, Arm, Right Preliminary result        Discharge Details        Discharge Medications      New Medications      Instructions Start Date   atorvastatin 20 MG tablet  Commonly known as:  LIPITOR   20 mg, Oral, Nightly      cefuroxime 500 MG tablet  Commonly known as:  CEFTIN   500 mg, Oral, 2 Times Daily      doxycycline 100 MG capsule  Commonly known as:  MONODOX   100 mg, Oral, 2 Times Daily         Continue These Medications      Instructions Start Date   ALPRAZolam 0.5 MG tablet  Commonly known as:  XANAX   0.5 mg, Oral, Daily PRN      amiodarone 200 MG tablet  Commonly known as:  PACERONE   200 mg, Oral, Daily      baclofen 10 MG tablet  Commonly known as:  LIORESAL   10 mg, Oral, 3 Times Daily      diphenhydrAMINE-acetaminophen  MG tablet per tablet  Commonly known as:  TYLENOL PM   1 tablet, Oral, Nightly      furosemide 40 MG tablet  Commonly known as:  LASIX   40 mg, Oral, 3 Times Weekly, Monday Wednesday Friday       losartan 50 MG tablet  Commonly known as:  COZAAR   50 mg, Oral, Daily      nystatin 359222 UNIT/GM cream  Commonly known as:  MYCOSTATIN   Topical, 2 Times Daily      sertraline 50 MG tablet  Commonly known as:  ZOLOFT   50 mg, Oral, Daily      warfarin 5 MG tablet  Commonly known as:  COUMADIN   5 mg, Oral, Daily Warfarin             Allergies   Allergen Reactions   • Other Unknown (See Comments)     SOMETHING THAT SHE GOT DURING AN MRI         Discharge Disposition:  Home-Health Care Svc    Discharge Diet:  Diet Order   Procedures   • Diet Regular; Cardiac         Discharge Activity:   Activity Instructions     Activity as Tolerated               CODE STATUS:    Code Status and Medical Interventions:   Ordered at: 02/20/19 2330     Code Status:    CPR     Medical Interventions (Level of Support Prior to Arrest):    Full         No future appointments.    Additional Instructions for the Follow-ups that You Need to Schedule     Ambulatory Referral to Home Health   As directed      Face to Face Visit Date:  2/22/2019    Follow-up Provider for Plan of Care?:  I treated the patient in an acute care facility and will not continue treatment after discharge.    Follow-up Provider:  CORNELIO RENAE [[id number]]    Reason/Clinical Findings:  CAP    Describe mobility limitations that make leaving home difficult:  impaired functional mobility, balance, gait and endurance    Nursing/Therapeutic Services Requested:  Physical Therapy Occupational Therapy    PT orders:  Therapeutic exercise Gait Training Strengthening Home safety assessment    Weight Bearing Status:  As Tolerated    Occupational orders:  Activities of daily living Energy conservation Strengthening Home safety assessment         Discharge Follow-up with PCP   As directed       Currently Documented PCP:    Provider, No Known    PCP Phone Number:    None     Follow Up Details:  PCP as scheduled 3/8/19         Discharge Follow-up with Specified Provider: Cardiology Dr. Lee 4-6 weeks   As directed      To:  Cardiology Dr. Lee 4-6 weeks               Time Spent on Discharge:  35 minutes    Electronically signed by Angelina Harris DO 02/22/19, 4:11 PM.

## 2019-02-22 NOTE — DISCHARGE PLACEMENT REQUEST
"Evelyn Philip (78 y.o. Female)     Date of Birth Social Security Number Address Home Phone MRN    1940  [address]  [place] [address] [phone] [id number]    Holiness Marital Status          Mandaen        Admission Date Admission Type Admitting Provider Attending Provider Department, Room/Bed    19 Emergency Angelina Harris DO Roesch, Lyndsey, DO [facility] 2G, S230/1    Discharge Date Discharge Disposition Discharge Destination                       Attending Provider:  Angelina Harris DO    Allergies:  Other    Isolation:  None   Infection:  None   Code Status:  CPR    Ht:  167.6 cm (66\")   Wt:  88.5 kg (195 lb)    Admission Cmt:  None   Principal Problem:  CAP (community acquired pneumonia) [J18.9]                 Active Insurance as of 2019     Primary Coverage     Payor Plan Insurance Group Employer/Plan Group    MEDICARE MEDICARE A & B      Payor Plan Address Payor Plan Phone Number Payor Plan Fax Number Effective Dates    [address] [phone]  2006 - None Entered    [facility] [address]       Subscriber Name Subscriber Birth Date Member ID       EVELYN PHILIP 1940 [id number]           Secondary Coverage     Payor Plan Insurance Group Employer/Plan Group    ANTHEM BLUE CROSS ANTHEM BLUE CROSS BLUE SHIELD PPO [id number]     Payor Plan Address Payor Plan Phone Number Payor Plan Fax Number Effective Dates    [address] [phone]  2017 - None Entered    [facility] [address]       Subscriber Name Subscriber Birth Date Member ID       EVELYN PHILIP 1940 [id number]                 Emergency Contacts      (Rel.) Home Phone Work Phone Mobile Phone    AIMEE PHILIP (Son) -- -- [phone]               History & Physical      DayReva MD at 2019 10:29 PM              [facility] Medicine Services  HISTORY AND PHYSICAL    Patient Name: Evelyn Philip  : 1940  MRN: " [id number]  Primary Care Physician: Provider, No Known  Date of admission: 2/20/2019      Subjective   Subjective     Chief Complaint:  Fatigue    HPI:  Evelyn Rogers is a 78 y.o. 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  She states that she is experiencing generalized weakness and has had to hold on to the wall when walking.  Patient was treated for a UTI 3 weeks ago by her PCP.  Two weeks ago she developed congestion and a productive cough with white/yellow sputum that significantly worsened last night.  This weeks she is experiencing chills and an elevated temperature yesterday.  She also reports having shortness of air, headaches, nausea, right flank pain, dysuria, intermittent diarrhea and constipation.  She denies chest pain, vomiting, abdominal pain, melena, or any other complaints at this time.  CT of the chest shows a small pericardial effusion, trace bilateral pleural effusions, left hepatic 6.5 cm nodule, and patchy RLL infiltrate.  Patient was given Lasix 20 mg IV x 1 dose, and started on rocephin and azithromycin in the ED.  Patient is being admitted to the Hospitalist for further evaluation and management.      77 YO F RECENTLY TREATED FOR UTI BUT STILL NOT FEELING WELL AND HAS HAD COUGH PRODUCTIVE OF PINK SPUTUM, CONGESTION, SHORTNESS OF BREATH.  STILL HAVING PAINFUL URINATION.  NO F/C.  NO SIGNIFICANT LEG SWELLING AND STATES LLE CHRONICALLY SWOLLEN S/P OSTEOSARCOMA REMOVAL 60 YRS AGO.  HAS HAD CHILLS AND ?FEVER.      Review of Systems   Constitutional: Positive for activity change, appetite change, chills, fatigue and fever. Negative for diaphoresis.   HENT: Positive for congestion.    Eyes: Negative.    Respiratory: Positive for cough and shortness of breath. Negative for wheezing.    Cardiovascular: Positive for  leg swelling (chronic). Negative for chest pain and palpitations.   Gastrointestinal: Positive for constipation, diarrhea, nausea and vomiting. Negative for abdominal distention and abdominal pain.   Endocrine: Negative.    Genitourinary: Positive for dysuria. Negative for hematuria.   Musculoskeletal: Negative.    Skin: Negative.    Allergic/Immunologic: Negative.    Neurological: Negative.    Hematological: Negative.    Psychiatric/Behavioral: Negative.         Otherwise complete ROS reviewed and is negative except as mentioned in the HPI.    Personal History     Past Medical History:   Diagnosis Date   • CAD (coronary artery disease)    • CHF (congestive heart failure) (CMS/[facility])    • Hypertension        No past surgical history on file.    Family History: family history is not on file. Otherwise pertinent FHx was reviewed and unremarkable.     Social History:  reports that  has never smoked. she has never used smokeless tobacco. She reports that she does not drink alcohol or use drugs.  Social History     Social History Narrative   • Not on file       Medications:    Available home medication information reviewed.  Medications Prior to Admission   Medication Sig Dispense Refill Last Dose   • ALPRAZolam (XANAX) 0.5 MG tablet Take 0.5 mg by mouth Daily As Needed for Anxiety.   2/19/2019 at Unknown time   • amiodarone (PACERONE) 200 MG tablet Take 200 mg by mouth Daily.   2/20/2019 at Unknown time   • baclofen (LIORESAL) 10 MG tablet Take 10 mg by mouth 3 (Three) Times a Day.   2/20/2019 at 0900   • diphenhydrAMINE-acetaminophen (TYLENOL PM)  MG tablet per tablet Take 1 tablet by mouth Every Night.   2/19/2019 at 2100   • furosemide (LASIX) 40 MG tablet Take 40 mg by mouth 3 (Three) Times a Week. Monday Wednesday Friday 2/20/2019 at Unknown time   • losartan (COZAAR) 50 MG tablet Take 50 mg by mouth Daily.   2/20/2019 at Unknown time   • warfarin (COUMADIN) 5 MG tablet Take 5 mg by mouth Daily.   2/20/2019  at Unknown time   • nystatin (MYCOSTATIN) 541189 UNIT/GM cream Apply  topically to the appropriate area as directed 2 (Two) Times a Day.   Unknown at Unknown time       Allergies   Allergen Reactions   • Other Unknown (See Comments)     SOMETHING THAT SHE GOT DURING AN MRI       Objective   Objective     Vital Signs:   Temp:  [99.8 °F (37.7 °C)-100.4 °F (38 °C)] 99.8 °F (37.7 °C)  Heart Rate:  [67-77] 71  Resp:  [18-20] 18  BP: (125-166)/() 166/72        Physical Exam   Constitutional: She is oriented to person, place, and time. She appears well-developed. No distress.   HENT:   Head: Normocephalic.   Eyes: Pupils are equal, round, and reactive to light.   Neck: Normal range of motion. Neck supple.   Cardiovascular: Normal rate, regular rhythm, normal heart sounds and intact distal pulses. Exam reveals no gallop.   No murmur heard.  Pulmonary/Chest: Effort normal and breath sounds normal. No stridor. No respiratory distress. Rales: RLL.   Abdominal: Soft. Bowel sounds are normal. She exhibits no distension and no mass. There is no tenderness. There is no rebound and no guarding.   Musculoskeletal: Normal range of motion. She exhibits edema (mild BLE L>R (chronic)). She exhibits no tenderness or deformity.   Neurological: She is alert and oriented to person, place, and time. No cranial nerve deficit.   Skin: Skin is warm and dry. No rash noted. She is not diaphoretic. No erythema. No pallor.   Psychiatric: She has a normal mood and affect. Her behavior is normal. Judgment and thought content normal.      PRESENT AND PARTICIPATED IN ABOVE EXAM W/ NO CHANGES TO ABOVE.      Results Reviewed:  I have personally reviewed current lab, radiology, and data and agree.    Results from last 7 days   Lab Units 02/20/19  1753 02/20/19  1653   WBC 10*3/mm3  --  4.89   HEMOGLOBIN g/dL  --  13.4   HEMATOCRIT %  --  41.0   PLATELETS 10*3/mm3  --  121*   INR  1.81*  --      Results from last 7 days   Lab Units 02/20/19  1654  02/20/19  1653   SODIUM mmol/L  --  137   POTASSIUM mmol/L  --  3.8   CHLORIDE mmol/L  --  106   CO2 mmol/L  --  26.0   BUN mg/dL  --  14   CREATININE mg/dL  --  0.82   GLUCOSE mg/dL  --  108*   CALCIUM mg/dL  --  10.0   ALT (SGPT) U/L  --  19   AST (SGOT) U/L  --  18   TROPONIN I ng/mL 0.01  --      Estimated Creatinine Clearance: 62.9 mL/min (by C-G formula based on SCr of 0.82 mg/dL).  Brief Urine Lab Results  (Last result in the past 365 days)      Color   Clarity   Blood   Leuk Est   Nitrite   Protein   CREAT   Urine HCG        02/20/19 1744 Yellow Clear Trace Negative Negative Negative             BNP   Date Value Ref Range Status   02/20/2019 546.0 (H) 0.0 - 100.0 pg/mL Final     Comment:     Results may be falsely decreased if patient taking Biotin.     Imaging Results (last 24 hours)     Procedure Component Value Units Date/Time    CT Chest Without Contrast [[id number]] Collected:  02/20/19 1945     Updated:  02/20/19 2039    Narrative:       EXAM:    CT Chest Without Contrast     EXAM DATE/TIME:    2/20/2019 7:45 PM     CLINICAL HISTORY:    78 years old, female; Signs and symptoms; Cough and shortness of breath;   Additional info: Cough, SOB, hypoxia, concern for pneumonia     TECHNIQUE:    Axial computed tomography images of the chest without intravenous contrast.    All CT scans at this facility use at least one of these dose optimization   techniques: automated exposure control; mA and/or kV adjustment per patient   size (includes targeted exams where dose is matched to clinical indication); or   iterative reconstruction.    Coronal reformatted images were created and reviewed.     COMPARISON:    CR XR CHEST 1 VW 2/20/2019 4:21 PM     FINDINGS:    Limitations:  Motion artifact does somewhat limit the sensitivity of this   examination.    Lungs:  Mild patchy RLL infiltrate. Clinically correlates to rule out   pneumonia. Minimal scattered subsegmental atelectasis in bilateral lungs in   multilobar  distribution pattern.    Pleural space:  Trace bilateral pleural effusions.  No pneumothorax.   Heart:  There are coronary artery atheromatous calcifications, consistent with   CAD. Mild cardiomegaly. Small pericardial effusion. Some of the pericardial   effusion is within pericardial recesses.    Mediastinum:  Air in distal esophagus versus minimal hiatal hernia.    Aorta:  Aortic atherosclerosis. No aortic aneurysm.    Lymph nodes:  No gross lymphadenopathy on noncontrast exam.    Bones/joints:  Mild scoliosis. Degenerative changes of the spine.    Soft tissues: Unremarkable.    Liver:  Round hypodense 6.5 cm left hepatic nodule, probably a cyst. US could   characterize.    Gallbladder and bile ducts:  Cholecystectomy.    Spleen:  Unremarkable. No splenomegaly or mass.    Adrenals:  Unremarkable adrenals.    Kidneys and ureters:  Visualized upper kidneys are unremarkable.       Impression:       1. Mild cardiomegaly. CAD. Small pericardial effusion.   2. Trace bilateral pleural effusions.   3. Left hepatic 6.5 cm nodule, probably cyst.   4. Mild patchy RLL infiltrate. Clinically correlates to rule out pneumonia.   5. Minimal bilateral scattered subsegmental atelectasis.   6. Additional findings, as above.     THIS DOCUMENT HAS BEEN ELECTRONICALLY SIGNED BY RULA FAITH MD    XR Chest 1 View [[id number]] Collected:  02/20/19 1713     Updated:  02/20/19 1734    Narrative:       EXAMINATION: XR CHEST 1 VW - 2/20/2019     INDICATION: Weakness, dizziness, altered mental status.     TECHNIQUE: Single frontal view of the chest.      COMPARISONS: None.     FINDINGS:  Cardiomegaly. No focal airspace disease or pneumothorax.  Possible trace left effusion and trace edema. Atherosclerosis.       Impression:       Cardiomegaly. Favor trace edema and left effusion.     DICTATED:   2/20/2019  EDITED/ls :   2/20/2019      This report was finalized on 2/20/2019 5:32 PM by Galo Bautista.                Assessment/Plan   Assessment  / Plan     Active Hospital Problems    Diagnosis Date Noted   • **CAP (community acquired pneumonia) [J18.9] 02/20/2019   • Acute on chronic congestive heart failure (CMS/HCC) [I50.9] 02/20/2019   • Hypoxia [R09.02] 02/20/2019   • A-fib (CMS/HCC) [I48.91] 02/20/2019   • Pleural effusion, bilateral [J90] 02/20/2019   • Pericardial effusion [I31.3] 02/20/2019   • CAD (coronary artery disease) [I25.10] 02/20/2019   • Essential hypertension [I10] 02/20/2019   • Liver nodule [K76.89] 02/20/2019   • Bacteriuria [R82.71] 02/20/2019       ASSESSMENT and PLAN:    1.  Hypoxia W/ ? RLL INFILTRATE AS WELL AS PULM EDEMA LIKELY FROM PAF;   -oxygen saturation dropped to 88% while asleep   -oxygen to keep O2 sat >90%   -continuous pulse ox    2.  PNA; CLINICALLY STABLE W/ INTERMITTENT HYPOXIA.  CONT ABX   -azithromycin and rocephin   -BC x 2   -sputum culture   -duonebs   -incentive spirometry   -cbc, bmp in the am    3.  A/C CHF; LASIX 40 IV Q12 X 2 MORE DOSES THEN RE-EVALUATE; ECHO AS NONE RECENT   -was given Lasix 20 IV x 1 dose in the ED   -strict I&O's   -daily weight   -lasix 40 mg IV q 12 hours x 2 doses   -echo in the am   -cbc, bmp, flp in the am    4.  Afib on coumadin with a subtherapeutic INR; RATE CONTROLLED   -now NSR    -pharmacy to dose coumadin   -daily pt/inr   -continue amiodarone    5.  Small pericardial effusion   -echo in the am    6.  Trace bilateral pleural effusions   -monitor    7.  Bacteriuria, SYMPTOMATIC; ABX FOR PNA WILL TREAT   -complains of dysuria   -was treated for a UTI 3 weeks ago   -Rocephin   -cbc in the am    8.  CAD    9.  Liver nodule    10.  H/O osteosarcoma 60 years ago      DVT prophylaxis:  Lovenox    CODE STATUS:    There are no questions and answers to display.       Admission Status:  I believe this patient meets INPATIENT status due to the need for care which can only be reasonably provided in an hospital setting such as possible need for aggressive/expedited ancillary services  and/or consultation services, IV medications, close physician monitoring, and/or procedures.  In such, I feel patient’s risk for adverse outcomes and need for care warrant INPATIENT evaluation and predict the patient’s care encounter to likely last beyond 2 midnights.      Electronically signed by JAD Fam, 19, 10:29 PM.  Electronically signed by Reva Peace MD, 19, 11:42 PM.          Electronically signed by Reva Peace MD at 2019 11:42 PM       [address] [address]  Phone:  [phone]  Fax:   Date: 2019      Ambulatory Referral to Home Health     Patient:  Evelyn Rogers MRN:  [id number]   [address]  [facility] [address] :  1940  SSN:    Phone: [phone] Sex:  F      INSURANCE PAYOR PLAN GROUP # SUBSCRIBER ID   Primary:  Secondary:    MEDICARE ANTHEM BLUE CROSS [id number]  [id number]    [id number]  [id number]      Referring Provider Information:  ARSALAN BARRIENTOS Phone: [phone] Fax:       Referral Information:   # Visits:  1 Referral Type: Home Health [42]   Urgency:  Routine Referral Reason: Specialty Services Required   Start Date: 2019 End Date:  To be determined by Insurer   Diagnosis: Acute on chronic congestive heart failure, unspecified heart failure type (CMS/HCC) (I50.9 [ICD-10-CM] 428.0 [ICD-9-CM])  Impaired functional mobility, balance, gait, and endurance (Z74.09 [ICD-10-CM] V49.89 [ICD-9-CM])      Refer to Dept:   Refer to Provider:   Refer to Facility:       Face to Face Visit Date: 2019  Follow-up Provider for Plan of Care? I treated the patient in an acute care facility and will not continue treatment after discharge.  Follow-up Provider: CORNELIO RENAE [[id number]]  Reason/Clinical Findings: CAP  Describe mobility limitations that make leaving home difficult: impaired functional mobility, balance, gait and endurance  Nursing/Therapeutic  Services Requested: Physical Therapy  Nursing/Therapeutic Services Requested: Occupational Therapy  PT orders: Therapeutic exercise  PT orders: Gait Training  PT orders: Strengthening  PT orders: Home safety assessment  Weight Bearing Status: As Tolerated  Occupational orders: Activities of daily living  Occupational orders: Energy conservation  Occupational orders: Strengthening  Occupational orders: Home safety assessment     This document serves as a request of services and does not constitute Insurance authorization or approval of services.  To determine eligibility, please contact the members Insurance carrier to verify and review coverage.     If you have medical questions regarding this request for services. Please contact [address] at [phone] between the hours of 8:00am - 5:00pm (Mon-Fri).       Verbal Order Mode: Per protocol: cosign required  Authorizing Provider: Angelina Harris DO  Authorizing Provider's NPI: [id number]     Order Entered By: Adelia Gloria RN 2/22/2019 12:51 PM     Electronically signed by: Angelina Harris DO 2/22/2019  3:18 PM

## 2019-02-23 ENCOUNTER — READMISSION MANAGEMENT (OUTPATIENT)
Dept: CALL CENTER | Facility: HOSPITAL | Age: 79
End: 2019-02-23

## 2019-02-23 NOTE — OUTREACH NOTE
Prep Survey      Responses   Facility patient discharged from?  [facility]   Is patient eligible?  Yes   Discharge diagnosis  community acquired pneumonia)    Does the patient have one of the following disease processes/diagnoses(primary or secondary)?  COPD/Pneumonia   Does the patient have Home health ordered?  Yes   What is the Home health agency?   [facility]    Is there a DME ordered?  Yes   What DME was ordered?  [facility] walker   Prep survey completed?  Yes          Julia Toro RN

## 2019-02-25 LAB
BACTERIA SPEC AEROBE CULT: NORMAL
BACTERIA SPEC AEROBE CULT: NORMAL

## 2019-02-26 ENCOUNTER — READMISSION MANAGEMENT (OUTPATIENT)
Dept: CALL CENTER | Facility: HOSPITAL | Age: 79
End: 2019-02-26

## 2019-02-26 ENCOUNTER — DOCUMENTATION (OUTPATIENT)
Dept: CARDIAC REHAB | Facility: HOSPITAL | Age: 79
End: 2019-02-26

## 2019-02-26 NOTE — OUTREACH NOTE
COPD/PN Week 1 Survey      Responses   Facility patient discharged from?  [facility]   Does the patient have one of the following disease processes/diagnoses(primary or secondary)?  COPD/Pneumonia   Is there a successful TCM telephone encounter documented?  No   Was the primary reason for admission:  Pneumonia   Week 1 attempt successful?  Yes   Call start time  1041   General alerts for this patient  Pateint with history of depression - has 3  children - they were grown when they passed away.    Discharge diagnosis  community acquired pneumonia)    Meds reviewed with patient/caregiver?  Yes   Is the patient having any side effects they believe may be caused by any medication additions or changes?  No   Does the patient have all medications ordered at discharge?  Yes   Is the patient taking all medications as directed (includes completed medication regime)?  Yes   Does the patient have a primary care provider?   Yes   Does the patient have an appointment with their PCP or pulmonologist within 7 days of discharge?  Yes   Has the patient kept scheduled appointments due by today?  Yes   What is the [facility] health agency?   [facility]     Has home health visited the patient within 72 hours of discharge?  Yes   What DME was ordered?  [facility] walker   Has all DME been delivered?  Yes   Comments  Patient states she is is breathing better. She is depressed due to loss of kids, and medical   conditions. She really wishes to return back to [place] to live. Reassurance given to patient.    Did the patient receive a copy of their discharge instructions?  Yes   Nursing interventions  Reviewed instructions with patient   What is the patient's perception of their health status since discharge?  Improving   Nursing Interventions  Nurse provided patient education   Is the patient/caregiver able to teach back the hierarchy of who to call/visit for symptoms/problems? PCP, Specialist, Home health nurse, Urgent Care, ED, 911  Yes    Is the patient/caregiver able to teach back signs and symptoms of worsening condition:  Fever/chills, Shortness of breath, Chest pain   Is the patient/caregiver able to teach back importance of completing antibiotic course of treatment?  Yes   Week 1 call completed?  Yes   Wrap up additional comments  Patient denies edema to lower extremities.           Black Aguila RN

## 2019-03-05 ENCOUNTER — READMISSION MANAGEMENT (OUTPATIENT)
Dept: CALL CENTER | Facility: HOSPITAL | Age: 79
End: 2019-03-05

## 2019-03-05 NOTE — OUTREACH NOTE
COPD/PN Week 2 Survey      Responses   Facility patient discharged from?  [facility]   Does the patient have one of the following disease processes/diagnoses(primary or secondary)?  COPD/Pneumonia   Week 2 attempt successful?  Yes   Call start time  1203   Call end time  1208   General alerts for this patient  Pateint with history of depression - has 3  children - they were grown when they passed away.    Discharge diagnosis  community acquired pneumonia)    Meds reviewed with patient/caregiver?  Yes   Is the patient taking all medications as directed (includes completed medication regime)?  Yes   Medication comments  anitbiotics finished   Comments regarding appointments  3/6 PCP   Does the patient have a primary care provider?   Yes   Comments regarding PCP  Dr. Milian PCP 3/6   Has the patient kept scheduled appointments due by today?  N/A   What is the patient's perception of their health status since discharge?  Improving   Is the patient/caregiver able to teach back signs and symptoms of worsening condition:  Fever/chills, Shortness of breath, Chest pain   Is the patient/caregiver able to teach back importance of completing antibiotic course of treatment?  Yes   Week 2 call completed?  Yes   Wrap up additional comments  HH is coming to see her, PT, Occupational, and Nurse comes to see her, just feeling bad but better than she was          Alida Villeda, RN

## 2019-03-14 ENCOUNTER — READMISSION MANAGEMENT (OUTPATIENT)
Dept: CALL CENTER | Facility: HOSPITAL | Age: 79
End: 2019-03-14

## 2019-03-14 NOTE — OUTREACH NOTE
COPD/PN Week 3 Survey      Responses   Facility patient discharged from?  [facility]   Does the patient have one of the following disease processes/diagnoses(primary or secondary)?  COPD/Pneumonia   Was the primary reason for admission:  Pneumonia   Week 3 attempt successful?  No   Unsuccessful attempts  Attempt 1          Evon Almendarez RN

## 2019-03-15 ENCOUNTER — READMISSION MANAGEMENT (OUTPATIENT)
Dept: CALL CENTER | Facility: HOSPITAL | Age: 79
End: 2019-03-15

## 2019-03-15 NOTE — OUTREACH NOTE
COPD/PN Week 3 Survey      Responses   Facility patient discharged from?  [facility]   Does the patient have one of the following disease processes/diagnoses(primary or secondary)?  COPD/Pneumonia   Was the primary reason for admission:  Pneumonia   Week 3 attempt successful?  No   Unsuccessful attempts  Attempt 2          Hannah Hensley, RN

## 2022-08-04 ENCOUNTER — APPOINTMENT (OUTPATIENT)
Dept: GENERAL RADIOLOGY | Facility: HOSPITAL | Age: 82
End: 2022-08-04

## 2022-08-04 ENCOUNTER — HOSPITAL ENCOUNTER (INPATIENT)
Facility: HOSPITAL | Age: 82
LOS: 6 days | Discharge: NURSING, REHAB OR LONG-TERM CARE | End: 2022-08-11
Attending: EMERGENCY MEDICINE | Admitting: FAMILY MEDICINE

## 2022-08-04 ENCOUNTER — APPOINTMENT (OUTPATIENT)
Dept: CT IMAGING | Facility: HOSPITAL | Age: 82
End: 2022-08-04

## 2022-08-04 DIAGNOSIS — S80.11XA CONTUSION OF RIGHT KNEE AND LOWER LEG, INITIAL ENCOUNTER: ICD-10-CM

## 2022-08-04 DIAGNOSIS — S80.02XA CONTUSION OF LEFT KNEE AND LOWER LEG, INITIAL ENCOUNTER: ICD-10-CM

## 2022-08-04 DIAGNOSIS — S80.01XA CONTUSION OF RIGHT KNEE AND LOWER LEG, INITIAL ENCOUNTER: ICD-10-CM
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
## 2022-08-04 DIAGNOSIS — S32.000A COMPRESSION FX, LUMBAR SPINE, CLOSED, INITIAL ENCOUNTER: ICD-10-CM

## 2022-08-04 DIAGNOSIS — S13.9XXA CERVICAL SPRAIN, INITIAL ENCOUNTER: ICD-10-CM

## 2022-08-04 DIAGNOSIS — S80.12XA CONTUSION OF LEFT KNEE AND LOWER LEG, INITIAL ENCOUNTER: ICD-10-CM

## 2022-08-04 DIAGNOSIS — Z74.09 DECREASED AMBULATION STATUS: ICD-10-CM

## 2022-08-04 LAB
ALBUMIN SERPL-MCNC: 4.2 G/DL (ref 3.5–5.2)
ALBUMIN/GLOB SERPL: 1.2 G/DL
ALP SERPL-CCNC: 109 U/L (ref 39–117)
ALT SERPL W P-5'-P-CCNC: 10 U/L (ref 1–33)
ANION GAP SERPL CALCULATED.3IONS-SCNC: 19 MMOL/L (ref 5–15)
AST SERPL-CCNC: 20 U/L (ref 1–32)
BASOPHILS # BLD AUTO: 0.07 10*3/MM3 (ref 0–0.2)
BASOPHILS NFR BLD AUTO: 1.3 % (ref 0–1.5)
BILIRUB SERPL-MCNC: 1.5 MG/DL (ref 0–1.2)
BILIRUB UR QL STRIP: NEGATIVE
BUN SERPL-MCNC: 20 MG/DL (ref 8–23)
BUN/CREAT SERPL: 19.6 (ref 7–25)
CALCIUM SPEC-SCNC: 10.3 MG/DL (ref 8.6–10.5)
CHLORIDE SERPL-SCNC: 97 MMOL/L (ref 98–107)
CK SERPL-CCNC: 48 U/L (ref 20–180)
CLARITY UR: CLEAR
CO2 SERPL-SCNC: 20 MMOL/L (ref 22–29)
COLOR UR: YELLOW
CREAT SERPL-MCNC: 1.02 MG/DL (ref 0.57–1)
DEPRECATED RDW RBC AUTO: 48.2 FL (ref 37–54)
EGFRCR SERPLBLD CKD-EPI 2021: 55.7 ML/MIN/1.73
EOSINOPHIL # BLD AUTO: 0.15 10*3/MM3 (ref 0–0.4)
EOSINOPHIL NFR BLD AUTO: 2.7 % (ref 0.3–6.2)
ERYTHROCYTE [DISTWIDTH] IN BLOOD BY AUTOMATED COUNT: 13.5 % (ref 12.3–15.4)
ERYTHROCYTE [SEDIMENTATION RATE] IN BLOOD: 8 MM/HR (ref 0–30)
FLUAV RNA RESP QL NAA+PROBE: NOT DETECTED
FLUBV RNA RESP QL NAA+PROBE: NOT DETECTED
GLOBULIN UR ELPH-MCNC: 3.6 GM/DL
GLUCOSE SERPL-MCNC: 97 MG/DL (ref 65–99)
GLUCOSE UR STRIP-MCNC: NEGATIVE MG/DL
HCT VFR BLD AUTO: 46.3 % (ref 34–46.6)
HGB BLD-MCNC: 15.5 G/DL (ref 12–15.9)
HGB UR QL STRIP.AUTO: NEGATIVE
HOLD SPECIMEN: NORMAL
IMM GRANULOCYTES # BLD AUTO: 0.02 10*3/MM3 (ref 0–0.05)
IMM GRANULOCYTES NFR BLD AUTO: 0.4 % (ref 0–0.5)
KETONES UR QL STRIP: ABNORMAL
LEUKOCYTE ESTERASE UR QL STRIP.AUTO: NEGATIVE
LYMPHOCYTES # BLD AUTO: 1.95 10*3/MM3 (ref 0.7–3.1)
LYMPHOCYTES NFR BLD AUTO: 34.9 % (ref 19.6–45.3)
MAGNESIUM SERPL-MCNC: 1.7 MG/DL (ref 1.6–2.4)
MCH RBC QN AUTO: 32.7 PG (ref 26.6–33)
MCHC RBC AUTO-ENTMCNC: 33.5 G/DL (ref 31.5–35.7)
MCV RBC AUTO: 97.7 FL (ref 79–97)
MONOCYTES # BLD AUTO: 0.35 10*3/MM3 (ref 0.1–0.9)
MONOCYTES NFR BLD AUTO: 6.3 % (ref 5–12)
NEUTROPHILS NFR BLD AUTO: 3.04 10*3/MM3 (ref 1.7–7)
NEUTROPHILS NFR BLD AUTO: 54.4 % (ref 42.7–76)
NITRITE UR QL STRIP: NEGATIVE
NRBC BLD AUTO-RTO: 0 /100 WBC (ref 0–0.2)
NT-PROBNP SERPL-MCNC: 1238 PG/ML (ref 0–1800)
PH UR STRIP.AUTO: 6 [PH] (ref 5–8)
PLATELET # BLD AUTO: 148 10*3/MM3 (ref 140–450)
PMV BLD AUTO: 10.9 FL (ref 6–12)
POTASSIUM SERPL-SCNC: 3.5 MMOL/L (ref 3.5–5.2)
PROT SERPL-MCNC: 7.8 G/DL (ref 6–8.5)
PROT UR QL STRIP: NEGATIVE
QT INTERVAL: 344 MS
QTC INTERVAL: 465 MS
RBC # BLD AUTO: 4.74 10*6/MM3 (ref 3.77–5.28)
SARS-COV-2 RNA RESP QL NAA+PROBE: NOT DETECTED
SODIUM SERPL-SCNC: 136 MMOL/L (ref 136–145)
SP GR UR STRIP: 1.01 (ref 1–1.03)
T4 FREE SERPL-MCNC: 1.34 NG/DL (ref 0.93–1.7)
TROPONIN T SERPL-MCNC: <0.01 NG/ML (ref 0–0.03)
TSH SERPL DL<=0.05 MIU/L-ACNC: 7.78 UIU/ML (ref 0.27–4.2)
UROBILINOGEN UR QL STRIP: ABNORMAL
WBC NRBC COR # BLD: 5.58 10*3/MM3 (ref 3.4–10.8)
WHOLE BLOOD HOLD COAG: NORMAL
WHOLE BLOOD HOLD SPECIMEN: NORMAL

## 2022-08-04 PROCEDURE — 25010000002 ONDANSETRON PER 1 MG: Performed by: EMERGENCY MEDICINE

## 2022-08-04 PROCEDURE — 87636 SARSCOV2 & INF A&B AMP PRB: CPT | Performed by: EMERGENCY MEDICINE

## 2022-08-04 PROCEDURE — 51702 INSERT TEMP BLADDER CATH: CPT

## 2022-08-04 PROCEDURE — 85025 COMPLETE CBC W/AUTO DIFF WBC: CPT

## 2022-08-04 PROCEDURE — G0378 HOSPITAL OBSERVATION PER HR: HCPCS

## 2022-08-04 PROCEDURE — 81003 URINALYSIS AUTO W/O SCOPE: CPT

## 2022-08-04 PROCEDURE — 85652 RBC SED RATE AUTOMATED: CPT | Performed by: EMERGENCY MEDICINE

## 2022-08-04 PROCEDURE — 83880 ASSAY OF NATRIURETIC PEPTIDE: CPT | Performed by: EMERGENCY MEDICINE

## 2022-08-04 PROCEDURE — 70450 CT HEAD/BRAIN W/O DYE: CPT

## 2022-08-04 PROCEDURE — 99284 EMERGENCY DEPT VISIT MOD MDM: CPT

## 2022-08-04 PROCEDURE — 83735 ASSAY OF MAGNESIUM: CPT

## 2022-08-04 PROCEDURE — 84484 ASSAY OF TROPONIN QUANT: CPT

## 2022-08-04 PROCEDURE — 74176 CT ABD & PELVIS W/O CONTRAST: CPT

## 2022-08-04 PROCEDURE — 93005 ELECTROCARDIOGRAM TRACING: CPT

## 2022-08-04 PROCEDURE — 25010000002 HYDROMORPHONE PER 4 MG: Performed by: EMERGENCY MEDICINE

## 2022-08-04 PROCEDURE — 72125 CT NECK SPINE W/O DYE: CPT

## 2022-08-04 PROCEDURE — 73700 CT LOWER EXTREMITY W/O DYE: CPT

## 2022-08-04 PROCEDURE — 84439 ASSAY OF FREE THYROXINE: CPT | Performed by: FAMILY MEDICINE

## 2022-08-04 PROCEDURE — 84443 ASSAY THYROID STIM HORMONE: CPT | Performed by: EMERGENCY MEDICINE

## 2022-08-04 PROCEDURE — 71250 CT THORAX DX C-: CPT

## 2022-08-04 PROCEDURE — 71045 X-RAY EXAM CHEST 1 VIEW: CPT

## 2022-08-04 PROCEDURE — 99222 1ST HOSP IP/OBS MODERATE 55: CPT | Performed by: FAMILY MEDICINE

## 2022-08-04 PROCEDURE — 80053 COMPREHEN METABOLIC PANEL: CPT

## 2022-08-04 PROCEDURE — 82550 ASSAY OF CK (CPK): CPT | Performed by: EMERGENCY MEDICINE

## 2022-08-04 RX ORDER — SERTRALINE HYDROCHLORIDE 100 MG/1
100 TABLET, FILM COATED ORAL DAILY
Status: DISCONTINUED | OUTPATIENT
Start: 2022-08-04 | End: 2022-08-11 | Stop reason: HOSPADM

## 2022-08-04 RX ORDER — SODIUM CHLORIDE 0.9 % (FLUSH) 0.9 %
10 SYRINGE (ML) INJECTION AS NEEDED
Status: DISCONTINUED | OUTPATIENT
Start: 2022-08-04 | End: 2022-08-11 | Stop reason: HOSPADM

## 2022-08-04 RX ORDER — DONEPEZIL HYDROCHLORIDE 10 MG/1
1 TABLET, FILM COATED ORAL EVERY MORNING
COMMUNITY

## 2022-08-04 RX ORDER — ONDANSETRON 2 MG/ML
4 INJECTION INTRAMUSCULAR; INTRAVENOUS ONCE
Status: COMPLETED | OUTPATIENT
Start: 2022-08-04 | End: 2022-08-04

## 2022-08-04 RX ORDER — AMITRIPTYLINE HYDROCHLORIDE 25 MG/1
1 TABLET, FILM COATED ORAL
COMMUNITY

## 2022-08-04 RX ORDER — BUMETANIDE 1 MG/1
1 TABLET ORAL 2 TIMES DAILY PRN
COMMUNITY
End: 2022-08-11 | Stop reason: HOSPADM

## 2022-08-04 RX ORDER — MEMANTINE HYDROCHLORIDE 10 MG/1
1 TABLET ORAL 2 TIMES DAILY
COMMUNITY

## 2022-08-04 RX ORDER — SERTRALINE HYDROCHLORIDE 100 MG/1
1 TABLET, FILM COATED ORAL DAILY
COMMUNITY

## 2022-08-04 RX ORDER — ALENDRONATE SODIUM 70 MG/1
1 TABLET ORAL
COMMUNITY

## 2022-08-04 RX ORDER — HYDROMORPHONE HYDROCHLORIDE 1 MG/ML
0.25 INJECTION, SOLUTION INTRAMUSCULAR; INTRAVENOUS; SUBCUTANEOUS ONCE
Status: COMPLETED | OUTPATIENT
Start: 2022-08-04 | End: 2022-08-04

## 2022-08-04 RX ORDER — SODIUM CHLORIDE 0.9 % (FLUSH) 0.9 %
10 SYRINGE (ML) INJECTION EVERY 12 HOURS SCHEDULED
Status: DISCONTINUED | OUTPATIENT
Start: 2022-08-04 | End: 2022-08-11 | Stop reason: HOSPADM

## 2022-08-04 RX ORDER — ACETAMINOPHEN 325 MG/1
650 TABLET ORAL EVERY 4 HOURS PRN
Status: DISCONTINUED | OUTPATIENT
Start: 2022-08-04 | End: 2022-08-11 | Stop reason: HOSPADM

## 2022-08-04 RX ORDER — ONDANSETRON 4 MG/1
4 TABLET, FILM COATED ORAL EVERY 6 HOURS PRN
Status: DISCONTINUED | OUTPATIENT
Start: 2022-08-04 | End: 2022-08-11 | Stop reason: HOSPADM

## 2022-08-04 RX ORDER — ALPRAZOLAM 0.5 MG/1
0.5 TABLET ORAL DAILY PRN
Status: DISCONTINUED | OUTPATIENT
Start: 2022-08-04 | End: 2022-08-11 | Stop reason: HOSPADM

## 2022-08-04 RX ORDER — ONDANSETRON 2 MG/ML
4 INJECTION INTRAMUSCULAR; INTRAVENOUS EVERY 6 HOURS PRN
Status: DISCONTINUED | OUTPATIENT
Start: 2022-08-04 | End: 2022-08-11 | Stop reason: HOSPADM

## 2022-08-04 RX ORDER — ACETAMINOPHEN 650 MG/1
650 SUPPOSITORY RECTAL EVERY 4 HOURS PRN
Status: DISCONTINUED | OUTPATIENT
Start: 2022-08-04 | End: 2022-08-11 | Stop reason: HOSPADM

## 2022-08-04 RX ORDER — DONEPEZIL HYDROCHLORIDE 10 MG/1
10 TABLET, FILM COATED ORAL DAILY
Status: DISCONTINUED | OUTPATIENT
Start: 2022-08-05 | End: 2022-08-11 | Stop reason: HOSPADM

## 2022-08-04 RX ORDER — LEVOTHYROXINE SODIUM 88 UG/1
88 TABLET ORAL DAILY
Status: DISCONTINUED | OUTPATIENT
Start: 2022-08-04 | End: 2022-08-11 | Stop reason: HOSPADM

## 2022-08-04 RX ORDER — LEVOTHYROXINE SODIUM 88 UG/1
1 TABLET ORAL DAILY
COMMUNITY

## 2022-08-04 RX ORDER — NYSTATIN 100000 [USP'U]/G
1 POWDER TOPICAL 2 TIMES DAILY PRN
COMMUNITY

## 2022-08-04 RX ORDER — SODIUM CHLORIDE, SODIUM LACTATE, POTASSIUM CHLORIDE, CALCIUM CHLORIDE 600; 310; 30; 20 MG/100ML; MG/100ML; MG/100ML; MG/100ML
125 INJECTION, SOLUTION INTRAVENOUS CONTINUOUS
Status: DISCONTINUED | OUTPATIENT
Start: 2022-08-04 | End: 2022-08-07

## 2022-08-04 RX ORDER — ACETAMINOPHEN 160 MG/5ML
650 SOLUTION ORAL EVERY 4 HOURS PRN
Status: DISCONTINUED | OUTPATIENT
Start: 2022-08-04 | End: 2022-08-11 | Stop reason: HOSPADM

## 2022-08-04 RX ORDER — AMITRIPTYLINE HYDROCHLORIDE 25 MG/1
25 TABLET, FILM COATED ORAL NIGHTLY
Status: DISCONTINUED | OUTPATIENT
Start: 2022-08-04 | End: 2022-08-11 | Stop reason: HOSPADM

## 2022-08-04 RX ORDER — POTASSIUM CHLORIDE 20 MEQ/1
1 TABLET, EXTENDED RELEASE ORAL 2 TIMES DAILY
COMMUNITY

## 2022-08-04 RX ORDER — MEMANTINE HYDROCHLORIDE 10 MG/1
10 TABLET ORAL 2 TIMES DAILY
Status: DISCONTINUED | OUTPATIENT
Start: 2022-08-04 | End: 2022-08-11 | Stop reason: HOSPADM

## 2022-08-04 RX ADMIN — SERTRALINE 100 MG: 100 TABLET, FILM COATED ORAL at 22:08

## 2022-08-04 RX ADMIN — Medication 10 ML: at 22:13

## 2022-08-04 RX ADMIN — SODIUM CHLORIDE, POTASSIUM CHLORIDE, SODIUM LACTATE AND CALCIUM CHLORIDE 125 ML/HR: 600; 310; 30; 20 INJECTION, SOLUTION INTRAVENOUS at 09:14

## 2022-08-04 RX ADMIN — LEVOTHYROXINE SODIUM 88 MCG: 88 TABLET ORAL at 22:08

## 2022-08-04 RX ADMIN — SODIUM CHLORIDE, POTASSIUM CHLORIDE, SODIUM LACTATE AND CALCIUM CHLORIDE 125 ML/HR: 600; 310; 30; 20 INJECTION, SOLUTION INTRAVENOUS at 18:57

## 2022-08-04 RX ADMIN — AMITRIPTYLINE HYDROCHLORIDE 25 MG: 25 TABLET, FILM COATED ORAL at 22:08

## 2022-08-04 RX ADMIN — APIXABAN 5 MG: 5 TABLET, FILM COATED ORAL at 22:08

## 2022-08-04 RX ADMIN — ONDANSETRON 4 MG: 2 INJECTION INTRAMUSCULAR; INTRAVENOUS at 09:16

## 2022-08-04 RX ADMIN — HYDROMORPHONE HYDROCHLORIDE 0.25 MG: 1 INJECTION, SOLUTION INTRAMUSCULAR; INTRAVENOUS; SUBCUTANEOUS at 09:20

## 2022-08-04 RX ADMIN — ACETAMINOPHEN 325MG 650 MG: 325 TABLET ORAL at 22:11

## 2022-08-04 RX ADMIN — MEMANTINE 10 MG: 10 TABLET ORAL at 22:08

## 2022-08-04 RX ADMIN — ALPRAZOLAM 0.5 MG: 0.5 TABLET ORAL at 22:11

## 2022-08-04 NOTE — H&P
"    [facility] Medicine Services  HISTORY AND PHYSICAL    Patient Name: Evelyn Rogers  : 1940  MRN: [id number]  Primary Care Physician: Gerry Thorpe MD  Date of admission: 2022      Subjective   Subjective     Chief Complaint:  Fall at home     HPI:  Evelyn Rogers is a 81 y.o. female with PMH HFpEF, CAD, HTN, hx of pericardial effusion, Afib (eliquis), Anxiety, lupus (questionable diagnosis and not on any meds) , and dementia/cognitive impairment presented after fall from home. She lives at home with her son and usually uses a walker. She reports that she has chronic dizziness and that recently it has gotten worse. She is not able to tell me the complete duration of her symptoms. She has been falling more frequently. Patient is not the best historian (info obtained from ED note, as son did not answer phone). She states \"I have beginning dementia\".  She states she has pain \"all over\". She says she knows she hit her head. She complains of back pain and bilateral lower extremity pain. She denies any shortness of breath.   In the ED, CT of lumbar spine shows compression abnormalities. CT of head shows chronic changes.  She will be admitted to the hospitalist service for further treatment and evalution.       Review of Systems   Gen- No fevers, chills  CV- No chest pain, palpitations  Resp- No cough, dyspnea  GI- No N/V/D, abd pain      All other systems reviewed and are negative.     Personal History     Past Medical History:   Diagnosis Date   • CAD (coronary artery disease)    • CHF (congestive heart failure) (CMS/[facility])    • Hypertension              No past surgical history on file.    Family History:  family history is not on file. Otherwise pertinent FHx was reviewed and unremarkable.     Social History:  reports that she has never smoked. She has never used smokeless tobacco. She reports that she does not drink alcohol and does not use drugs.  Social History     Social History " Narrative   • Not on file       Medications:  Available home medication information reviewed.  (Not in a hospital admission)      Allergies   Allergen Reactions   • Other Unknown (See Comments)     SOMETHING THAT SHE GOT DURING AN MRI       Objective   Objective     Vital Signs:   Temp:  [97.8 °F (36.6 °C)] 97.8 °F (36.6 °C)  Heart Rate:  [115] 115  Resp:  [22] 22  BP: (114)/(75) 114/75       Physical Exam   Constitutional: Awake, alert, female resting in bed, in NAD   Eyes: PERRLA, sclerae anicteric, no conjunctival injection  HENT: NCAT, mucous membranes moist  Neck: Supple, no thyromegaly, no lymphadenopathy, trachea midline  Respiratory: Clear to auscultation bilaterally, nonlabored respirations   Cardiovascular: RRR, no murmurs, rubs, or gallops,   Gastrointestinal: Positive bowel sounds, soft, nontender, nondistended  Musculoskeletal: No bilateral ankle edema, no clubbing or cyanosis to extremities  Psychiatric: Appropriate affect, cooperative  Neurologic: Oriented x 2, with some confusion, speech clear  Skin: No rashes, +hematoma of forehead       Result Review:  I have personally reviewed the results from the time of this admission to 8/4/2022 14:05 EDT and agree with these findings:  [x]  Laboratory list / accordion  []  Microbiology  [x]  Radiology  []  EKG/Telemetry   []  Cardiology/Vascular   []  Pathology  [x]  Old records  []  Other:  Most notable findings include:       LAB RESULTS:      Lab 08/04/22  0819   WBC 5.58   HEMOGLOBIN 15.5   HEMATOCRIT 46.3   PLATELETS 148   NEUTROS ABS 3.04   IMMATURE GRANS (ABS) 0.02   LYMPHS ABS 1.95   MONOS ABS 0.35   EOS ABS 0.15   MCV 97.7*   SED RATE 8         Lab 08/04/22  0819   SODIUM 136   POTASSIUM 3.5   CHLORIDE 97*   CO2 20.0*   ANION GAP 19.0*   BUN 20   CREATININE 1.02*   EGFR 55.7*   GLUCOSE 97   CALCIUM 10.3   MAGNESIUM 1.7   TSH 7.780*         Lab 08/04/22  0819   TOTAL PROTEIN 7.8   ALBUMIN 4.20   GLOBULIN 3.6   ALT (SGPT) 10   AST (SGOT) 20   BILIRUBIN  1.5*   ALK PHOS 109         Lab 08/04/22  0819   PROBNP 1,238.0   TROPONIN T <0.010                 UA    Urinalysis 8/4/22   Specific Esmond, UA 1.011   Ketones, UA Trace (A)   Blood, UA Negative   Leukocytes, UA Negative   Nitrite, UA Negative   (A) Abnormal value              Microbiology Results (last 10 days)     Procedure Component Value - Date/Time    COVID-19 and FLU A/B PCR - Swab, Nasopharynx [[id number]]  (Normal) Collected: 08/04/22 0912    Lab Status: Final result Specimen: Swab from Nasopharynx Updated: 08/04/22 1006     COVID19 Not Detected     Influenza A PCR Not Detected     Influenza B PCR Not Detected    Narrative:      Fact sheet for providers: https://www.fda.gov/media/212604/download    Fact sheet for patients: https://www.fda.gov/media/022811/download    Test performed by PCR.          CT Abdomen Pelvis Without Contrast    Result Date: 8/4/2022  CT CHEST WO CONTRAST DIAGNOSTIC-, CT ABDOMEN PELVIS WO CONTRAST-  Date of Exam: 8/4/2022 9:20 AM  Indication: fall, noac, chest, rib, back and shoulder pain.  Comparison: 02/20/2019  Technique: Contiguous axial CT images were obtained from the top of the lungs to the pubic symphysis without contrast. Sagittal and coronal reconstructions were performed.  Automated exposure control and iterative reconstruction methods were used.    FINDINGS:  Study somewhat limited by lack of IV contrast in the setting of trauma.  Chest:  Mediastinum: Heart size is enlarged similar to the prior study. Again noted is a small pericardial effusion.  Limited noncontrast imaging of the aorta is normal in caliber and somewhat tortuous in its course. Atherosclerotic changes are noted. Origin of great vessels demonstrate no acute abnormality. Limited imaging of the base of the neck is unremarkable  Main pulmonary artery is normal in caliber. No suspicious hilar or mediastinal adenopathy is noted. The esophagus appears grossly unremarkable in appearance.  Coronary arteries  demonstrate extensive coronary artery calcification similar to the prior exam.  Lungs/pleura: Central airways are patent. Small bilateral pleural effusions are noted.  Evaluation of the lung parenchyma is slightly compromised by motion. No pneumothorax is noted.  Groundglass opacity within the left upper lobe anteriorly measuring 8 mm appear stable from the comparison. Lungs are otherwise grossly clear.  Soft Tissues/Bones: Mild S-shaped curvature of the thoracolumbar spine appears similar to the prior study. Vertebral body height and alignment are preserved. Osteopenia limits detection of subtle abnormalities. No acute osseous abnormality is appreciated given the limitations of exam. Multilevel degenerative changes noted of the spine.  Abdomen/Pelvis:  Organs: 6.9 cm low-attenuation lesion within the liver again noted likely representing a cyst. Limited noncontrast imaging of the liver demonstrates no acute abnormality. Patient status post cholecystectomy.  Limited noncontrast imaging of the spleen, pancreas, kidneys and adrenal glands are grossly unremarkable in appearance  GI/Bowel: There appears be a small hiatal hernia. Stomach is decompressed and otherwise grossly unremarkable in appearance.  Small bowel demonstrates no acute abnormality.  Defects within the anterior abdominal wall are noted containing fat. Small area of bowel extends into an area relative diastases without evidence of obstruction.  The colon demonstrates a moderate stool burden without acute inflammatory change. Cecum is displaced superiorly and anteriorly. The appendix is not visualized.  No free fluid is noted within the mesentery.  Pelvis: Patient is status post hysterectomy.  Urinary bladder is grossly unremarkable. Ovaries are not visualized.  Peritoneum/Retroperitoneum: Atherosclerotic changes are noted of the aorta which is normal in caliber. No suspicious retroperitoneal adenopathy is noted.  Bones/Soft Tissues: There is an  age-indeterminate compression deformity of the L4 vertebral body with approximately 25-30% loss of height posteriorly and 2925% loss of height anteriorly. Central loss of height of the L2 vertebral body is also age-indeterminate. No paraspinal hematoma noted.      Impression:  1. Study limited by lack of IV contrast in the setting of trauma 2. No acute traumatic abnormality appreciated within the thorax.   3. Cardiomegaly and small pericardial effusion similar to prior study 4. Small bilateral pleural effusions 5. Age-indeterminate compression deformities of the L2 and L4 vertebral bodies. 6. No definite acute traumatic abnormality otherwise noted of the abdomen and pelvis. 7. Fat-containing ventral hernias 8. No definite acute abnormality on limited noncontrast imaging otherwise noted within the abdomen and pelvis.   This report was finalized on 8/4/2022 10:43 AM by Gary Minaya.      CT Head Without Contrast    Result Date: 8/4/2022  CT HEAD WO CONTRAST-  Date of Exam: 8/4/2022 9:20 AM  Indication: fall, dizziness, noac.  Comparison: None available.  Technique:  Contiguous axial CT images of the head were obtained without contrast from skull base to vertex.  Coronal and sagittal reconstructions were performed.  Automated exposure control and iterative reconstruction methods were used.   FINDINGS: Brain/Ventricles: No acute intracranial hemorrhage or mass effect is noted. There is a mild degree of atrophy and mild white matter changes which are not unexpected for patient's stated age.  No suspicious extra-axial fluid collections are noted.  Orbits: The visualized portion of the orbits demonstrate no acute abnormality.  Sinuses:There is an air-fluid level within the sphenoid sinus posteriorly. No obvious acute osseous abnormality noted.  Soft Tissues/Skull: No definite acute osseous abnormality noted. Small to moderate-sized left frontal scalp contusion noted.      Impression:  1. No acute intracranial  hemorrhage or mass effect 2. White matter changes compatible with small vessel ischemic disease and atrophy which are not unexpected for patient's stated age 3. Small air-fluid level within the sphenoid sinus. Findings favored represent acute sinusitis. No obvious fracture is identified. 4. Left frontal scalp contusion  This report was finalized on 8/4/2022 10:18 AM by Gary Minaya.      CT Chest Without Contrast Diagnostic    Result Date: 8/4/2022  CT CHEST WO CONTRAST DIAGNOSTIC-, CT ABDOMEN PELVIS WO CONTRAST-  Date of Exam: 8/4/2022 9:20 AM  Indication: fall, noac, chest, rib, back and shoulder pain.  Comparison: 02/20/2019  Technique: Contiguous axial CT images were obtained from the top of the lungs to the pubic symphysis without contrast. Sagittal and coronal reconstructions were performed.  Automated exposure control and iterative reconstruction methods were used.    FINDINGS:  Study somewhat limited by lack of IV contrast in the setting of trauma.  Chest:  Mediastinum: Heart size is enlarged similar to the prior study. Again noted is a small pericardial effusion.  Limited noncontrast imaging of the aorta is normal in caliber and somewhat tortuous in its course. Atherosclerotic changes are noted. Origin of great vessels demonstrate no acute abnormality. Limited imaging of the base of the neck is unremarkable  Main pulmonary artery is normal in caliber. No suspicious hilar or mediastinal adenopathy is noted. The esophagus appears grossly unremarkable in appearance.  Coronary arteries demonstrate extensive coronary artery calcification similar to the prior exam.  Lungs/pleura: Central airways are patent. Small bilateral pleural effusions are noted.  Evaluation of the lung parenchyma is slightly compromised by motion. No pneumothorax is noted.  Groundglass opacity within the left upper lobe anteriorly measuring 8 mm appear stable from the comparison. Lungs are otherwise grossly clear.  Soft Tissues/Bones:  Mild S-shaped curvature of the thoracolumbar spine appears similar to the prior study. Vertebral body height and alignment are preserved. Osteopenia limits detection of subtle abnormalities. No acute osseous abnormality is appreciated given the limitations of exam. Multilevel degenerative changes noted of the spine.  Abdomen/Pelvis:  Organs: 6.9 cm low-attenuation lesion within the liver again noted likely representing a cyst. Limited noncontrast imaging of the liver demonstrates no acute abnormality. Patient status post cholecystectomy.  Limited noncontrast imaging of the spleen, pancreas, kidneys and adrenal glands are grossly unremarkable in appearance  GI/Bowel: There appears be a small hiatal hernia. Stomach is decompressed and otherwise grossly unremarkable in appearance.  Small bowel demonstrates no acute abnormality.  Defects within the anterior abdominal wall are noted containing fat. Small area of bowel extends into an area relative diastases without evidence of obstruction.  The colon demonstrates a moderate stool burden without acute inflammatory change. Cecum is displaced superiorly and anteriorly. The appendix is not visualized.  No free fluid is noted within the mesentery.  Pelvis: Patient is status post hysterectomy.  Urinary bladder is grossly unremarkable. Ovaries are not visualized.  Peritoneum/Retroperitoneum: Atherosclerotic changes are noted of the aorta which is normal in caliber. No suspicious retroperitoneal adenopathy is noted.  Bones/Soft Tissues: There is an age-indeterminate compression deformity of the L4 vertebral body with approximately 25-30% loss of height posteriorly and 2925% loss of height anteriorly. Central loss of height of the L2 vertebral body is also age-indeterminate. No paraspinal hematoma noted.      Impression:  1. Study limited by lack of IV contrast in the setting of trauma 2. No acute traumatic abnormality appreciated within the thorax.   3. Cardiomegaly and small  pericardial effusion similar to prior study 4. Small bilateral pleural effusions 5. Age-indeterminate compression deformities of the L2 and L4 vertebral bodies. 6. No definite acute traumatic abnormality otherwise noted of the abdomen and pelvis. 7. Fat-containing ventral hernias 8. No definite acute abnormality on limited noncontrast imaging otherwise noted within the abdomen and pelvis.   This report was finalized on 8/4/2022 10:43 AM by Gary Minaya.      CT Cervical Spine Without Contrast    Result Date: 8/4/2022  DATE OF EXAM: 8/4/2022 9:20 AM  PROCEDURE: CT CERVICAL SPINE WO CONTRAST-  INDICATIONS: fall, neck pain  COMPARISON: No comparisons available.  TECHNIQUE: Routine transaxial slices were obtained through the cervical spine without the administration of intravenous contrast. Reconstructed coronal and sagittal images were also obtained. Automated exposure control and iterative construction methods were used.  The radiation dose reduction device was turned on for each scan per the ALARA (As Low as Reasonably Achievable) protocol.  FINDINGS: Cortical margins are intact and vertebral body heights are maintained, without evidence of acute fracture or traumatic malalignment. The paraspinal soft tissues are unremarkable and the lung apices are grossly clear. Multilevel spondylosis change is evident, with areas of disc osteophyte complex formation and facet arthropathy, appearing likely most advanced at C4-5 and C5-6. The dens is intact. Craniocervical alignment appears maintained.      Impression: Multilevel spondylosis, without evidence of acute fracture or traumatic malalignment.  This report was finalized on 8/4/2022 11:06 AM by Shar Wilkins.      XR Chest 1 View    Result Date: 8/4/2022  DATE OF EXAM: 8/4/2022 8:19 AM  PROCEDURE: XR CHEST 1 VW-  INDICATIONS: Weak/Dizzy/AMS triage protocol  COMPARISON: 2/20/2019  TECHNIQUE: Single radiographic AP view of the chest was obtained.  FINDINGS:  Cardiomegaly. Pulmonary vasculature are within normal limits. Thoracic aorta tortuous. Lungs clear. Costophrenic angles sharp      Impression: Cardiomegaly with no acute cardiopulmonary process demonstrated  This report was finalized on 8/4/2022 8:44 AM by Arsh Hamlin.      CT Lower Extremity Left Without Contrast    Result Date: 8/4/2022  DATE OF EXAM: 8/4/2022 9:20 AM  PROCEDURE: CT LOWER EXTREMITY LEFT WO CONTRAST-, CT LOWER EXTREMITY RIGHT WO CONTRAST-  INDICATIONS: knees to feet, pain, trauma  COMPARISON: None  TECHNIQUE: CT of the bilateral lower extremities from the distal femurs through the feet obtained without the administration of contrast. Coronal and sagittal reformats were obtained. Automated exposure control and alternative reconstruction methods were used.  The radiation dose reduction device was turned on for each scan per the ALARA (As Low as Reasonably Achievable) protocol.  FINDINGS: There is fat stranding and irregular soft tissue density in the subcutaneous fat of the anterior left anterior proximal leg (series 3 image 61), likely soft tissue contusion. Otherwise the soft tissues are unremarkable. There is no evidence of knee joint effusion. The bones are severely demineralized limiting assessment for occult nondisplaced fractures. No acute fracture or traumatic malalignment. Somewhat limited assessment of the bony structures of the ankle and feet owing to large field-of-view imaging and consider dedicated radiographs as warranted. There is moderate to severe tricompartmental osteoarthritic changes within the bilateral knees.      Impression: No acute fracture or traumatic malalignment. Soft tissue contusion of the anterior left knee and proximal leg.  This report was finalized on 8/4/2022 10:56 AM by Zeke Kitchen MD.      CT Lower Extremity Right Without Contrast    Result Date: 8/4/2022  DATE OF EXAM: 8/4/2022 9:20 AM  PROCEDURE: CT LOWER EXTREMITY LEFT WO CONTRAST-, CT LOWER EXTREMITY  RIGHT WO CONTRAST-  INDICATIONS: knees to feet, pain, trauma  COMPARISON: None  TECHNIQUE: CT of the bilateral lower extremities from the distal femurs through the feet obtained without the administration of contrast. Coronal and sagittal reformats were obtained. Automated exposure control and alternative reconstruction methods were used.  The radiation dose reduction device was turned on for each scan per the ALARA (As Low as Reasonably Achievable) protocol.  FINDINGS: There is fat stranding and irregular soft tissue density in the subcutaneous fat of the anterior left anterior proximal leg (series 3 image 61), likely soft tissue contusion. Otherwise the soft tissues are unremarkable. There is no evidence of knee joint effusion. The bones are severely demineralized limiting assessment for occult nondisplaced fractures. No acute fracture or traumatic malalignment. Somewhat limited assessment of the bony structures of the ankle and feet owing to large field-of-view imaging and consider dedicated radiographs as warranted. There is moderate to severe tricompartmental osteoarthritic changes within the bilateral knees.      Impression: No acute fracture or traumatic malalignment. Soft tissue contusion of the anterior left knee and proximal leg.  This report was finalized on 8/4/2022 10:56 AM by Zeke Kitchen MD.        Results for orders placed during the hospital encounter of 02/20/19    Adult Transthoracic Echo Complete With Contrast if Necessary Per Protocol    Interpretation Summary  · Left ventricular systolic function is normal.  · Estimated EF appears to be in the range of 66 - 70%.  · Left ventricular diastolic dysfunction (grade II) consistent with pseudonormalization.  · Moderate tricuspid valve regurgitation is present.  · Left atrial cavity size is severely dilated.  · Calculated right ventricular systolic pressure from tricuspid regurgitation is 55 mmHg.      Assessment & Plan   Assessment & Plan      Active Hospital Problems    Diagnosis  POA   • Fall at home, initial encounter [W19.XXXA, Y92.009]  Not Applicable       82 yo with PMH of HFpEF, Afib, Dementia, Lupus and anxiety that presented after a fall at home. She has had been having increased falls at home.    Multiple Falls at home  Debility   -pt found down on the floor by EMS  -lives at home and uses walker  -PT/OT  -holding home baclofen  -CT shows compression deformity of L2,L4- could consider MRI ( I had planned to talk to son first to see if this had been done or what GOC were but I was not able to reach him)     Cognitive Decline/Dementia  -CT head no acute finding but chronic small vessel ischemia/white matter changes  -cont namenda and aricept    CAD  HFpEF  HTN  AFib  -eliquis   -pt on prn Bumex at home    Hypothyroidism  -synthroid  -TSH elevated, will check T4      Anxiety  -prn xanax    DVT prophylaxis:  eliquis       CODE STATUS:  Pt reports wanting to be a full code at this time.   There are no questions and answers to display.         Ines Lorenz, DO  08/04/22

## 2022-08-04 NOTE — ED PROVIDER NOTES
Subjective   This is a pleasant 81-year-old female brought to the emergency department today by ambulance for evaluation of pain diffusely and fall.  She is accompanied by her son.    At her baseline she is frail.  She lives in her son's house.  She gets around with a walker with some difficulty and is fallen in the past.    Her Dr. Manzo is her doctor.    Her last hospitalization here was in 2019 for pneumonia only subsequent hospitalizations of been for psychiatric issues including confusion after starting gabapentin.    She has been falling frequently and becoming increasingly debilitated.  Her last fall was about a month and a half ago and she was not seen at the hospital for that she just stayed home.  Since that time her health is declined she is gotten more confused more dizzy and more frail.  She has had decreased p.o. intake.    This morning she was trying to get up and apparently fell and got wedged between the commode and a cabinet for about an hour and a half.  She was found and EMS was called and transported her here.    Currently she complains of pain all over including head pain and dizziness without visual field changes.  She has neck pain somewhat acute on chronic.  She has shoulder pain.  She has back pain and pelvic pain in her hips.  She also has bilateral knee pain.  She has what sounds like a pretty severe peripheral neuropathy.  She is chronically anticoagulated currently on Eliquis for A. fib.  Previously on Coumadin.    Patient is a fair historian and her son helps a lot out with history.  No recent medication changes.  She has had severe peripheral edema and actually is better than her baseline currently.          Review of Systems   All other systems reviewed and are negative.      Past Medical History:   Diagnosis Date   • CAD (coronary artery disease)    • CHF (congestive heart failure) (CMS/[facility])    • Hypertension        Allergies   Allergen Reactions   • Other Unknown (See Comments)      SOMETHING THAT SHE GOT DURING AN MRI       No past surgical history on file.    No family history on file.    Social History     Socioeconomic History   • Marital status:    Tobacco Use   • Smoking status: Never Smoker   • Smokeless tobacco: Never Used   Substance and Sexual Activity   • Alcohol use: No   • Drug use: No   • Sexual activity: Defer           Objective   Physical Exam  Vitals and nursing note reviewed.   Constitutional:       Comments: This is an 81-year-old female who is alert she looks uncomfortable she complains of nausea but her GCS is 15.  She looks thin and frail.   HENT:      Head: Normocephalic and atraumatic.      Comments: No bleeding on her scalp and I cannot feel any knots.     Right Ear: External ear normal.      Left Ear: External ear normal.      Nose: Nose normal.      Mouth/Throat:      Mouth: Mucous membranes are moist.      Pharynx: Oropharynx is clear.   Eyes:      Extraocular Movements: Extraocular movements intact.      Conjunctiva/sclera: Conjunctivae normal.      Pupils: Pupils are equal, round, and reactive to light.   Neck:      Vascular: No carotid bruit.      Comments: Modest diffuse tenderness in the paraspinal muscles of the neck down of the shoulders without gross deformity.  Left shoulder is more tender than the right.  Cardiovascular:      Rate and Rhythm: Normal rate.      Heart sounds: Normal heart sounds.      Comments: Some irregular beats.  Pulmonary:      Effort: Pulmonary effort is normal.      Comments: Chest diffusely tender to palpation all the way back to the thoracic spine.  No step-offs noted.  Axilla without masses.  Decreased breath sounds bilaterally.  Abdominal:      Comments: BMI 28 soft with diffuse tenderness.  She has a lump in the periumbilical area that is tender could be a hernia since it has been a long time.  Both flanks are tender without bruising or mass.  Pelvis is stable but tender to palpation.   Musculoskeletal:      Comments:  Upper extremities both shoulders hurt.  Her hands are thin.  She has equal pulses 3/4 in her hands the radial aspect in her hands and elbows are nontender.    Left knee is bruised and swollen and tender resist any range of motion with it.  She has diffuse brawny edema of the legs better than her baseline.  Her feet are thin looks like she has some muscle wasting there they appear to be well-perfused but pulses are 2/4 at their maximum.  No gross deformity but even light touch to her feet and legs causes her extreme pain.  Right side looks about the same same 2/4 pulses.  Right knee is not as swollen but also tender to palpation.  No obvious cellulitis but she has a lot of venous stasis changes with some flaking of the skin.   Lymphadenopathy:      Cervical: No cervical adenopathy.   Skin:     Capillary Refill: Capillary refill takes less than 2 seconds.   Neurological:      Mental Status: She is alert.      Comments: Face symmetric, voice soft, tongue midline.  Vision, hearing, and speech are preserved.  She has moderate generalized weakness without focality just everything hurts to move.         Splint - Cast - Strapping    Date/Time: 8/4/2022 4:29 PM  Performed by: Jayce Lopez MD  Authorized by: Jayce Lopez MD     Comments:      This is a patient with bilateral knee contusions I thought would benefit from elastic bandages on her knees explained the rationale with the patient and her son and they agreed.    4 inch elastic bandages placed on the left and right knee.  Patient tolerated this well with no immediate complications.               ED Course           Recent Results (from the past 24 hour(s))   ECG 12 Lead    Collection Time: 08/04/22  8:06 AM   Result Value Ref Range    QT Interval 344 ms    QTC Interval 465 ms   Comprehensive Metabolic Panel    Collection Time: 08/04/22  8:19 AM    Specimen: Blood   Result Value Ref Range    Glucose 97 65 - 99 mg/dL    BUN 20 8 - 23 mg/dL    Creatinine 1.02  (H) 0.57 - 1.00 mg/dL    Sodium 136 136 - 145 mmol/L    Potassium 3.5 3.5 - 5.2 mmol/L    Chloride 97 (L) 98 - 107 mmol/L    CO2 20.0 (L) 22.0 - 29.0 mmol/L    Calcium 10.3 8.6 - 10.5 mg/dL    Total Protein 7.8 6.0 - 8.5 g/dL    Albumin 4.20 3.50 - 5.20 g/dL    ALT (SGPT) 10 1 - 33 U/L    AST (SGOT) 20 1 - 32 U/L    Alkaline Phosphatase 109 39 - 117 U/L    Total Bilirubin 1.5 (H) 0.0 - 1.2 mg/dL    Globulin 3.6 gm/dL    A/G Ratio 1.2 g/dL    BUN/Creatinine Ratio 19.6 7.0 - 25.0    Anion Gap 19.0 (H) 5.0 - 15.0 mmol/L    eGFR 55.7 (L) >60.0 mL/min/1.73   Troponin    Collection Time: 08/04/22  8:19 AM    Specimen: Blood   Result Value Ref Range    Troponin T <0.010 0.000 - 0.030 ng/mL   Magnesium    Collection Time: 08/04/22  8:19 AM    Specimen: Blood   Result Value Ref Range    Magnesium 1.7 1.6 - 2.4 mg/dL   Green Top (Gel)    Collection Time: 08/04/22  8:19 AM   Result Value Ref Range    Extra Tube Hold for add-ons.    Lavender Top    Collection Time: 08/04/22  8:19 AM   Result Value Ref Range    Extra Tube hold for add-on    Gold Top - SST    Collection Time: 08/04/22  8:19 AM   Result Value Ref Range    Extra Tube Hold for add-ons.    Gray Top    Collection Time: 08/04/22  8:19 AM   Result Value Ref Range    Extra Tube Hold for add-ons.    Light Blue Top    Collection Time: 08/04/22  8:19 AM   Result Value Ref Range    Extra Tube Hold for add-ons.    CBC Auto Differential    Collection Time: 08/04/22  8:19 AM    Specimen: Blood   Result Value Ref Range    WBC 5.58 3.40 - 10.80 10*3/mm3    RBC 4.74 3.77 - 5.28 10*6/mm3    Hemoglobin 15.5 12.0 - 15.9 g/dL    Hematocrit 46.3 34.0 - 46.6 %    MCV 97.7 (H) 79.0 - 97.0 fL    MCH 32.7 26.6 - 33.0 pg    MCHC 33.5 31.5 - 35.7 g/dL    RDW 13.5 12.3 - 15.4 %    RDW-SD 48.2 37.0 - 54.0 fl    MPV 10.9 6.0 - 12.0 fL    Platelets 148 140 - 450 10*3/mm3    Neutrophil % 54.4 42.7 - 76.0 %    Lymphocyte % 34.9 19.6 - 45.3 %    Monocyte % 6.3 5.0 - 12.0 %    Eosinophil % 2.7 0.3 -  6.2 %    Basophil % 1.3 0.0 - 1.5 %    Immature Grans % 0.4 0.0 - 0.5 %    Neutrophils, Absolute 3.04 1.70 - 7.00 10*3/mm3    Lymphocytes, Absolute 1.95 0.70 - 3.10 10*3/mm3    Monocytes, Absolute 0.35 0.10 - 0.90 10*3/mm3    Eosinophils, Absolute 0.15 0.00 - 0.40 10*3/mm3    Basophils, Absolute 0.07 0.00 - 0.20 10*3/mm3    Immature Grans, Absolute 0.02 0.00 - 0.05 10*3/mm3    nRBC 0.0 0.0 - 0.2 /100 WBC   TSH    Collection Time: 08/04/22  8:19 AM    Specimen: Blood   Result Value Ref Range    TSH 7.780 (H) 0.270 - 4.200 uIU/mL   Sedimentation Rate    Collection Time: 08/04/22  8:19 AM    Specimen: Blood   Result Value Ref Range    Sed Rate 8 0 - 30 mm/hr   BNP    Collection Time: 08/04/22  8:19 AM    Specimen: Blood   Result Value Ref Range    proBNP 1,238.0 0.0 - 1,800.0 pg/mL   CK    Collection Time: 08/04/22  8:19 AM    Specimen: Blood   Result Value Ref Range    Creatine Kinase 48 20 - 180 U/L   T4, Free    Collection Time: 08/04/22  8:19 AM    Specimen: Blood   Result Value Ref Range    Free T4 1.34 0.93 - 1.70 ng/dL   Urinalysis With Microscopic If Indicated (No Culture) - Urine, Clean Catch    Collection Time: 08/04/22  9:08 AM    Specimen: Urine, Clean Catch   Result Value Ref Range    Color, UA Yellow Yellow, Straw    Appearance, UA Clear Clear    pH, UA 6.0 5.0 - 8.0    Specific Gravity, UA 1.011 1.001 - 1.030    Glucose, UA Negative Negative    Ketones, UA Trace (A) Negative    Bilirubin, UA Negative Negative    Blood, UA Negative Negative    Protein, UA Negative Negative    Leuk Esterase, UA Negative Negative    Nitrite, UA Negative Negative    Urobilinogen, UA 1.0 E.U./dL 0.2 - 1.0 E.U./dL   COVID-19 and FLU A/B PCR - Swab, Nasopharynx    Collection Time: 08/04/22  9:12 AM    Specimen: Nasopharynx; Swab   Result Value Ref Range    COVID19 Not Detected Not Detected - Ref. Range    Influenza A PCR Not Detected Not Detected    Influenza B PCR Not Detected Not Detected     Note: In addition to lab results  from this visit, the labs listed above may include labs taken at another facility or during a different encounter within the last 24 hours. Please correlate lab times with ED admission and discharge times for further clarification of the services performed during this visit.    CT Head Without Contrast   Final Result       1. No acute intracranial hemorrhage or mass effect   2. White matter changes compatible with small vessel ischemic disease   and atrophy which are not unexpected for patient's stated age   3. Small air-fluid level within the sphenoid sinus. Findings favored   represent acute sinusitis. No obvious fracture is identified.   4. Left frontal scalp contusion       This report was finalized on 8/4/2022 10:18 AM by Gary Minaya.          CT Cervical Spine Without Contrast   Final Result   Multilevel spondylosis, without evidence of acute fracture or traumatic   malalignment.       This report was finalized on 8/4/2022 11:06 AM by Shar Wilkins.          CT Chest Without Contrast Diagnostic   Final Result       1. Study limited by lack of IV contrast in the setting of trauma   2. No acute traumatic abnormality appreciated within the thorax.           3. Cardiomegaly and small pericardial effusion similar to prior study   4. Small bilateral pleural effusions   5. Age-indeterminate compression deformities of the L2 and L4 vertebral   bodies.   6. No definite acute traumatic abnormality otherwise noted of the   abdomen and pelvis.   7. Fat-containing ventral hernias   8. No definite acute abnormality on limited noncontrast imaging   otherwise noted within the abdomen and pelvis.           This report was finalized on 8/4/2022 10:43 AM by Gary Minaya.          CT Abdomen Pelvis Without Contrast   Final Result       1. Study limited by lack of IV contrast in the setting of trauma   2. No acute traumatic abnormality appreciated within the thorax.           3. Cardiomegaly and small pericardial  "effusion similar to prior study   4. Small bilateral pleural effusions   5. Age-indeterminate compression deformities of the L2 and L4 vertebral   bodies.   6. No definite acute traumatic abnormality otherwise noted of the   abdomen and pelvis.   7. Fat-containing ventral hernias   8. No definite acute abnormality on limited noncontrast imaging   otherwise noted within the abdomen and pelvis.           This report was finalized on 8/4/2022 10:43 AM by Gary Minaya.          CT Lower Extremity Right Without Contrast   Final Result   No acute fracture or traumatic malalignment. Soft tissue contusion of   the anterior left knee and proximal leg.       This report was finalized on 8/4/2022 10:56 AM by Zeke Kitchen MD.          CT Lower Extremity Left Without Contrast   Final Result   No acute fracture or traumatic malalignment. Soft tissue contusion of   the anterior left knee and proximal leg.       This report was finalized on 8/4/2022 10:56 AM by Zeke Kitchen MD.          XR Chest 1 View   Final Result   Cardiomegaly with no acute cardiopulmonary process demonstrated       This report was finalized on 8/4/2022 8:44 AM by Arsh Hamlin.            Vitals:    08/04/22 0759 08/04/22 0807   BP: 114/75    Pulse: 115    Resp: 22    Temp:  97.8 °F (36.6 °C)   TempSrc:  Oral   SpO2: 97%    Weight:  74.8 kg (165 lb)   Height:  162.6 cm (64\")     Medications   sodium chloride 0.9 % flush 10 mL (has no administration in time range)   lactated ringers infusion (125 mL/hr Intravenous Currently Infusing 8/4/22 1445)   ondansetron (ZOFRAN) injection 4 mg (4 mg Intravenous Given 8/4/22 0916)   HYDROmorphone (DILAUDID) injection 0.25 mg (0.25 mg Intravenous Given 8/4/22 0920)     ECG/EMG Results (last 24 hours)     Procedure Component Value Units Date/Time    ECG 12 Lead [[id number]] Collected: 08/04/22 0806     Updated: 08/04/22 0906     QT Interval 344 ms      QTC Interval 465 ms     Narrative:      Test Reason : " Weak/Dizzy/AMS protocol  Blood Pressure :   */*   mmHG  Vent. Rate : 110 BPM     Atrial Rate : 107 BPM     P-R Int :   * ms          QRS Dur :  76 ms      QT Int : 344 ms       P-R-T Axes :   *  70 -56 degrees     QTc Int : 465 ms    Atrial fibrillation with rapid ventricular response with premature ventricular or aberrantly conducted complexes  Low voltage QRS  ST & T wave abnormality, consider inferior ischemia  ST & T wave abnormality, consider anterior ischemia  Abnormal ECG  When compared with ECG of 21-FEB-2019 10:14,  Significant changes have occurred    Referred By: EDMD           Confirmed By:         ECG 12 Lead   Final Result   Test Reason : Weak/Dizzy/AMS protocol   Blood Pressure :   */*   mmHG   Vent. Rate : 110 BPM     Atrial Rate : 107 BPM      P-R Int :   * ms          QRS Dur :  76 ms       QT Int : 344 ms       P-R-T Axes :   *  70 -56 degrees      QTc Int : 465 ms      Atrial fibrillation with rapid ventricular response with premature    ventricular or aberrantly conducted complexes   Low voltage QRS   ST & T wave abnormality, consider inferior ischemia   ST & T wave abnormality, consider anterior ischemia   Abnormal ECG   When compared with ECG of 21-FEB-2019 10:14,   Significant changes have occurred   Confirmed by OSEI BROWN MD ([id number]) on 8/4/2022 3:17:21 PM      Referred By: EDMD           Confirmed By: OESI BROWN MD                                        MDM  Number of Diagnoses or Management Options  Cervical sprain, initial encounter  Compression fx, lumbar spine, closed, initial encounter ([facility])  Contusion of left knee and lower leg, initial encounter  Contusion of right knee and lower leg, initial encounter  Decreased ambulation status  Fall at home, initial encounter  Diagnosis management comments:       I reviewed all available studies at the bedside with the patient and her son.  This is lady has had a subacute decline for the past few weeks now multiple falls bilateral knee  contusions but thankfully her CT scan of the lower extremities this was ordered at the request of x-rays they could not x-ray her knees adequately and CT of the head, neck, chest and abdomen and pelvis showed probably old compression fractures and a lot of arthritis but nothing acute.    Even after pain medication attempted to ambulate the patient she was unable to ambulate.    Given her decline in the decreased ambulation status I think she needs to be admitted for ongoing treatment evaluation and she may need rehabilitation in hopes that she can get back to her previous status I discussed this with the patient and her son and she may be too frail to regain her previous ambulatory status and understand this.    I spoke Dr. Murcia, on-call hospital medicine and he and his colleagues will admit the patient.    All are agreeable with the plan       Amount and/or Complexity of Data Reviewed  Clinical lab tests: reviewed  Tests in the radiology section of CPT®: reviewed  Tests in the medicine section of CPT®: reviewed        Final diagnoses:   Fall at home, initial encounter   Contusion of left knee and lower leg, initial encounter   Contusion of right knee and lower leg, initial encounter   Cervical sprain, initial encounter   Compression fx, lumbar spine, closed, initial encounter (HCC)   Decreased ambulation status       ED Disposition  ED Disposition     ED Disposition   Decision to Admit    Condition   --    Comment   Level of Care: Telemetry [5]   Diagnosis: Fall at home, initial encounter [675988]   Admitting Physician: MARÍA BEAR [[id number]]   Attending Physician: MARÍA BEAR [[id number]]               No follow-up provider specified.       Medication List      ASK your doctor about these medications    nystatin 075377 UNIT/GM powder  Commonly known as: MYCOSTATIN  Ask about: Which instructions should I use?     sertraline 100 MG tablet  Commonly known as: ZOLOFT  Ask about: Which  instructions should I use?             Jayce Lopez MD  08/04/22 1765

## 2022-08-05 ENCOUNTER — APPOINTMENT (OUTPATIENT)
Dept: MRI IMAGING | Facility: HOSPITAL | Age: 82
End: 2022-08-05

## 2022-08-05 LAB
ANION GAP SERPL CALCULATED.3IONS-SCNC: 8 MMOL/L (ref 5–15)
BACTERIA UR QL AUTO: NORMAL /HPF
BASOPHILS # BLD AUTO: 0.06 10*3/MM3 (ref 0–0.2)
BASOPHILS NFR BLD AUTO: 1.5 % (ref 0–1.5)
BILIRUB UR QL STRIP: NEGATIVE
BUN SERPL-MCNC: 23 MG/DL (ref 8–23)
BUN/CREAT SERPL: 25.3 (ref 7–25)
CALCIUM SPEC-SCNC: 9.6 MG/DL (ref 8.6–10.5)
CHLORIDE SERPL-SCNC: 106 MMOL/L (ref 98–107)
CLARITY UR: CLEAR
CO2 SERPL-SCNC: 30 MMOL/L (ref 22–29)
COLOR UR: YELLOW
CREAT SERPL-MCNC: 0.91 MG/DL (ref 0.57–1)
DEPRECATED RDW RBC AUTO: 48.5 FL (ref 37–54)
EGFRCR SERPLBLD CKD-EPI 2021: 63.9 ML/MIN/1.73
EOSINOPHIL # BLD AUTO: 0.17 10*3/MM3 (ref 0–0.4)
EOSINOPHIL NFR BLD AUTO: 4.3 % (ref 0.3–6.2)
ERYTHROCYTE [DISTWIDTH] IN BLOOD BY AUTOMATED COUNT: 13.5 % (ref 12.3–15.4)
GLUCOSE SERPL-MCNC: 92 MG/DL (ref 65–99)
GLUCOSE UR STRIP-MCNC: NEGATIVE MG/DL
HCT VFR BLD AUTO: 36.1 % (ref 34–46.6)
HGB BLD-MCNC: 12.4 G/DL (ref 12–15.9)
HGB UR QL STRIP.AUTO: NEGATIVE
HYALINE CASTS UR QL AUTO: NORMAL /LPF
IMM GRANULOCYTES # BLD AUTO: 0.01 10*3/MM3 (ref 0–0.05)
IMM GRANULOCYTES NFR BLD AUTO: 0.3 % (ref 0–0.5)
KETONES UR QL STRIP: NEGATIVE
LEUKOCYTE ESTERASE UR QL STRIP.AUTO: ABNORMAL
LYMPHOCYTES # BLD AUTO: 1.27 10*3/MM3 (ref 0.7–3.1)
LYMPHOCYTES NFR BLD AUTO: 32.3 % (ref 19.6–45.3)
MCH RBC QN AUTO: 33.5 PG (ref 26.6–33)
MCHC RBC AUTO-ENTMCNC: 34.3 G/DL (ref 31.5–35.7)
MCV RBC AUTO: 97.6 FL (ref 79–97)
MONOCYTES # BLD AUTO: 0.43 10*3/MM3 (ref 0.1–0.9)
MONOCYTES NFR BLD AUTO: 10.9 % (ref 5–12)
NEUTROPHILS NFR BLD AUTO: 1.99 10*3/MM3 (ref 1.7–7)
NEUTROPHILS NFR BLD AUTO: 50.7 % (ref 42.7–76)
NITRITE UR QL STRIP: NEGATIVE
NRBC BLD AUTO-RTO: 0 /100 WBC (ref 0–0.2)
PH UR STRIP.AUTO: 7.5 [PH] (ref 5–8)
PLATELET # BLD AUTO: 114 10*3/MM3 (ref 140–450)
PMV BLD AUTO: 10.4 FL (ref 6–12)
POTASSIUM SERPL-SCNC: 3.8 MMOL/L (ref 3.5–5.2)
PROT UR QL STRIP: NEGATIVE
RBC # BLD AUTO: 3.7 10*6/MM3 (ref 3.77–5.28)
RBC # UR STRIP: NORMAL /HPF
REF LAB TEST METHOD: NORMAL
SODIUM SERPL-SCNC: 144 MMOL/L (ref 136–145)
SP GR UR STRIP: 1.01 (ref 1–1.03)
SQUAMOUS #/AREA URNS HPF: NORMAL /HPF
UROBILINOGEN UR QL STRIP: ABNORMAL
WBC # UR STRIP: NORMAL /HPF
WBC NRBC COR # BLD: 3.93 10*3/MM3 (ref 3.4–10.8)

## 2022-08-05 PROCEDURE — 81001 URINALYSIS AUTO W/SCOPE: CPT | Performed by: INTERNAL MEDICINE

## 2022-08-05 PROCEDURE — 25010000002 AMIODARONE IN DEXTROSE 5% 360-4.14 MG/200ML-% SOLUTION: Performed by: INTERNAL MEDICINE

## 2022-08-05 PROCEDURE — 93005 ELECTROCARDIOGRAM TRACING: CPT | Performed by: INTERNAL MEDICINE

## 2022-08-05 PROCEDURE — 80048 BASIC METABOLIC PNL TOTAL CA: CPT | Performed by: FAMILY MEDICINE

## 2022-08-05 PROCEDURE — 85025 COMPLETE CBC W/AUTO DIFF WBC: CPT | Performed by: FAMILY MEDICINE

## 2022-08-05 PROCEDURE — 97162 PT EVAL MOD COMPLEX 30 MIN: CPT

## 2022-08-05 PROCEDURE — 93010 ELECTROCARDIOGRAM REPORT: CPT | Performed by: INTERNAL MEDICINE

## 2022-08-05 PROCEDURE — 25010000002 AMIODARONE IN DEXTROSE 5% 150-4.21 MG/100ML-% SOLUTION: Performed by: INTERNAL MEDICINE

## 2022-08-05 PROCEDURE — 99232 SBSQ HOSP IP/OBS MODERATE 35: CPT | Performed by: INTERNAL MEDICINE

## 2022-08-05 PROCEDURE — 72148 MRI LUMBAR SPINE W/O DYE: CPT

## 2022-08-05 PROCEDURE — 25010000002 MORPHINE PER 10 MG: Performed by: INTERNAL MEDICINE

## 2022-08-05 RX ORDER — MORPHINE SULFATE 2 MG/ML
2 INJECTION, SOLUTION INTRAMUSCULAR; INTRAVENOUS EVERY 4 HOURS PRN
Status: DISCONTINUED | OUTPATIENT
Start: 2022-08-05 | End: 2022-08-08

## 2022-08-05 RX ORDER — CARVEDILOL 3.12 MG/1
3.12 TABLET ORAL 2 TIMES DAILY WITH MEALS
Status: DISCONTINUED | OUTPATIENT
Start: 2022-08-05 | End: 2022-08-10

## 2022-08-05 RX ORDER — AMIODARONE HYDROCHLORIDE 200 MG/1
200 TABLET ORAL EVERY 12 HOURS
Status: DISCONTINUED | OUTPATIENT
Start: 2022-08-13 | End: 2022-08-07

## 2022-08-05 RX ORDER — AMIODARONE HYDROCHLORIDE 200 MG/1
200 TABLET ORAL DAILY
Status: DISCONTINUED | OUTPATIENT
Start: 2022-08-27 | End: 2022-08-07

## 2022-08-05 RX ORDER — AMIODARONE HYDROCHLORIDE 200 MG/1
200 TABLET ORAL EVERY 8 HOURS
Status: DISCONTINUED | OUTPATIENT
Start: 2022-08-06 | End: 2022-08-07

## 2022-08-05 RX ORDER — AMIODARONE HYDROCHLORIDE 200 MG/1
200 TABLET ORAL ONCE
Status: COMPLETED | OUTPATIENT
Start: 2022-08-06 | End: 2022-08-06

## 2022-08-05 RX ADMIN — AMIODARONE HYDROCHLORIDE 0.5 MG/MIN: 1.8 INJECTION, SOLUTION INTRAVENOUS at 16:32

## 2022-08-05 RX ADMIN — SERTRALINE 100 MG: 100 TABLET, FILM COATED ORAL at 10:01

## 2022-08-05 RX ADMIN — AMITRIPTYLINE HYDROCHLORIDE 25 MG: 25 TABLET, FILM COATED ORAL at 21:23

## 2022-08-05 RX ADMIN — MORPHINE SULFATE 2 MG: 2 INJECTION, SOLUTION INTRAMUSCULAR; INTRAVENOUS at 17:06

## 2022-08-05 RX ADMIN — CARVEDILOL 3.12 MG: 3.12 TABLET, FILM COATED ORAL at 17:43

## 2022-08-05 RX ADMIN — APIXABAN 5 MG: 5 TABLET, FILM COATED ORAL at 21:22

## 2022-08-05 RX ADMIN — AMIODARONE HYDROCHLORIDE 150 MG: 1.5 INJECTION, SOLUTION INTRAVENOUS at 10:01

## 2022-08-05 RX ADMIN — DONEPEZIL HYDROCHLORIDE 10 MG: 5 TABLET ORAL at 10:01

## 2022-08-05 RX ADMIN — MEMANTINE 10 MG: 10 TABLET ORAL at 10:01

## 2022-08-05 RX ADMIN — LEVOTHYROXINE SODIUM 88 MCG: 88 TABLET ORAL at 10:01

## 2022-08-05 RX ADMIN — AMIODARONE HYDROCHLORIDE 1 MG/MIN: 1.8 INJECTION, SOLUTION INTRAVENOUS at 10:33

## 2022-08-05 RX ADMIN — SODIUM CHLORIDE, POTASSIUM CHLORIDE, SODIUM LACTATE AND CALCIUM CHLORIDE 125 ML/HR: 600; 310; 30; 20 INJECTION, SOLUTION INTRAVENOUS at 10:04

## 2022-08-05 RX ADMIN — APIXABAN 5 MG: 5 TABLET, FILM COATED ORAL at 10:01

## 2022-08-05 RX ADMIN — Medication 10 ML: at 21:23

## 2022-08-05 RX ADMIN — ALPRAZOLAM 0.5 MG: 0.5 TABLET ORAL at 21:22

## 2022-08-05 RX ADMIN — MEMANTINE 10 MG: 10 TABLET ORAL at 21:22

## 2022-08-05 RX ADMIN — SODIUM CHLORIDE, POTASSIUM CHLORIDE, SODIUM LACTATE AND CALCIUM CHLORIDE 125 ML/HR: 600; 310; 30; 20 INJECTION, SOLUTION INTRAVENOUS at 19:27

## 2022-08-05 NOTE — PLAN OF CARE
Problem: Adult Inpatient Plan of Care  Goal: Plan of Care Review  Outcome: Ongoing, Progressing  Goal: Patient-Specific Goal (Individualized)  Outcome: Ongoing, Progressing  Goal: Absence of Hospital-Acquired Illness or Injury  Outcome: Ongoing, Progressing  Intervention: Identify and Manage Fall Risk  Recent Flowsheet Documentation  Taken 8/5/2022 0800 by Kevan Triplett RN  Safety Promotion/Fall Prevention:   safety round/check completed   room organization consistent   nonskid shoes/slippers when out of bed   lighting adjusted   fall prevention program maintained   clutter free environment maintained  Intervention: Prevent Skin Injury  Recent Flowsheet Documentation  Taken 8/5/2022 0800 by Kevan Triplett RN  Body Position: position changed independently  Intervention: Prevent and Manage VTE (Venous Thromboembolism) Risk  Recent Flowsheet Documentation  Taken 8/5/2022 0800 by Kevan Triplett RN  Activity Management:   dorsiflexion/plantar flexion performed   activity adjusted per tolerance  Intervention: Prevent Infection  Recent Flowsheet Documentation  Taken 8/5/2022 0800 by Kevan Triplett RN  Infection Prevention:   visitors restricted/screened   single patient room provided   rest/sleep promoted   personal protective equipment utilized   equipment surfaces disinfected   environmental surveillance performed   hand hygiene promoted  Goal: Optimal Comfort and Wellbeing  Outcome: Ongoing, Progressing  Goal: Readiness for Transition of Care  Outcome: Ongoing, Progressing   Goal Outcome Evaluation:

## 2022-08-05 NOTE — CASE MANAGEMENT/SOCIAL WORK
Discharge Planning Assessment  [facility]     Patient Name: Evelyn Philip  MRN: [id number]  Today's Date: 8/5/2022    Admit Date: 8/4/2022     Discharge Needs Assessment     Row Name 08/05/22 1151       Living Environment    People in Home alone;other (see comments)  SonWillie resides on property - patient resides alone in an apartment above the garage    Current Living Arrangements apartment    Primary Care Provided by self    Provides Primary Care For no one    Family Caregiver if Needed child(kassi), adult    Family Caregiver Names WILLIE PHILIP Son [phone]    Quality of Family Relationships helpful;involved;supportive    Able to Return to Prior Arrangements yes       Resource/Environmental Concerns    Resource/Environmental Concerns none    Transportation Concerns none       Transition Planning    Patient/Family Anticipates Transition to inpatient rehabilitation facility    Patient/Family Anticipated Services at Transition ;rehabilitation services    Transportation Anticipated family or friend will provide       Discharge Needs Assessment    Readmission Within the Last 30 Days no previous admission in last 30 days    Equipment Currently Used at Home walker, rolling    Concerns to be Addressed discharge planning    Anticipated Changes Related to Illness none    Discharge Facility/Level of Care Needs acute rehab    Current Discharge Risk lives alone               Discharge Plan     Row Name 08/05/22 1152       Plan    Plan Referral to [facility]    Patient/Family in Agreement with Plan yes    Plan Comments CM spoke with patient at bedside and patient's sonWillie via phone. Patient resides in [place], alone. Patient's son resides on property and the patient resides in an apartment above the garage. Patient is independent with ADL’s. Patient states she has been falling more at home and has been feeling dizzy. Patient uses a rolling walker for mobility assistance. Patient denies any current  home health or outpatient services. Patient has medical insurance, prescription coverage and is able to afford/obtain medications without difficulty. Son believes patient would benefit from short term rehab @ discharge and would like a referral to [facility] - patient in agreement. Awaiting OT eval/recs. CM made referral to Catherine, liaison with [facility] pending OT eval. Case management team will continue to follow plan of care and assist with discharge planning as recommendations are available.    Final Discharge Disposition Code 62 - inpatient rehab facility              Continued Care and Services - Admitted Since 8/4/2022    Coordination has not been started for this encounter.          Demographic Summary     Row Name 08/05/22 1149       General Information    Arrived From home    Referral Source emergency department    Reason for Consult discharge planning    Preferred Language English       Contact Information    Contact Information Comments AIMEE PHILIP [phone]               Functional Status     Row Name 08/05/22 1150       Functional Status    Usual Activity Tolerance moderate    Current Activity Tolerance --  See PT/OT notes. Recent falls at home.       Assessment of Health Literacy    How often do you have someone help you read hospital materials? Always    How often do you have problems learning about your medical condition because of difficulty understanding written information? Always    How often do you have a problem understanding what is told to you about your medical condition? Always    How confident are you filling out medical forms by yourself? A little bit    Health Literacy Moderate       Functional Status, IADL    Medications independent    Meal Preparation assistive person    Housekeeping assistive person    Laundry independent    Shopping assistive person       Mental Status    General Appearance WDL WDL       Mental Status Summary    Recent Changes in Mental  Status/Cognitive Functioning no changes       Employment/    Employment Status retired               Psychosocial    No documentation.                Abuse/Neglect    No documentation.                Legal    No documentation.                Substance Abuse    No documentation.                Patient Forms    No documentation.                   Francisca Tejada RN

## 2022-08-05 NOTE — PLAN OF CARE
Goal Outcome Evaluation:  Plan of Care Reviewed With: patient           Outcome Evaluation: VSS,Room air, some pain throughout the night tylenol given. Fall and skin precautions in place at this time. Patient has no questions or concerns at this time. WIll continue to monitor.

## 2022-08-05 NOTE — THERAPY EVALUATION
Patient Name: Evelyn Rogers  : 1940    MRN: [id number]                              Today's Date: 2022       Admit Date: 2022    Visit Dx:     ICD-10-CM ICD-9-CM   1. Fall at home, initial encounter  W19.XXXA E888.9    Y92.009 E849.0   2. Contusion of left knee and lower leg, initial encounter  S80.02XA 924.10    S80.12XA 924.11   3. Contusion of right knee and lower leg, initial encounter  S80.01XA 924.10    S80.11XA 924.11   4. Cervical sprain, initial encounter  S13.9XXA 847.0   5. Compression fx, lumbar spine, closed, initial encounter ([facility])  S32.000A 805.4   6. Decreased ambulation status  Z74.09 780.99     Patient Active Problem List   Diagnosis   • CAP (community acquired pneumonia)   • Acute on chronic congestive heart failure (HCC)   • A-fib ([facility])   • Pleural effusion, bilateral   • Pericardial effusion   • CAD (coronary artery disease)   • Essential hypertension   • Liver nodule   • Bacteriuria   • Fall at home, initial encounter     Past Medical History:   Diagnosis Date   • CAD (coronary artery disease)    • CHF (congestive heart failure) ([facility])    • Hypertension      History reviewed. No pertinent surgical history.   General Information     [facility] Name 22 1147          Physical Therapy Time and Intention    Document Type evaluation  -     Mode of Treatment physical therapy  -     Row Name 22 1147          General Information    Patient Profile Reviewed yes  -MIRZA     Prior Level of Function independent:;bed mobility;ADL's;gait;transfer  -     Existing Precautions/Restrictions fall  -     Barriers to Rehab medically complex;previous functional deficit  -     Row Name 22 1147          Living Environment    People in Home alone  -     Row Name 22 1147          Cognition    Orientation Status (Cognition) oriented to;person;place;situation  -     Row Name 22 1147          Safety Issues, Functional Mobility    Safety Issues Affecting Function (Mobility)  safety precautions follow-through/compliance;insight into deficits/self-awareness;awareness of need for assistance;safety precaution awareness  -MIRZA     Impairments Affecting Function (Mobility) balance;endurance/activity tolerance;shortness of breath;strength  -MIRZA           User Key  (r) = Recorded By, (t) = Taken By, (c) = Cosigned By    Initials Name Provider Type    MIRZA Waleska Perez, PT Physical Therapist               Mobility     Row Name 08/05/22 1148          Bed Mobility    Bed Mobility rolling left;rolling right;scooting/bridging;supine-sit;sit-supine  -MIRZA     Rolling Left [place] (Bed Mobility) modified independence  -MIRZA     Rolling Right [place] (Bed Mobility) modified independence  -MIRZA     Scooting/Bridging [place] (Bed Mobility) minimum assist (75% patient effort)  -MIRZA     Supine-Sit [place] (Bed Mobility) modified independence  -MIRZA     Sit-Supine [place] (Bed Mobility) modified independence  -MIRZA     Comment, (Bed Mobility) patient is very sore in bilat LE's so doesn't want to be touched. she is able to perform bed mobility without assist but takes increased time and effort.  -     Row Name 08/05/22 1148          Transfers    Comment, (Transfers) patient transfers on and off the commode.  -[facility] Name 08/05/22 1148          Bed-Chair Transfer    Bed-Chair [place] (Transfers) minimum assist (75% patient effort)  -MIRZA     Comment, (Bed-Chair Transfer) STS x5 patient is very slow reaching for the handles of the bedside commode slow movement but able to pivot with min assist  -[facility] Name 08/05/22 1148          Sit-Stand Transfer    Sit-Stand [place] (Transfers) minimum assist (75% patient effort)  -[facility] Name 08/05/22 1148          Gait/Stairs (Locomotion)    [place] Level (Gait) unable to assess  -MIRZA     Comment, (Gait/Stairs) patient was able to take steps to and from the commode. she felt too weak to attempt to take steps to the door  -MIRZA            User Key  (r) = Recorded By, (t) = Taken By, (c) = Cosigned By    Initials Name Provider Type    Waleska Vail PT Physical Therapist               Obj/Interventions     [facility] Name 08/05/22 1154          Range of Motion Comprehensive    Comment, General Range of Motion bilat LE knee and hip flexion limited by pain  -[facility] Name 08/05/22 1154          Strength Comprehensive (MMT)    Comment, General Manual Muscle Testing (MMT) Assessment generalized weakness 4-/5  -[facility] Name 08/05/22 1154          Balance    Balance Assessment sitting static balance;sitting dynamic balance;standing static balance;standing dynamic balance  -MIRZA     Static Sitting Balance independent  -MIRZA     Dynamic Sitting Balance independent  -MIRZA     Position, Sitting Balance unsupported;sitting edge of bed  -MIRZA     Static Standing Balance minimal assist  -MIRZA     Dynamic Standing Balance minimal assist  -MIRZA     Position/Device Used, Standing Balance supported  -MIRZA     Balance Interventions standing;dynamic;weight shifting activity  -MIRZA           User Key  (r) = Recorded By, (t) = Taken By, (c) = Cosigned By    Initials Name Provider Type    Waleska Vail PT Physical Therapist               Goals/Plan     Row Name 08/05/22 1201          Bed Mobility Goal 1 (PT)    Activity/Assistive Device (Bed Mobility Goal 1, PT) bed mobility activities, all  -MIRZA     [place] Level/Cues Needed (Bed Mobility Goal 1, PT) independent  -MIRZA     Time Frame (Bed Mobility Goal 1, PT) long term goal (LTG);10 days  -MIRZA     Progress/Outcomes (Bed Mobility Goal 1, PT) goal ongoing  -[facility] Name 08/05/22 1201          Transfer Goal 1 (PT)    Activity/Assistive Device (Transfer Goal 1, PT) sit-to-stand/stand-to-sit  -MIRZA     [place] Level/Cues Needed (Transfer Goal 1, PT) independent  -MIRZA     Time Frame (Transfer Goal 1, PT) long term goal (LTG);10 days  -MIRZA     Progress/Outcome (Transfer Goal 1, PT) goal ongoing  -MIRZA     Row Name 08/05/22  1201          Gait Training Goal 1 (PT)    Activity/Assistive Device (Gait Training Goal 1, PT) gait (walking locomotion);walker, rolling  -MIRZA     [place] Level (Gait Training Goal 1, PT) contact guard required  -MIRZA     Time Frame (Gait Training Goal 1, PT) long term goal (LTG);10 days  -MIRZA     Strategies/Barriers (Gait Training Goal 1, PT) 100  -MIRZA     Progress/Outcome (Gait Training Goal 1, PT) goal ongoing  -MIRZA     Row Name 08/05/22 1201          Therapy Assessment/Plan (PT)    Planned Therapy Interventions (PT) balance training;bed mobility training;gait training;home exercise program;strengthening;transfer training  -MIRZA           User Key  (r) = Recorded By, (t) = Taken By, (c) = Cosigned By    Initials Name Provider Type    Waleska [place], PT Physical Therapist               Clinical Impression     Row Name 08/05/22 1151          Pain    Pretreatment Pain Rating 4/10  -MIRZA     Posttreatment Pain Rating 5/10  -MIRZA     Pain Location - Side/Orientation Bilateral  -MIRZA     Pain Location lower  -MIRZA     Pain Location - extremity  -MIRZA     Pain Intervention(s) Repositioned;Ambulation/increased activity  -MIRZA     Row Name 08/05/22 3756          Plan of Care Review    Plan of Care Reviewed With patient  -MIRZA     Progress improving  -MIRZA     Outcome Evaluation PT eval is completed. patient admitted S/P fall at home. patient demonstrates impaired bed mobility transfers and gait. she was able to transfer on and off the commode with min assist and perform several STS transfers she refused ambulation due to pain and feeling week today. anticipate patient to need SNF placement at D/C  -     Row Name 08/05/22 1399          Therapy Assessment/Plan (PT)    Patient/Family Therapy Goals Statement (PT) get stronger  -MIRZA     Rehab Potential (PT) good, to achieve stated therapy goals  -MIRZA     Criteria for Skilled Interventions Met (PT) yes;skilled treatment is necessary  -MIRZA     Therapy Frequency (PT) daily  -MIRZA     Row Name  08/05/22 1158          Vital Signs    Pre Patient Position Supine  -MIRZA     Intra Patient Position Standing  -MIRZA     Post Patient Position Supine  -MIRZA     Row Name 08/05/22 1158          Positioning and Restraints    Pre-Treatment Position in bed  -MIRZA     Post Treatment Position bed  -MIRZA     In Bed notified nsg;supine;encouraged to call for assist;call light within reach;exit alarm on  -MIRZA           User Key  (r) = Recorded By, (t) = Taken By, (c) = Cosigned By    Initials Name Provider Type    Waleska Vail PT Physical Therapist               Outcome Measures     Row Name 08/05/22 1202          How much help from another person do you currently need...    Turning from your back to your side while in flat bed without using bedrails? 3  -MIRZA     Moving from lying on back to sitting on the side of a flat bed without bedrails? 3  -MIRZA     Moving to and from a bed to a chair (including a wheelchair)? 3  -MIRZA     Standing up from a chair using your arms (e.g., wheelchair, bedside chair)? 3  -MIRZA     Climbing 3-5 steps with a railing? 1  -MIRZA     To walk in hospital room? 2  -MIRZA     AM-PAC 6 Clicks Score (PT) 15  -MIRZA     Highest level of mobility 4 --> Transferred to chair/commode  -MIRZA           User Key  (r) = Recorded By, (t) = Taken By, (c) = Cosigned By    Initials Name Provider Type    Waleska Vail PT Physical Therapist                             Physical Therapy Education                 Title: PT OT SLP Therapies (In Progress)     Topic: Physical Therapy (In Progress)     Point: Mobility training (In Progress)     Learning Progress Summary           Patient Acceptance, E, NR by MIRZA at 8/5/2022 1100                   Point: Home exercise program (In Progress)     Learning Progress Summary           Patient Acceptance, E, NR by MIRZA at 8/5/2022 1100                   Point: Body mechanics (In Progress)     Learning Progress Summary           Patient Acceptance, E, NR by MIRZA at 8/5/2022 1100                    Point: Precautions (In Progress)     Learning Progress Summary           Patient Acceptance, E, NR by MIRZA at 8/5/2022 1100                               User Key     Initials Effective Dates Name Provider Type Discipline    MIRZA 06/16/21 -  Waleska Perez PT Physical Therapist PT              PT Recommendation and Plan  Planned Therapy Interventions (PT): balance training, bed mobility training, gait training, home exercise program, strengthening, transfer training  Plan of Care Reviewed With: patient  Progress: improving  Outcome Evaluation: PT eval is completed. patient admitted S/P fall at home. patient demonstrates impaired bed mobility transfers and gait. she was able to transfer on and off the commode with min assist and perform several STS transfers she refused ambulation due to pain and feeling week today. anticipate patient to need SNF placement at D/C     Time Calculation:    PT Charges     Row Name 08/05/22 1203             Time Calculation    Start Time 1100  -MIRZA      PT Received On 08/05/22  -MIRZA      PT Goal Re-Cert Due Date 08/15/22  -MIRZA              Untimed Charges    PT Eval/Re-eval Minutes 55  -MIRZA              Total Minutes    Untimed Charges Total Minutes 55  -MIRZA       Total Minutes 55  -MIRZA            User Key  (r) = Recorded By, (t) = Taken By, (c) = Cosigned By    Initials Name Provider Type    Waleska Vail PT Physical Therapist              Therapy Charges for Today     Code Description Service Date Service Provider Modifiers Qty    53262644789 HC PT EVAL MOD COMPLEXITY 4 8/5/2022 Waleska Perez PT GP 1          PT G-Codes  AM-PAC 6 Clicks Score (PT): 15    Waleska Perez PT  8/5/2022

## 2022-08-05 NOTE — NURSING NOTE
Woc consulted for tiny open spot on the left coccyx.  Per nurse today area was blanching last night and night shift nurse found an open spot but is charting red and blanchable.  Blanchable means not a pressure injury she most likely has MASD with a touch of IAD as she is incontinent at times.  Nursing has been providing the interventions necessary which is applying barrier cream.    Continue to apply barrier cream twice daily and as needed for soiling and offload patient every 2 hours.  Reconsult Woc if area does not merlin or other skin integrity worsens.

## 2022-08-05 NOTE — PROGRESS NOTES
"                    Clinical Nutrition     Patient Name: Evelyn Rogers  YOB: 1940  MRN: [id number]  Date of Encounter: 08/05/22 12:06 EDT  Admission date: 8/4/2022    Reason for Visit   MST score 2+, \"Unsure\" unintentional weight loss    EMR Reviewed: Yes       Diet Nutrition Related History:      Diet tech spoke to patient at bedside. Patient was slightly confused. Patient initially said she had a very good appetite and didn't know why she was losing weight then said she was having difficulty walking to the kitchen over the last month to get something to eat. Patient says current appetite is good. Diet tech got a bed scale weight of 173 today, however, bed scale   was not zeroed.  Declines any supplements at this time. Patient reports no food allergies.        Anthropometrics   Height: Height: 162.6 cm (64.02\")  Admission Weight: 162 lbs, bed scale  Flowsheet Rows    Flowsheet Row First Filed Value   Admission Height 162.6 cm (64\") Documented at 08/04/2022 0807   Admission Weight 74.8 kg (165 lb) Documented at 08/04/2022 0807        Last Filed Weight:Weight: 73.6 kg (162 lb 3.2 oz) (08/04/22 1843)  Weight Method: Bed scale  BMI: BMI (Calculated): 27.8  BMI classification: Overweight: 25.0-29.9kg/m2    IBW: Ideal Body Weight (IBW) (kg): 55.04    UBW: Unknown - Prior to admission last recorded weights were from 2019  Weight change:   Unknown    % change:  Unknown  Time frame:  Unknown       Current Nutrition Prescription     Diet Regular; Cardiac    No active supplement orders      Average Intake from Charting:       Nothing documented at this time.      Intake History:     Intake Category  <=50%    >= 1 MONTH    Actions   Appropriateness for MSA:  Criteria for further malnutrition exam not met at this time. Follow per protocol.     Interventions:   Follow treatment progress, Advise alternate selection, Interview for preferences, Menu provided, Encourage intake, Supplement offered/refused    Ofe" Justo,   Time Spent: 35 minutes

## 2022-08-05 NOTE — PLAN OF CARE
Goal Outcome Evaluation:  Plan of Care Reviewed With: patient        Progress: improving  Outcome Evaluation: PT eval is completed. patient admitted S/P fall at home. patient demonstrates impaired bed mobility transfers and gait. she was able to transfer on and off the commode with min assist and perform several STS transfers she refused ambulation due to pain and feeling week today. anticipate patient to need SNF placement at D/C

## 2022-08-05 NOTE — PROGRESS NOTES
[facility] Medicine Services  PROGRESS NOTE    Patient Name: Evelyn Rogers  : 1940  MRN: [id number]    Date of Admission: 2022  Primary Care Physician: Gerry Thorpe MD    Subjective   Subjective     CC: f/u weakness    HPI: Slept well overnight. Doing well this am. Says she is generally sore but no specific pain complaint.    ROS:  Gen- No fevers, chills  CV- No chest pain, palpitations  Resp- No cough, dyspnea  GI- No N/V/D, abd pain     Objective   Objective     Vital Signs:   Temp:  [97.4 °F (36.3 °C)-98.2 °F (36.8 °C)] 98.1 °F (36.7 °C)  Heart Rate:  [] 110  Resp:  [20-22] 20  BP: ()/(53-84) 105/68     Physical Exam:  Constitutional: No acute distress, awake, alert  HENT: NCAT, mucous membranes moist  Respiratory: Clear to auscultation bilaterally, respiratory effort normal   Cardiovascular: RRR, no murmurs, rubs, or gallops  Gastrointestinal: Positive bowel sounds, soft, nontender, nondistended  Musculoskeletal: No bilateral ankle edema  Psychiatric: Appropriate affect, cooperative  Neurologic: Oriented x 3, strength symmetric in all extremities, Cranial Nerves grossly intact to confrontation, speech clear  Skin: No rashes    Results Reviewed:  LAB RESULTS:      Lab 22  08   WBC 5.58   HEMOGLOBIN 15.5   HEMATOCRIT 46.3   PLATELETS 148   NEUTROS ABS 3.04   IMMATURE GRANS (ABS) 0.02   LYMPHS ABS 1.95   MONOS ABS 0.35   EOS ABS 0.15   MCV 97.7*   SED RATE 8         Lab 22  0819   SODIUM 136   POTASSIUM 3.5   CHLORIDE 97*   CO2 20.0*   ANION GAP 19.0*   BUN 20   CREATININE 1.02*   EGFR 55.7*   GLUCOSE 97   CALCIUM 10.3   MAGNESIUM 1.7   TSH 7.780*         Lab 22  0819   TOTAL PROTEIN 7.8   ALBUMIN 4.20   GLOBULIN 3.6   ALT (SGPT) 10   AST (SGOT) 20   BILIRUBIN 1.5*   ALK PHOS 109         Lab 22  0819   PROBNP 1,238.0   TROPONIN T <0.010                 Brief Urine Lab Results  (Last result in the past 365 days)      Color   Clarity    Blood   Leuk Est   Nitrite   Protein   CREAT   Urine HCG        08/04/22 0908 Yellow   Clear   Negative   Negative   Negative   Negative                 Microbiology Results Abnormal     Procedure Component Value - Date/Time    COVID-19 and FLU A/B PCR - Swab, Nasopharynx [[id number]]  (Normal) Collected: 08/04/22 0912    Lab Status: Final result Specimen: Swab from Nasopharynx Updated: 08/04/22 1006     COVID19 Not Detected     Influenza A PCR Not Detected     Influenza B PCR Not Detected    Narrative:      Fact sheet for providers: https://www.fda.gov/media/029051/download    Fact sheet for patients: https://www.fda.gov/media/119541/download    Test performed by PCR.          CT Abdomen Pelvis Without Contrast    Result Date: 8/4/2022  CT CHEST WO CONTRAST DIAGNOSTIC-, CT ABDOMEN PELVIS WO CONTRAST-  Date of Exam: 8/4/2022 9:20 AM  Indication: fall, noac, chest, rib, back and shoulder pain.  Comparison: 02/20/2019  Technique: Contiguous axial CT images were obtained from the top of the lungs to the pubic symphysis without contrast. Sagittal and coronal reconstructions were performed.  Automated exposure control and iterative reconstruction methods were used.    FINDINGS:  Study somewhat limited by lack of IV contrast in the setting of trauma.  Chest:  Mediastinum: Heart size is enlarged similar to the prior study. Again noted is a small pericardial effusion.  Limited noncontrast imaging of the aorta is normal in caliber and somewhat tortuous in its course. Atherosclerotic changes are noted. Origin of great vessels demonstrate no acute abnormality. Limited imaging of the base of the neck is unremarkable  Main pulmonary artery is normal in caliber. No suspicious hilar or mediastinal adenopathy is noted. The esophagus appears grossly unremarkable in appearance.  Coronary arteries demonstrate extensive coronary artery calcification similar to the prior exam.  Lungs/pleura: Central airways are patent. Small bilateral  pleural effusions are noted.  Evaluation of the lung parenchyma is slightly compromised by motion. No pneumothorax is noted.  Groundglass opacity within the left upper lobe anteriorly measuring 8 mm appear stable from the comparison. Lungs are otherwise grossly clear.  Soft Tissues/Bones: Mild S-shaped curvature of the thoracolumbar spine appears similar to the prior study. Vertebral body height and alignment are preserved. Osteopenia limits detection of subtle abnormalities. No acute osseous abnormality is appreciated given the limitations of exam. Multilevel degenerative changes noted of the spine.  Abdomen/Pelvis:  Organs: 6.9 cm low-attenuation lesion within the liver again noted likely representing a cyst. Limited noncontrast imaging of the liver demonstrates no acute abnormality. Patient status post cholecystectomy.  Limited noncontrast imaging of the spleen, pancreas, kidneys and adrenal glands are grossly unremarkable in appearance  GI/Bowel: There appears be a small hiatal hernia. Stomach is decompressed and otherwise grossly unremarkable in appearance.  Small bowel demonstrates no acute abnormality.  Defects within the anterior abdominal wall are noted containing fat. Small area of bowel extends into an area relative diastases without evidence of obstruction.  The colon demonstrates a moderate stool burden without acute inflammatory change. Cecum is displaced superiorly and anteriorly. The appendix is not visualized.  No free fluid is noted within the mesentery.  Pelvis: Patient is status post hysterectomy.  Urinary bladder is grossly unremarkable. Ovaries are not visualized.  Peritoneum/Retroperitoneum: Atherosclerotic changes are noted of the aorta which is normal in caliber. No suspicious retroperitoneal adenopathy is noted.  Bones/Soft Tissues: There is an age-indeterminate compression deformity of the L4 vertebral body with approximately 25-30% loss of height posteriorly and 2925% loss of height  anteriorly. Central loss of height of the L2 vertebral body is also age-indeterminate. No paraspinal hematoma noted.      Impression:  1. Study limited by lack of IV contrast in the setting of trauma 2. No acute traumatic abnormality appreciated within the thorax.   3. Cardiomegaly and small pericardial effusion similar to prior study 4. Small bilateral pleural effusions 5. Age-indeterminate compression deformities of the L2 and L4 vertebral bodies. 6. No definite acute traumatic abnormality otherwise noted of the abdomen and pelvis. 7. Fat-containing ventral hernias 8. No definite acute abnormality on limited noncontrast imaging otherwise noted within the abdomen and pelvis.   This report was finalized on 8/4/2022 10:43 AM by aGry Minaya.      CT Head Without Contrast    Result Date: 8/4/2022  CT HEAD WO CONTRAST-  Date of Exam: 8/4/2022 9:20 AM  Indication: fall, dizziness, noac.  Comparison: None available.  Technique:  Contiguous axial CT images of the head were obtained without contrast from skull base to vertex.  Coronal and sagittal reconstructions were performed.  Automated exposure control and iterative reconstruction methods were used.   FINDINGS: Brain/Ventricles: No acute intracranial hemorrhage or mass effect is noted. There is a mild degree of atrophy and mild white matter changes which are not unexpected for patient's stated age.  No suspicious extra-axial fluid collections are noted.  Orbits: The visualized portion of the orbits demonstrate no acute abnormality.  Sinuses:There is an air-fluid level within the sphenoid sinus posteriorly. No obvious acute osseous abnormality noted.  Soft Tissues/Skull: No definite acute osseous abnormality noted. Small to moderate-sized left frontal scalp contusion noted.      Impression:  1. No acute intracranial hemorrhage or mass effect 2. White matter changes compatible with small vessel ischemic disease and atrophy which are not unexpected for patient's  stated age 3. Small air-fluid level within the sphenoid sinus. Findings favored represent acute sinusitis. No obvious fracture is identified. 4. Left frontal scalp contusion  This report was finalized on 8/4/2022 10:18 AM by Gary Minaya.      CT Chest Without Contrast Diagnostic    Result Date: 8/4/2022  CT CHEST WO CONTRAST DIAGNOSTIC-, CT ABDOMEN PELVIS WO CONTRAST-  Date of Exam: 8/4/2022 9:20 AM  Indication: fall, noac, chest, rib, back and shoulder pain.  Comparison: 02/20/2019  Technique: Contiguous axial CT images were obtained from the top of the lungs to the pubic symphysis without contrast. Sagittal and coronal reconstructions were performed.  Automated exposure control and iterative reconstruction methods were used.    FINDINGS:  Study somewhat limited by lack of IV contrast in the setting of trauma.  Chest:  Mediastinum: Heart size is enlarged similar to the prior study. Again noted is a small pericardial effusion.  Limited noncontrast imaging of the aorta is normal in caliber and somewhat tortuous in its course. Atherosclerotic changes are noted. Origin of great vessels demonstrate no acute abnormality. Limited imaging of the base of the neck is unremarkable  Main pulmonary artery is normal in caliber. No suspicious hilar or mediastinal adenopathy is noted. The esophagus appears grossly unremarkable in appearance.  Coronary arteries demonstrate extensive coronary artery calcification similar to the prior exam.  Lungs/pleura: Central airways are patent. Small bilateral pleural effusions are noted.  Evaluation of the lung parenchyma is slightly compromised by motion. No pneumothorax is noted.  Groundglass opacity within the left upper lobe anteriorly measuring 8 mm appear stable from the comparison. Lungs are otherwise grossly clear.  Soft Tissues/Bones: Mild S-shaped curvature of the thoracolumbar spine appears similar to the prior study. Vertebral body height and alignment are preserved. Osteopenia  limits detection of subtle abnormalities. No acute osseous abnormality is appreciated given the limitations of exam. Multilevel degenerative changes noted of the spine.  Abdomen/Pelvis:  Organs: 6.9 cm low-attenuation lesion within the liver again noted likely representing a cyst. Limited noncontrast imaging of the liver demonstrates no acute abnormality. Patient status post cholecystectomy.  Limited noncontrast imaging of the spleen, pancreas, kidneys and adrenal glands are grossly unremarkable in appearance  GI/Bowel: There appears be a small hiatal hernia. Stomach is decompressed and otherwise grossly unremarkable in appearance.  Small bowel demonstrates no acute abnormality.  Defects within the anterior abdominal wall are noted containing fat. Small area of bowel extends into an area relative diastases without evidence of obstruction.  The colon demonstrates a moderate stool burden without acute inflammatory change. Cecum is displaced superiorly and anteriorly. The appendix is not visualized.  No free fluid is noted within the mesentery.  Pelvis: Patient is status post hysterectomy.  Urinary bladder is grossly unremarkable. Ovaries are not visualized.  Peritoneum/Retroperitoneum: Atherosclerotic changes are noted of the aorta which is normal in caliber. No suspicious retroperitoneal adenopathy is noted.  Bones/Soft Tissues: There is an age-indeterminate compression deformity of the L4 vertebral body with approximately 25-30% loss of height posteriorly and 2925% loss of height anteriorly. Central loss of height of the L2 vertebral body is also age-indeterminate. No paraspinal hematoma noted.      Impression:  1. Study limited by lack of IV contrast in the setting of trauma 2. No acute traumatic abnormality appreciated within the thorax.   3. Cardiomegaly and small pericardial effusion similar to prior study 4. Small bilateral pleural effusions 5. Age-indeterminate compression deformities of the L2 and L4  vertebral bodies. 6. No definite acute traumatic abnormality otherwise noted of the abdomen and pelvis. 7. Fat-containing ventral hernias 8. No definite acute abnormality on limited noncontrast imaging otherwise noted within the abdomen and pelvis.   This report was finalized on 8/4/2022 10:43 AM by Gary Minaya.      CT Cervical Spine Without Contrast    Result Date: 8/4/2022  DATE OF EXAM: 8/4/2022 9:20 AM  PROCEDURE: CT CERVICAL SPINE WO CONTRAST-  INDICATIONS: fall, neck pain  COMPARISON: No comparisons available.  TECHNIQUE: Routine transaxial slices were obtained through the cervical spine without the administration of intravenous contrast. Reconstructed coronal and sagittal images were also obtained. Automated exposure control and iterative construction methods were used.  The radiation dose reduction device was turned on for each scan per the ALARA (As Low as Reasonably Achievable) protocol.  FINDINGS: Cortical margins are intact and vertebral body heights are maintained, without evidence of acute fracture or traumatic malalignment. The paraspinal soft tissues are unremarkable and the lung apices are grossly clear. Multilevel spondylosis change is evident, with areas of disc osteophyte complex formation and facet arthropathy, appearing likely most advanced at C4-5 and C5-6. The dens is intact. Craniocervical alignment appears maintained.      Impression: Multilevel spondylosis, without evidence of acute fracture or traumatic malalignment.  This report was finalized on 8/4/2022 11:06 AM by Shar Wilkins.      XR Chest 1 View    Result Date: 8/4/2022  DATE OF EXAM: 8/4/2022 8:19 AM  PROCEDURE: XR CHEST 1 VW-  INDICATIONS: Weak/Dizzy/AMS triage protocol  COMPARISON: 2/20/2019  TECHNIQUE: Single radiographic AP view of the chest was obtained.  FINDINGS: Cardiomegaly. Pulmonary vasculature are within normal limits. Thoracic aorta tortuous. Lungs clear. Costophrenic angles sharp      Impression:  Cardiomegaly with no acute cardiopulmonary process demonstrated  This report was finalized on 8/4/2022 8:44 AM by Arsh Hamlin.      CT Lower Extremity Left Without Contrast    Result Date: 8/4/2022  DATE OF EXAM: 8/4/2022 9:20 AM  PROCEDURE: CT LOWER EXTREMITY LEFT WO CONTRAST-, CT LOWER EXTREMITY RIGHT WO CONTRAST-  INDICATIONS: knees to feet, pain, trauma  COMPARISON: None  TECHNIQUE: CT of the bilateral lower extremities from the distal femurs through the feet obtained without the administration of contrast. Coronal and sagittal reformats were obtained. Automated exposure control and alternative reconstruction methods were used.  The radiation dose reduction device was turned on for each scan per the ALARA (As Low as Reasonably Achievable) protocol.  FINDINGS: There is fat stranding and irregular soft tissue density in the subcutaneous fat of the anterior left anterior proximal leg (series 3 image 61), likely soft tissue contusion. Otherwise the soft tissues are unremarkable. There is no evidence of knee joint effusion. The bones are severely demineralized limiting assessment for occult nondisplaced fractures. No acute fracture or traumatic malalignment. Somewhat limited assessment of the bony structures of the ankle and feet owing to large field-of-view imaging and consider dedicated radiographs as warranted. There is moderate to severe tricompartmental osteoarthritic changes within the bilateral knees.      Impression: No acute fracture or traumatic malalignment. Soft tissue contusion of the anterior left knee and proximal leg.  This report was finalized on 8/4/2022 10:56 AM by Zeke Kitchen MD.      CT Lower Extremity Right Without Contrast    Result Date: 8/4/2022  DATE OF EXAM: 8/4/2022 9:20 AM  PROCEDURE: CT LOWER EXTREMITY LEFT WO CONTRAST-, CT LOWER EXTREMITY RIGHT WO CONTRAST-  INDICATIONS: knees to feet, pain, trauma  COMPARISON: None  TECHNIQUE: CT of the bilateral lower extremities from the  distal femurs through the feet obtained without the administration of contrast. Coronal and sagittal reformats were obtained. Automated exposure control and alternative reconstruction methods were used.  The radiation dose reduction device was turned on for each scan per the ALARA (As Low as Reasonably Achievable) protocol.  FINDINGS: There is fat stranding and irregular soft tissue density in the subcutaneous fat of the anterior left anterior proximal leg (series 3 image 61), likely soft tissue contusion. Otherwise the soft tissues are unremarkable. There is no evidence of knee joint effusion. The bones are severely demineralized limiting assessment for occult nondisplaced fractures. No acute fracture or traumatic malalignment. Somewhat limited assessment of the bony structures of the ankle and feet owing to large field-of-view imaging and consider dedicated radiographs as warranted. There is moderate to severe tricompartmental osteoarthritic changes within the bilateral knees.      Impression: No acute fracture or traumatic malalignment. Soft tissue contusion of the anterior left knee and proximal leg.  This report was finalized on 8/4/2022 10:56 AM by Zeke Kitchen MD.        Results for orders placed during the hospital encounter of 02/20/19    Adult Transthoracic Echo Complete With Contrast if Necessary Per Protocol    Interpretation Summary  · Left ventricular systolic function is normal.  · Estimated EF appears to be in the range of 66 - 70%.  · Left ventricular diastolic dysfunction (grade II) consistent with pseudonormalization.  · Moderate tricuspid valve regurgitation is present.  · Left atrial cavity size is severely dilated.  · Calculated right ventricular systolic pressure from tricuspid regurgitation is 55 mmHg.      I have reviewed the medications:  Scheduled Meds:amitriptyline, 25 mg, Oral, Nightly  apixaban, 5 mg, Oral, Q12H  donepezil, 10 mg, Oral, Daily  levothyroxine, 88 mcg, Oral,  Daily  memantine, 10 mg, Oral, BID  sertraline, 100 mg, Oral, Daily  sodium chloride, 10 mL, Intravenous, Q12H      Continuous Infusions:lactated ringers, 125 mL/hr, Last Rate: 125 mL/hr (08/05/22 0604)      PRN Meds:.•  acetaminophen **OR** acetaminophen **OR** acetaminophen  •  ALPRAZolam  •  ondansetron **OR** ondansetron  •  sodium chloride  •  sodium chloride    Assessment & Plan   Assessment & Plan     Active Hospital Problems    Diagnosis  POA   • Fall at home, initial encounter [W19.XXXA, Y92.009]  Not Applicable      Resolved Hospital Problems   No resolved problems to display.        Brief Hospital Course to date:  Evelyn Rogers is a 80 yo with PMH of HFpEF, Afib, Dementia, Lupus and anxiety that presented after a fall at home. She has had been having increased falls at home.     Multiple Falls at home  Debility   -Pt found down on the floor by EMS, lives at home and uses walker. I spoke with her son this am. Fell about 6 weeks ago and has been declining since. Though he does not recall if she specifically c/o low back pain since her CT does show L2/L4 compression he is agreeable to MRI back and PT eval. If pain w/ ambulating and MRI confirms compression fx will consider NS eval.  -Check orthostatics.    PAF  -He also believes that her PAF is contributing to her dizziness/weakness particularly since she has been symptomatic with a fib in past. She follows with Dr. Gordon/Mireya as outpatient and was taken off of her amiodarone recently though he cannot recall why. He does say she did not have any pulmonary or liver effects from it and would like it to be reinitiated since she had tolerated it well before.   -Will start amio protocol and obtain records from Dr. Gordon. Continue Eliquis.    Cognitive Decline/Dementia  -CT head no acute finding but chronic small vessel ischemia/white matter changes  -cont namenda and aricept     CAD  HFpEF  HTN  -She is currently slightly hypotensive which may also be contributing  to her dizziness/weakness. Note that she is on prn Bumex at home which we are holding.      Hypothyroidism  -synthroid     Anxiety  -prn xanax    Expected Discharge Location and Transportation: SNF  Expected Discharge Date: 8/8    DVT prophylaxis:  Medical DVT prophylaxis orders are present.     AM-PAC 6 Clicks Score (PT): 11 (08/04/22 7066)    CODE STATUS:   Code Status and Medical Interventions:   Ordered at: 08/04/22 1651     Level Of Support Discussed With:    Patient     Code Status (Patient has no pulse and is not breathing):    CPR (Attempt to Resuscitate)     Medical Interventions (Patient has pulse or is breathing):    Full Support       Ni Murcia II, DO  08/05/22

## 2022-08-06 PROCEDURE — 93005 ELECTROCARDIOGRAM TRACING: CPT | Performed by: INTERNAL MEDICINE

## 2022-08-06 PROCEDURE — 97165 OT EVAL LOW COMPLEX 30 MIN: CPT

## 2022-08-06 PROCEDURE — 25010000002 AMIODARONE IN DEXTROSE 5% 360-4.14 MG/200ML-% SOLUTION: Performed by: INTERNAL MEDICINE

## 2022-08-06 PROCEDURE — 93010 ELECTROCARDIOGRAM REPORT: CPT | Performed by: INTERNAL MEDICINE

## 2022-08-06 PROCEDURE — 99232 SBSQ HOSP IP/OBS MODERATE 35: CPT | Performed by: INTERNAL MEDICINE

## 2022-08-06 RX ORDER — AMOXICILLIN 250 MG
2 CAPSULE ORAL 2 TIMES DAILY
Status: DISCONTINUED | OUTPATIENT
Start: 2022-08-06 | End: 2022-08-11 | Stop reason: HOSPADM

## 2022-08-06 RX ORDER — BISACODYL 10 MG
10 SUPPOSITORY, RECTAL RECTAL DAILY PRN
Status: DISCONTINUED | OUTPATIENT
Start: 2022-08-06 | End: 2022-08-11 | Stop reason: HOSPADM

## 2022-08-06 RX ORDER — BISACODYL 5 MG/1
5 TABLET, DELAYED RELEASE ORAL DAILY PRN
Status: DISCONTINUED | OUTPATIENT
Start: 2022-08-06 | End: 2022-08-11 | Stop reason: HOSPADM

## 2022-08-06 RX ORDER — POLYETHYLENE GLYCOL 3350 17 G/17G
17 POWDER, FOR SOLUTION ORAL DAILY PRN
Status: DISCONTINUED | OUTPATIENT
Start: 2022-08-06 | End: 2022-08-11 | Stop reason: HOSPADM

## 2022-08-06 RX ADMIN — ALPRAZOLAM 0.5 MG: 0.5 TABLET ORAL at 20:41

## 2022-08-06 RX ADMIN — MEMANTINE 10 MG: 10 TABLET ORAL at 08:32

## 2022-08-06 RX ADMIN — MEMANTINE 10 MG: 10 TABLET ORAL at 20:40

## 2022-08-06 RX ADMIN — CARVEDILOL 3.12 MG: 3.12 TABLET, FILM COATED ORAL at 08:32

## 2022-08-06 RX ADMIN — AMIODARONE HYDROCHLORIDE 200 MG: 200 TABLET ORAL at 10:32

## 2022-08-06 RX ADMIN — SERTRALINE 100 MG: 100 TABLET, FILM COATED ORAL at 08:32

## 2022-08-06 RX ADMIN — AMIODARONE HYDROCHLORIDE 200 MG: 200 TABLET ORAL at 18:34

## 2022-08-06 RX ADMIN — DONEPEZIL HYDROCHLORIDE 10 MG: 5 TABLET ORAL at 08:32

## 2022-08-06 RX ADMIN — LEVOTHYROXINE SODIUM 88 MCG: 88 TABLET ORAL at 08:32

## 2022-08-06 RX ADMIN — AMIODARONE HYDROCHLORIDE 0.5 MG/MIN: 1.8 INJECTION, SOLUTION INTRAVENOUS at 04:03

## 2022-08-06 RX ADMIN — APIXABAN 5 MG: 5 TABLET, FILM COATED ORAL at 08:32

## 2022-08-06 RX ADMIN — SODIUM CHLORIDE, POTASSIUM CHLORIDE, SODIUM LACTATE AND CALCIUM CHLORIDE 125 ML/HR: 600; 310; 30; 20 INJECTION, SOLUTION INTRAVENOUS at 21:39

## 2022-08-06 RX ADMIN — AMITRIPTYLINE HYDROCHLORIDE 25 MG: 25 TABLET, FILM COATED ORAL at 20:40

## 2022-08-06 RX ADMIN — CARVEDILOL 3.12 MG: 3.12 TABLET, FILM COATED ORAL at 18:34

## 2022-08-06 RX ADMIN — Medication 10 ML: at 20:41

## 2022-08-06 RX ADMIN — SODIUM CHLORIDE, POTASSIUM CHLORIDE, SODIUM LACTATE AND CALCIUM CHLORIDE 125 ML/HR: 600; 310; 30; 20 INJECTION, SOLUTION INTRAVENOUS at 04:03

## 2022-08-06 RX ADMIN — APIXABAN 5 MG: 5 TABLET, FILM COATED ORAL at 20:40

## 2022-08-06 NOTE — PLAN OF CARE
Goal Outcome Evaluation:  Plan of Care Reviewed With: patient        Progress: no change (Initial Eval)  Outcome Evaluation: OT initial eval completed. Pt presenting w/ generalized weakness, decreased activity tolerance/endurance, impaired balance/transfers/mobility and increased need for assist w/ ADLs following a fall at home. Pt deferred all OOB/EOB d/t all over pain and fatigue. SUA for grooming tasks and DEP for LB dressing. If pt is limited in future sessions by back pain she would benefit from AE for increased I w/ LB dressing, to be assessed further when pain is under control. Recommend SNF upon d/c for best functional outcome.

## 2022-08-06 NOTE — PROGRESS NOTES
[facility] Medicine Services  PROGRESS NOTE    Patient Name: Evelyn Rogers  : 1940  MRN: [id number]    Date of Admission: 2022  Primary Care Physician: Gerry Thorpe MD    Subjective   Subjective     CC: f/u weakness    HPI:   Resting in bed in no acute distress. complains of generalized weakness.  Also tells me that when she tries to ambulate she gets very dizzy.  Not much back pain but does have bilateral shoulder pain.  Also complains of bilateral lower extremity pain and soreness which is chronic.   ROS:  Gen- No fevers, chills  CV- No chest pain, palpitations  Resp- No cough, dyspnea  GI- No N/V/D, abd pain     Objective   Objective     Vital Signs:   Temp:  [96.6 °F (35.9 °C)-98.2 °F (36.8 °C)] 96.6 °F (35.9 °C)  Heart Rate:  [57-77] 65  Resp:  [17-20] 18  BP: ()/() 108/74  Flow (L/min):  [2] 2     Physical Exam:  Constitutional: No acute distress  HENT: NCAT, mucous membranes moist, ecchymosis around the eyes bilaterally  Respiratory: Clear to auscultation bilaterally, respiratory effort normal   Cardiovascular: RRR, no murmurs, rubs, or gallops  Gastrointestinal: Abdomen is obese.  Positive bowel sounds, soft, nontender, nondistended  Musculoskeletal:  bilateral ankle edema  Psychiatric: Appropriate affect, cooperative  Neurologic: Awake, alert, oriented x3, no focality appreciated, speech clear  Skin: No rashes    Results Reviewed:  LAB RESULTS:      Lab 22  0833 22  0819   WBC 3.93 5.58   HEMOGLOBIN 12.4 15.5   HEMATOCRIT 36.1 46.3   PLATELETS 114* 148   NEUTROS ABS 1.99 3.04   IMMATURE GRANS (ABS) 0.01 0.02   LYMPHS ABS 1.27 1.95   MONOS ABS 0.43 0.35   EOS ABS 0.17 0.15   MCV 97.6* 97.7*   SED RATE  --  8         Lab 22  0833 22  0819   SODIUM 144 136   POTASSIUM 3.8 3.5   CHLORIDE 106 97*   CO2 30.0* 20.0*   ANION GAP 8.0 19.0*   BUN 23 20   CREATININE 0.91 1.02*   EGFR 63.9 55.7*   GLUCOSE 92 97   CALCIUM 9.6 10.3   MAGNESIUM   --  1.7   TSH  --  7.780*         Lab 08/04/22  0819   TOTAL PROTEIN 7.8   ALBUMIN 4.20   GLOBULIN 3.6   ALT (SGPT) 10   AST (SGOT) 20   BILIRUBIN 1.5*   ALK PHOS 109         Lab 08/04/22  0819   PROBNP 1,238.0   TROPONIN T <0.010                 Brief Urine Lab Results  (Last result in the past 365 days)      Color   Clarity   Blood   Leuk Est   Nitrite   Protein   CREAT   Urine HCG        08/05/22 1739 Yellow   Clear   Negative   Trace   Negative   Negative                 Microbiology Results Abnormal     Procedure Component Value - Date/Time    COVID-19 and FLU A/B PCR - Swab, Nasopharynx [[id number]]  (Normal) Collected: 08/04/22 0912    Lab Status: Final result Specimen: Swab from Nasopharynx Updated: 08/04/22 1006     COVID19 Not Detected     Influenza A PCR Not Detected     Influenza B PCR Not Detected    Narrative:      Fact sheet for providers: https://www.fda.gov/media/424922/download    Fact sheet for patients: https://www.fda.gov/media/006637/download    Test performed by PCR.          MRI Lumbar Spine Without Contrast    Result Date: 8/5/2022  Examination: MRI LUMBAR SPINE WO CONTRAST-  Date of Exam: 8/5/2022 8:36 PM  Indication: fall, compression fx; W19.XXXA-Unspecified fall, initial encounter; Y92.009-Unspecified place in unspecified non-institutional (private) residence as the place of occurrence of the external cause; S80.02XA-Contusion of left knee, initial encounter; S80.12XA-Contusion of left lower leg, initial encounter; S80.01XA-Contusion of right knee, initial encounter; S80.11XA-Contusion of right lower leg, i.  Comparison: CT abdomen and pelvis without contrast 08/04/2022  Technique: Standard noncontrast MR pulse sequences of the lumbar spine were obtained.  Findings: There are 5 lumbar-type vertebral bodies. There is a mild superior endplate compression fracture at L4 with 30% height loss. There is a nondisplaced fracture at the inferior endplate of L5 without significant height loss. The  remaining lumbar vertebral bodies and visualized lower thoracic vertebral bodies are maintained in height. The conus medullaris terminates at the T12-L1 level. There is no paraspinal fluid collection. Kidneys are without hydronephrosis. The abdominal aorta is without aneurysm. Negative for marrow placement process.  T12-L1: There is no disc bulge. Mild facet hypertrophy. Negative for canal or foraminal stenosis.  L1-2: There is disc desiccation with mild bulging of the disc. Mild left facet arthritis. Negative for canal stenosis. Mild bilateral foraminal stenosis.  L2-3: There is mild bulging of the disc. Mild asymmetric left facet arthritis and ligament flavum thickening. Negative for canal stenosis. Mild bilateral foraminal stenosis.  L3-4: There is slight retropulsion of the superior endplate of L4 into the canal by 4 mm without significant central canal stenosis. Moderate bilateral facet arthritis. Negative for foraminal stenosis.  L4-5: There is no significant disc bulge. Moderate bilateral facet arthritis. Mild asymmetric left ligamentum flavum thickening. Negative for canal stenosis. No foraminal stenosis.  L5-S1: There is no significant disc bulge. Moderate bilateral facet arthritis. Negative for canal or foraminal stenosis.      Impression: 1. Mild acute/subacute 30% superior plate compression fracture at L4. Minimal retropulsion of the superior endplate into canal without significant canal stenosis. 2. Nondisplaced acute/subacute transverse fracture at inferior endplate of L5. 3. Multilevel degenerative findings above without high-grade canal or foraminal stenosis.  This report was finalized on 8/5/2022 11:20 PM by Alfonso Conde MD.        Results for orders placed during the hospital encounter of 02/20/19    Adult Transthoracic Echo Complete With Contrast if Necessary Per Protocol    Interpretation Summary  · Left ventricular systolic function is normal.  · Estimated EF appears to be in the range of 66 -  70%.  · Left ventricular diastolic dysfunction (grade II) consistent with pseudonormalization.  · Moderate tricuspid valve regurgitation is present.  · Left atrial cavity size is severely dilated.  · Calculated right ventricular systolic pressure from tricuspid regurgitation is 55 mmHg.      I have reviewed the medications:  Scheduled Meds:amiodarone, 200 mg, Oral, Q8H   Followed by  [START ON 8/13/2022] amiodarone, 200 mg, Oral, Q12H   Followed by  [START ON 8/27/2022] amiodarone, 200 mg, Oral, Daily  amitriptyline, 25 mg, Oral, Nightly  apixaban, 5 mg, Oral, Q12H  carvedilol, 3.125 mg, Oral, BID With Meals  donepezil, 10 mg, Oral, Daily  levothyroxine, 88 mcg, Oral, Daily  memantine, 10 mg, Oral, BID  sertraline, 100 mg, Oral, Daily  sodium chloride, 10 mL, Intravenous, Q12H      Continuous Infusions:lactated ringers, 125 mL/hr, Last Rate: 125 mL/hr (08/06/22 0403)      PRN Meds:.•  acetaminophen **OR** acetaminophen **OR** acetaminophen  •  ALPRAZolam  •  Morphine  •  ondansetron **OR** ondansetron  •  sodium chloride  •  sodium chloride    Assessment & Plan   Assessment & Plan     Active Hospital Problems    Diagnosis  POA   • Fall at home, initial encounter [W19.XXXA, Y92.009]  Not Applicable      Resolved Hospital Problems   No resolved problems to display.        Brief Hospital Course to date:  Evelyn Rogers is a 80 yo with PMH of HFpEF, Afib, Dementia, Lupus and anxiety that presented after a fall at home. She has had been having increased falls at home.     Multiple Falls at home  Debility   -Pt found down on the floor by EMS, lives at home and uses walker.  Had a fall  about 6 weeks ago and has been declining since.   -MRI shows acute/subacute superior plate compression fracture at L4.  Also shows nondisplaced acute/subacute transverse fracture at the inferior endplate of L5.  This study also shows multilevel degenerative disease.    PAF  -Paroxysmal atrial fibrillation and patient was on amiodarone before  which was discontinued by cardiology.  -Will start amio protocol and obtain records from Dr. Gordon. Continue Eliquis.    Cognitive Decline/Dementia  -CT head no acute finding but chronic small vessel ischemia/white matter changes  -cont namenda and aricept     CAD  HFpEF  HTN  -She is currently slightly hypotensive which may also be contributing to her dizziness/weakness. Note that she is on prn Bumex at home which we are holding.      Hypothyroidism  -synthroid     Anxiety  -prn xanax    Expected Discharge Location and Transportation: SNF  Expected Discharge Date: 8/8    DVT prophylaxis:  Medical DVT prophylaxis orders are present.     AM-PAC 6 Clicks Score (PT): 15 (08/05/22 1202)    CODE STATUS:   Code Status and Medical Interventions:   Ordered at: 08/04/22 2418     Level Of Support Discussed With:    Patient     Code Status (Patient has no pulse and is not breathing):    CPR (Attempt to Resuscitate)     Medical Interventions (Patient has pulse or is breathing):    Full Support       Davi Mojica MD  08/06/22

## 2022-08-06 NOTE — THERAPY EVALUATION
Patient Name: Evelyn Rogers  : 1940    MRN: [id number]                              Today's Date: 2022       Admit Date: 2022    Visit Dx:     ICD-10-CM ICD-9-CM   1. Fall at home, initial encounter  W19.XXXA E888.9    Y92.009 E849.0   2. Contusion of left knee and lower leg, initial encounter  S80.02XA 924.10    S80.12XA 924.11   3. Contusion of right knee and lower leg, initial encounter  S80.01XA 924.10    S80.11XA 924.11   4. Cervical sprain, initial encounter  S13.9XXA 847.0   5. Compression fx, lumbar spine, closed, initial encounter ([facility])  S32.000A 805.4   6. Decreased ambulation status  Z74.09 780.99     Patient Active Problem List   Diagnosis   • CAP (community acquired pneumonia)   • Acute on chronic congestive heart failure (HCC)   • A-fib ([facility])   • Pleural effusion, bilateral   • Pericardial effusion   • CAD (coronary artery disease)   • Essential hypertension   • Liver nodule   • Bacteriuria   • Fall at home, initial encounter     Past Medical History:   Diagnosis Date   • CAD (coronary artery disease)    • CHF (congestive heart failure) ([facility])    • Hypertension      History reviewed. No pertinent surgical history.   General Information     Row Name 2244          OT Time and Intention    Document Type evaluation  -MR     Mode of Treatment occupational therapy  -MR     Row Name 22          General Information    Patient Profile Reviewed yes  -MR     Prior Level of Function independent:;bed mobility;ADL's;all household mobility;gait;transfer  -MR     Existing Precautions/Restrictions fall;spinal  -MR     Barriers to Rehab medically complex;previous functional deficit  -MR     Row Name 22          Living Environment    People in Home alone;other (see comments)  SonWillie resides on property - patient resides alone in an apartment above the garage  -MR     Row Name 22          Home Main Entrance    Number of Stairs, Main Entrance other (see comments)   Chair lift to access apartment over garage  -MR     Row Name 08/06/22 0944          Cognition    Orientation Status (Cognition) oriented x 3  -MR     Row Name 08/06/22 0944          Safety Issues, Functional Mobility    Safety Issues Affecting Function (Mobility) safety precaution awareness;insight into deficits/self-awareness;awareness of need for assistance;safety precautions follow-through/compliance  -MR     Impairments Affecting Function (Mobility) balance;endurance/activity tolerance;shortness of breath;strength  -MR           User Key  (r) = Recorded By, (t) = Taken By, (c) = Cosigned By    Initials Name Provider Type     Michelle Gomes OT Occupational Therapist                 Mobility/ADL's     Row Name 08/06/22 0946          Bed Mobility    Comment, (Bed Mobility) Pt deferred all EOB/OOB d/t all over pain and fatigue. Pt reporting that she would get to the chair later this date.  -     Row Name 08/06/22 0946          Transfers    Comment, (Transfers) Pt deferred all EOB/OOB d/t all over pain and fatigue. Pt reporting that she would get to the chair later this date.  -MR     Row Name 08/06/22 0946          Activities of Daily Living    BADL Assessment/Intervention grooming;lower body dressing  -MR     Row Name 08/06/22 0946          Grooming Assessment/Training    [place] Level (Grooming) wash face, hands;set up  -MR     Position (Grooming) sitting up in bed  -MR     Row Name 08/06/22 0946          Lower Body Dressing Assessment/Training    [place] Level (Lower Body Dressing) don;socks;dependent (less than 25% patient effort)  -MR     Position (Lower Body Dressing) supine  -MR           User Key  (r) = Recorded By, (t) = Taken By, (c) = Cosigned By    Initials Name Provider Type     Eliseo , OT Occupational Therapist               Obj/Interventions     Row Name 08/06/22 0947          Sensory Assessment (Somatosensory)    Sensory Assessment (Somatosensory) UE sensation intact  -      Row Name 08/06/22 0947          Vision Assessment/Intervention    Visual Impairment/Limitations WFL  -MR     Row Name 08/06/22 0947          Range of Motion Comprehensive    General Range of Motion bilateral upper extremity ROM WFL  -MR     Row Name 08/06/22 0947          Strength Comprehensive (MMT)    Comment, General Manual Muscle Testing (MMT) Assessment Did not formally test d/t pain reporting all over pain/soreness  -MR           User Key  (r) = Recorded By, (t) = Taken By, (c) = Cosigned By    Initials Name Provider Type    Michelle Bang, OT Occupational Therapist               Goals/Plan     Row Name 08/06/22 0952          Bed Mobility Goal 1 (OT)    Activity/Assistive Device (Bed Mobility Goal 1, OT) rolling to left;rolling to right;sidelying to sit/sit to sidelying  -MR     [place] Level/Cues Needed (Bed Mobility Goal 1, OT) minimum assist (75% or more patient effort);tactile cues required;verbal cues required  -MR     Time Frame (Bed Mobility Goal 1, OT) long term goal (LTG);by discharge  -     Row Name 08/06/22 0952          Transfer Goal 1 (OT)    Activity/Assistive Device (Transfer Goal 1, OT) bed-to-chair/chair-to-bed  -MR     [place] Level/Cues Needed (Transfer Goal 1, OT) minimum assist (75% or more patient effort);tactile cues required;verbal cues required  -MR     Time Frame (Transfer Goal 1, OT) long term goal (LTG);by discharge  -MR     Progress/Outcome (Transfer Goal 1, OT) new goal  -     Row Name 08/06/22 0952          Dressing Goal 1 (OT)    Activity/Device (Dressing Goal 1, OT) lower body dressing;long-handled shoe horn;reacher;sock-aid;zipper pull  -MR     [place]/Cues Needed (Dressing Goal 1, OT) minimum assist (75% or more patient effort);tactile cues required;verbal cues required  -MR     Time Frame (Dressing Goal 1, OT) long term goal (LTG);by discharge  -MR     Progress/Outcome (Dressing Goal 1, OT) new goal  -     Row Name 08/06/22 0952          Therapy  Assessment/Plan (OT)    Planned Therapy Interventions (OT) activity tolerance training;adaptive equipment training;BADL retraining;functional balance retraining;IADL retraining;occupation/activity based interventions;patient/caregiver education/training;transfer/mobility retraining;strengthening exercise;ROM/therapeutic exercise  -MR           User Key  (r) = Recorded By, (t) = Taken By, (c) = Cosigned By    Initials Name Provider Type    MR Eliseo , OT Occupational Therapist               Clinical Impression     Row Name 08/06/22 0948          Pain Assessment    Pretreatment Pain Rating 7/10  -MR     Posttreatment Pain Rating 7/10  -MR     Pain Location generalized  -MR     Pain Location - other (see comments)  all over. Mainly legs and shoulders  -MR     Pre/Posttreatment Pain Comment RN present during pain report and provided pain medication  -MR     Pain Intervention(s) Ambulation/increased activity;Repositioned  -MR     Row Name 08/06/22 0948          Plan of Care Review    Plan of Care Reviewed With patient  -MR     Progress no change  Initial Eval  -MR     Outcome Evaluation OT initial eval completed. Pt presenting w/ generalized weakness, decreased activity tolerance/endurance, impaired balance/transfers/mobility and increased need for assist w/ ADLs following a fall at home. Pt deferred all OOB/EOB d/t all over pain and fatigue. SUA for grooming tasks and DEP for LB dressing. If pt is limited in future sessions by back pain she would benefit from AE for increased I w/ LB dressing, to be assessed further when pain is under control. Recommend SNF upon d/c for best functional outcome.  -MR     Row Name 08/06/22 0948          Therapy Assessment/Plan (OT)    Patient/Family Therapy Goal Statement (OT) Return to PLOF  -MR     Rehab Potential (OT) fair, will monitor progress closely  -MR     Criteria for Skilled Therapeutic Interventions Met (OT) yes;meets criteria;skilled treatment is necessary  -MR      Therapy Frequency (OT) daily  -MR     Row Name 08/06/22 0948          Therapy Plan Review/Discharge Plan (OT)    Anticipated Discharge Disposition (OT) skilled nursing facility  -MR     Row Name 08/06/22 0948          Vital Signs    Pre Systolic BP Rehab 114  -MR     Pre Treatment Diastolic BP 73  -MR     Pretreatment Heart Rate (beats/min) 61  -MR     Posttreatment Heart Rate (beats/min) 65  -MR     Pre SpO2 (%) 100  -MR     O2 Delivery Pre Treatment room air  -MR     O2 Delivery Intra Treatment room air  -MR     O2 Delivery Post Treatment room air  -MR     Pre Patient Position Supine  -MR     Intra Patient Position Supine  -MR     Post Patient Position Supine  -MR     Row Name 08/06/22 0948          Positioning and Restraints    Pre-Treatment Position in bed  -MR     Post Treatment Position bed  -MR     In Bed supine;notified nsg;call light within reach;encouraged to call for assist;exit alarm on;side rails up x2  -MR           User Key  (r) = Recorded By, (t) = Taken By, (c) = Cosigned By    Initials Name Provider Type    Michelle Bang OT Occupational Therapist               Outcome Measures     Row Name 08/06/22 0953          How much help from another is currently needed...    Putting on and taking off regular lower body clothing? 1  -MR     Bathing (including washing, rinsing, and drying) 2  -MR     Toileting (which includes using toilet bed pan or urinal) 2  -MR     Putting on and taking off regular upper body clothing 3  -MR     Taking care of personal grooming (such as brushing teeth) 3  -MR     Eating meals 4  -MR     AM-PAC 6 Clicks Score (OT) 15  -MR     Row Name 08/06/22 0953          Functional Assessment    Outcome Measure Options AM-PAC 6 Clicks Daily Activity (OT)  -MR           User Key  (r) = Recorded By, (t) = Taken By, (c) = Cosigned By    Initials Name Provider Type    Michelle Bang OT Occupational Therapist                Occupational Therapy Education                 Title: PT OT  SLP Therapies (In Progress)     Topic: Occupational Therapy (Done)     Point: ADL training (Done)     Description:   Instruct learner(s) on proper safety adaptation and remediation techniques during self care or transfers.   Instruct in proper use of assistive devices.              Learning Progress Summary           Patient Acceptance, E, VU,NR by MR at 8/6/2022 0953                   Point: Home exercise program (Done)     Description:   Instruct learner(s) on appropriate technique for monitoring, assisting and/or progressing therapeutic exercises/activities.              Learning Progress Summary           Patient Acceptance, E, VU,NR by MR at 8/6/2022 0953                   Point: Precautions (Done)     Description:   Instruct learner(s) on prescribed precautions during self-care and functional transfers.              Learning Progress Summary           Patient Acceptance, E, VU,NR by MR at 8/6/2022 0953                   Point: Body mechanics (Done)     Description:   Instruct learner(s) on proper positioning and spine alignment during self-care, functional mobility activities and/or exercises.              Learning Progress Summary           Patient Acceptance, E, VU,NR by MR at 8/6/2022 0953                               User Key     Initials Effective Dates Name Provider Type Discipline     10/06/21 -  Michelle Gomes OT Occupational Therapist OT              OT Recommendation and Plan  Planned Therapy Interventions (OT): activity tolerance training, adaptive equipment training, BADL retraining, functional balance retraining, IADL retraining, occupation/activity based interventions, patient/caregiver education/training, transfer/mobility retraining, strengthening exercise, ROM/therapeutic exercise  Therapy Frequency (OT): daily  Plan of Care Review  Plan of Care Reviewed With: patient  Progress: no change (Initial Eval)  Outcome Evaluation: OT initial eval completed. Pt presenting w/ generalized weakness,  decreased activity tolerance/endurance, impaired balance/transfers/mobility and increased need for assist w/ ADLs following a fall at home. Pt deferred all OOB/EOB d/t all over pain and fatigue. SUA for grooming tasks and DEP for LB dressing. If pt is limited in future sessions by back pain she would benefit from AE for increased I w/ LB dressing, to be assessed further when pain is under control. Recommend SNF upon d/c for best functional outcome.     Time Calculation:    Time Calculation- OT     Row Name 08/06/22 0954             Time Calculation- OT    OT Start Time 0830  -MR      OT Received On 08/06/22  -MR      OT Goal Re-Cert Due Date 08/16/22  -MR              Untimed Charges    OT Eval/Re-eval Minutes 31  -MR              Total Minutes    Untimed Charges Total Minutes 31  -MR       Total Minutes 31  -MR            User Key  (r) = Recorded By, (t) = Taken By, (c) = Cosigned By    Initials Name Provider Type    Toma Bang OT Occupational Therapist              Therapy Charges for Today     Code Description Service Date Service Provider Modifiers Qty    [id number]  OT EVAL LOW COMPLEXITY 3 8/6/2022 Toma Gomes OT GO 1               TOMA GOMES OT  8/6/2022

## 2022-08-07 ENCOUNTER — APPOINTMENT (OUTPATIENT)
Dept: CARDIOLOGY | Facility: HOSPITAL | Age: 82
End: 2022-08-07

## 2022-08-07 LAB
BH CV ECHO MEAS - AO ROOT DIAM: 3.1 CM
BH CV ECHO MEAS - EDV(CUBED): 79.5 ML
BH CV ECHO MEAS - EDV(MOD-SP2): 69.8 ML
BH CV ECHO MEAS - EDV(MOD-SP4): 79.1 ML
BH CV ECHO MEAS - EF(MOD-BP): 47.6 %
BH CV ECHO MEAS - EF(MOD-SP2): 47.1 %
BH CV ECHO MEAS - EF(MOD-SP4): 50.2 %
BH CV ECHO MEAS - ESV(CUBED): 29.8 ML
BH CV ECHO MEAS - ESV(MOD-SP2): 36.9 ML
BH CV ECHO MEAS - ESV(MOD-SP4): 39.4 ML
BH CV ECHO MEAS - FS: 27.9 %
BH CV ECHO MEAS - IVS/LVPW: 1.11 CM
BH CV ECHO MEAS - IVSD: 1 CM
BH CV ECHO MEAS - LA DIMENSION: 4.7 CM
BH CV ECHO MEAS - LAT PEAK E' VEL: 15.4 CM/SEC
BH CV ECHO MEAS - LV MASS(C)D: 132.7 GRAMS
BH CV ECHO MEAS - LV MAX PG: 2.37 MMHG
BH CV ECHO MEAS - LV MEAN PG: 1 MMHG
BH CV ECHO MEAS - LV V1 MAX: 77 CM/SEC
BH CV ECHO MEAS - LV V1 VTI: 14.8 CM
BH CV ECHO MEAS - LVIDD: 4.3 CM
BH CV ECHO MEAS - LVIDS: 3.1 CM
BH CV ECHO MEAS - LVOT AREA: 2.8 CM2
BH CV ECHO MEAS - LVOT DIAM: 1.9 CM
BH CV ECHO MEAS - LVPWD: 0.9 CM
BH CV ECHO MEAS - MED PEAK E' VEL: 11.9 CM/SEC
BH CV ECHO MEAS - MV DEC SLOPE: 456 CM/SEC2
BH CV ECHO MEAS - MV E MAX VEL: 98.5 CM/SEC
BH CV ECHO MEAS - MV MAX PG: 5.8 MMHG
BH CV ECHO MEAS - MV MEAN PG: 2 MMHG
BH CV ECHO MEAS - MV P1/2T: 73.9 MSEC
BH CV ECHO MEAS - MV V2 VTI: 32.9 CM
BH CV ECHO MEAS - MVA(P1/2T): 3 CM2
BH CV ECHO MEAS - MVA(VTI): 1.28 CM2
BH CV ECHO MEAS - RAP SYSTOLE: 15 MMHG
BH CV ECHO MEAS - RVSP: 52 MMHG
BH CV ECHO MEAS - SV(LVOT): 42 ML
BH CV ECHO MEAS - SV(MOD-SP2): 32.9 ML
BH CV ECHO MEAS - SV(MOD-SP4): 39.7 ML
BH CV ECHO MEAS - TAPSE (>1.6): 2.49 CM
BH CV ECHO MEAS - TR MAX PG: 37 MMHG
BH CV ECHO MEAS - TR MAX VEL: 303 CM/SEC
BH CV ECHO MEASUREMENTS AVERAGE E/E' RATIO: 7.22
BH CV VAS BP LEFT ARM: NORMAL MMHG
BH CV XLRA - RV BASE: 5.2 CM
BH CV XLRA - RV LENGTH: 6.3 CM
BH CV XLRA - RV MID: 4.1 CM
BH CV XLRA - TDI S': 15.9 CM/SEC
LEFT ATRIUM VOLUME INDEX: 40.3 ML/M2
MAXIMAL PREDICTED HEART RATE: 140 BPM
STRESS TARGET HR: 119 BPM

## 2022-08-07 PROCEDURE — 99222 1ST HOSP IP/OBS MODERATE 55: CPT | Performed by: INTERNAL MEDICINE

## 2022-08-07 PROCEDURE — 25010000002 MORPHINE PER 10 MG: Performed by: INTERNAL MEDICINE

## 2022-08-07 PROCEDURE — 25010000002 ONDANSETRON PER 1 MG: Performed by: FAMILY MEDICINE

## 2022-08-07 PROCEDURE — 93306 TTE W/DOPPLER COMPLETE: CPT | Performed by: INTERNAL MEDICINE

## 2022-08-07 PROCEDURE — 93010 ELECTROCARDIOGRAM REPORT: CPT | Performed by: INTERNAL MEDICINE

## 2022-08-07 PROCEDURE — 93005 ELECTROCARDIOGRAM TRACING: CPT | Performed by: INTERNAL MEDICINE

## 2022-08-07 PROCEDURE — 93306 TTE W/DOPPLER COMPLETE: CPT

## 2022-08-07 PROCEDURE — 99233 SBSQ HOSP IP/OBS HIGH 50: CPT | Performed by: INTERNAL MEDICINE

## 2022-08-07 RX ADMIN — MORPHINE SULFATE 2 MG: 2 INJECTION, SOLUTION INTRAMUSCULAR; INTRAVENOUS at 17:50

## 2022-08-07 RX ADMIN — LEVOTHYROXINE SODIUM 88 MCG: 88 TABLET ORAL at 09:05

## 2022-08-07 RX ADMIN — ALPRAZOLAM 0.5 MG: 0.5 TABLET ORAL at 21:59

## 2022-08-07 RX ADMIN — Medication 10 ML: at 09:08

## 2022-08-07 RX ADMIN — ACETAMINOPHEN 325MG 650 MG: 325 TABLET ORAL at 20:23

## 2022-08-07 RX ADMIN — MEMANTINE 10 MG: 10 TABLET ORAL at 20:12

## 2022-08-07 RX ADMIN — APIXABAN 5 MG: 5 TABLET, FILM COATED ORAL at 20:12

## 2022-08-07 RX ADMIN — CARVEDILOL 3.12 MG: 3.12 TABLET, FILM COATED ORAL at 09:05

## 2022-08-07 RX ADMIN — APIXABAN 5 MG: 5 TABLET, FILM COATED ORAL at 09:05

## 2022-08-07 RX ADMIN — AMIODARONE HYDROCHLORIDE 200 MG: 200 TABLET ORAL at 02:21

## 2022-08-07 RX ADMIN — Medication 10 ML: at 20:16

## 2022-08-07 RX ADMIN — CARVEDILOL 3.12 MG: 3.12 TABLET, FILM COATED ORAL at 17:51

## 2022-08-07 RX ADMIN — AMIODARONE HYDROCHLORIDE 200 MG: 200 TABLET ORAL at 09:35

## 2022-08-07 RX ADMIN — DONEPEZIL HYDROCHLORIDE 10 MG: 5 TABLET ORAL at 09:06

## 2022-08-07 RX ADMIN — MEMANTINE 10 MG: 10 TABLET ORAL at 09:05

## 2022-08-07 RX ADMIN — AMITRIPTYLINE HYDROCHLORIDE 25 MG: 25 TABLET, FILM COATED ORAL at 20:12

## 2022-08-07 RX ADMIN — ONDANSETRON 4 MG: 2 INJECTION INTRAMUSCULAR; INTRAVENOUS at 09:01

## 2022-08-07 RX ADMIN — SERTRALINE 100 MG: 100 TABLET, FILM COATED ORAL at 09:05

## 2022-08-07 NOTE — CONSULTS
Cardiology Consult    Evelyn Rogers  1940  [phone]  There is no work phone number on file.    08/07/22    DATE OF ADMISSION: 8/4/2022  [address]    Gerry Thorpe MD  [address] [facility] [id number]  Referring Provider: No ref. provider found     Chief Complaint   Patient presents with   • Weakness - Generalized   • Fall   Reason for Consult: PAF    Past Medical History:   Diagnosis Date   • CAD (coronary artery disease)    • CHF (congestive heart failure) (HCC)    • Hypertension      Patient Active Problem List    Diagnosis    • Fall at home, initial encounter [W19.XXXA, Y92.009]    • CAP (community acquired pneumonia) [J18.9]    • Acute on chronic congestive heart failure (HCC) [I50.9]    • A-fib (HCC) [I48.91]    • Pleural effusion, bilateral [J90]    • Pericardial effusion [I31.3]    • CAD (coronary artery disease) [I25.10]    • Essential hypertension [I10]    • Liver nodule [K76.89]    • Bacteriuria [R82.71]          History of Present Illness:   Evelyn Rogers is an 81-year-old white female with history of paroxysmal atrial fibrillation, hypertension, diastolic congestive heart failure, dyslipidemia, lupus, anxiety, and arthritis who was admitted to [facility] on 8/4/2022 after a fall at home.  Cardiology is asked to see the patient today for atrial fibrillation.  Patient was seen by Dr. Lee in our practice last in 2019.  At that time she had atrial fibrillation and was started on amiodarone.  She was also on Coumadin at that time.  Since then she states she has been following with Kusum Mata for cardiology.  She was not on amiodarone upon admission here which was reportedly records from her other cardiologist or anythingstopped at some point.  She has been in atrial fibrillation since admission here.  Most the time, heart rates have been controlled.  She did briefly have some atrial fibrillation with RVR.  However today her rates are controlled.  She is not  entirely aware of her home medications.  She is not sure that she feels her atrial fibrillation.  Currently she denies any chest pain or shortness of breath.  She has reportedly been having increased falls at home.    She was recently found down on the floor by EMS and had an MRI showing acute/subacute superior plate compression fracture at L4.  She also has a nondisplaced acute/subacute transverse fracture of the inferior endplate of L5.  She also has multilevel degenerative disease.  She has been taking Eliquis at home.    Allergies   Allergen Reactions   • Other Unknown (See Comments)     SOMETHING THAT SHE GOT DURING AN MRI       Prior to Admission Medications     Prescriptions Last Dose Informant Patient Reported? Taking?    ALPRAZolam (XANAX) 0.5 MG tablet 8/3/2022  Yes Yes    Take 0.5 mg by mouth Daily As Needed for Anxiety.    amitriptyline (ELAVIL) 25 MG tablet 8/3/2022  Yes Yes    Take 1 tablet by mouth every night at bedtime.    apixaban (ELIQUIS) 5 MG tablet tablet 8/3/2022  Yes Yes    Take 1 tablet by mouth Every 12 (Twelve) Hours.    baclofen (LIORESAL) 10 MG tablet 8/3/2022  Yes Yes    Take 10 mg by mouth 3 (Three) Times a Day.    bumetanide (BUMEX) 1 MG tablet 8/3/2022  Yes Yes    Take 1 tablet by mouth 2 (Two) Times a Day As Needed.    diphenhydrAMINE-acetaminophen (TYLENOL PM)  MG tablet per tablet 8/3/2022  Yes Yes    Take 1 tablet by mouth At Night As Needed.    donepezil (ARICEPT) 10 MG tablet 8/3/2022  Yes Yes    Take 1 tablet by mouth Every Morning.    levothyroxine (SYNTHROID, LEVOTHROID) 88 MCG tablet 8/3/2022  Yes Yes    Take 1 tablet by mouth Daily.    memantine (NAMENDA) 10 MG tablet 8/3/2022  Yes Yes    Take 1 tablet by mouth 2 (Two) Times a Day.    nystatin (MYCOSTATIN) 282868 UNIT/GM powder 8/3/2022  Yes Yes    Apply 1 application topically to the appropriate area as directed 2 (Two) Times a Day As Needed.    potassium chloride (K-DUR,KLOR-CON) 20 MEQ CR tablet 8/3/2022  Yes Yes     Take 1 tablet by mouth 2 (Two) Times a Day. Takes with Bumex    sertraline (ZOLOFT) 100 MG tablet 8/3/2022  Yes Yes    Take 1 tablet by mouth Daily.    alendronate (FOSAMAX) 70 MG tablet 2022  Yes No    Take 1 tablet by mouth Every 7 (Seven) Days.     cholecalciferol (VITAMIN D3) 1.25 MG (18899 UT) capsule 2022  Yes No    Take 1 capsule by mouth Every 7 (Seven) Days.             Current Facility-Administered Medications:   •  acetaminophen (TYLENOL) tablet 650 mg, 650 mg, Oral, Q4H PRN, 650 mg at 22 221 **OR** acetaminophen (TYLENOL) 160 MG/5ML solution 650 mg, 650 mg, Oral, Q4H PRN **OR** acetaminophen (TYLENOL) suppository 650 mg, 650 mg, Rectal, Q4H PRN, Ines Lorenz DO  •  ALPRAZolam (XANAX) tablet 0.5 mg, 0.5 mg, Oral, Daily PRN, Ines Lorenz DO, 0.5 mg at 22 2041  •  [COMPLETED] amiodarone in dextrose 5% (NEXTERONE) loading dose 150mg/100mL, 150 mg, Intravenous, Once, Stopped at 22 1034 **FOLLOWED BY** [] amiodarone 360 mg in 200 mL D5W infusion, 1 mg/min, Intravenous, Continuous, Stopped at 22 1630 **FOLLOWED BY** [] amiodarone 360 mg in 200 mL D5W infusion, 0.5 mg/min, Intravenous, Continuous, Last Rate: 16.67 mL/hr at 22 0403, 0.5 mg/min at 22 0403 **FOLLOWED BY** [COMPLETED] amiodarone (PACERONE) tablet 200 mg, 200 mg, Oral, Once, 200 mg at 22 1032 **FOLLOWED BY** amiodarone (PACERONE) tablet 200 mg, 200 mg, Oral, Q8H, 200 mg at 22 0935 **FOLLOWED BY** [START ON 2022] amiodarone (PACERONE) tablet 200 mg, 200 mg, Oral, Q12H **FOLLOWED BY** [START ON 2022] amiodarone (PACERONE) tablet 200 mg, 200 mg, Oral, Daily, Ni Murcia II, DO  •  amitriptyline (ELAVIL) tablet 25 mg, 25 mg, Oral, Nightly, Ines Lorenz DO, 25 mg at 22  •  apixaban (ELIQUIS) tablet 5 mg, 5 mg, Oral, Q12H, Ines Lorenz DO, 5 mg at 22 09  •   sennosides-docusate (PERICOLACE) 8.6-50 MG per tablet 2 tablet, 2 tablet, Oral, BID **AND** polyethylene glycol (MIRALAX) packet 17 g, 17 g, Oral, Daily PRN **AND** bisacodyl (DULCOLAX) EC tablet 5 mg, 5 mg, Oral, Daily PRN **AND** bisacodyl (DULCOLAX) suppository 10 mg, 10 mg, Rectal, Daily PRN, Nia Duncan APRN  •  carvedilol (COREG) tablet 3.125 mg, 3.125 mg, Oral, BID With Meals, Davi Mojica MD, 3.125 mg at 08/07/22 0905  •  donepezil (ARICEPT) tablet 10 mg, 10 mg, Oral, Daily, Ines Lorenz DO, 10 mg at 08/07/22 0906  •  levothyroxine (SYNTHROID, LEVOTHROID) tablet 88 mcg, 88 mcg, Oral, Daily, Ines Lorenz DO, 88 mcg at 08/07/22 0905  •  memantine (NAMENDA) tablet 10 mg, 10 mg, Oral, BID, Ines Lorenz DO, 10 mg at 08/07/22 0905  •  morphine injection 2 mg, 2 mg, Intravenous, Q4H PRN, Ni Murcia II, DO, 2 mg at 08/05/22 1706  •  ondansetron (ZOFRAN) tablet 4 mg, 4 mg, Oral, Q6H PRN **OR** ondansetron (ZOFRAN) injection 4 mg, 4 mg, Intravenous, Q6H PRN, Ines Lorenz DO, 4 mg at 08/07/22 0901  •  sertraline (ZOLOFT) tablet 100 mg, 100 mg, Oral, Daily, Ines Lorenz DO, 100 mg at 08/07/22 0905  •  sodium chloride 0.9 % flush 10 mL, 10 mL, Intravenous, PRN, Emergency, Triage Protocol, MD  •  sodium chloride 0.9 % flush 10 mL, 10 mL, Intravenous, Q12H, Ines Lorenz DO, 10 mL at 08/07/22 0908  •  sodium chloride 0.9 % flush 10 mL, 10 mL, Intravenous, PRN, Ines Lorenz DO    Social History     Socioeconomic History   • Marital status:    Tobacco Use   • Smoking status: Never Smoker   • Smokeless tobacco: Never Used   Substance and Sexual Activity   • Alcohol use: No   • Drug use: No   • Sexual activity: Defer       History reviewed. No pertinent family history.    REVIEW OF SYSTEMS:   CONSTITUTIONAL:         No weight loss, fever, chills, +weakness + fatigue.   HEENT:                             No visual loss, blurred vision, double vision, yellow sclerae.                                             No hearing loss, congestion, sore throat.   SKIN:                                No rashes, urticaria, ulcers, sores.     RESPIRATORY:               + shortness of breath, - hemoptysis, cough, sputum.   GI:                                     No anorexia, nausea, vomiting, diarrhea. No abdominal pain, melena.   :                                   No burning on urination, hematuria or increased frequency.  ENDOCRINE:                   No diaphoresis, cold or heat intolerance. No polyuria or polydipsia.   NEURO:                            No headache, + dizziness, - syncope, paralysis, ataxia, or parasthesias.                                            No change in bowel or bladder control. No history of CVA/TIA  MUSCULOSKELETAL:    No muscle, back pain, joint pain or stiffness.   HEMATOLOGY:               No anemia, bleeding, bruising. No history of DVT/PE.  PSYCH:                            No history of depression, anxiety    Vitals:    08/07/22 0905 08/07/22 0941 08/07/22 0945 08/07/22 1050   BP: 115/68 (!) 83/42 (!) 88/67 156/91   BP Location: Left arm   Left arm   Patient Position: Lying   Lying   Pulse: 60 69 69 62   Resp: 16   16   Temp: 97.8 °F (36.6 °C)   97.9 °F (36.6 °C)   TempSrc: Oral   Oral   SpO2: 96% 92% 93% 98%   Weight:       Height:             Vital Sign Min/Max for last 24 hours  Temp  Min: 96.8 °F (36 °C)  Max: 98.4 °F (36.9 °C)   BP  Min: 83/42  Max: 156/91   Pulse  Min: 53  Max: 75   Resp  Min: 16  Max: 18   SpO2  Min: 89 %  Max: 98 %   No data recorded      Intake/Output Summary (Last 24 hours) at 8/7/2022 1108  Last data filed at 8/7/2022 0900  Gross per 24 hour   Intake 100 ml   Output 700 ml   Net -600 ml             Physical Exam:  GEN: Well nourished, Well- developed  No acute distress  HEENT: Normocephalic, Atraumatic, PERRLA, moist mucous membranes  NECK: supple, NO JVD, no  thyromegaly, no lymphadenopathy  CARDIAC: S1S2, irr, irr,  no murmur, gallop, rub  LUNGS: Clear to ausculation, normal respiratory effort  ABDOMEN: Soft, nontender, normal bowel sounds  EXTREMITIES:No gross deformities,  No clubbing, cyanosis, + edema  SKIN: Warm, dry  NEURO: No focal deficits  PSYCHIATRIC: Normal affect and mood      I personally viewed and interpreted the patient's EKG/Telemetry/lab data    Data:   Results from last 7 days   Lab Units 22  0833 22  08   WBC 10*3/mm3 3.93 5.58   HEMOGLOBIN g/dL 12.4 15.5   HEMATOCRIT % 36.1 46.3   PLATELETS 10*3/mm3 114* 148     Results from last 7 days   Lab Units 22  0833 22  08   SODIUM mmol/L 144 136   POTASSIUM mmol/L 3.8 3.5   CHLORIDE mmol/L 106 97*   CO2 mmol/L 30.0* 20.0*   BUN mg/dL 23 20   CREATININE mg/dL 0.91 1.02*   GLUCOSE mg/dL 92 97          Results from last 7 days   Lab Units 22  08   TSH uIU/mL 7.780*   FREE T4 ng/dL 1.34     Results from last 7 days   Lab Units 22  0819   PROBNP pg/mL 1,238.0         Results from last 7 days   Lab Units 22  0819   CK TOTAL U/L 48   TROPONIN T ng/mL <0.010         Results from last 7 days   Lab Units 22  0819   PROBNP pg/mL 1,238.0       Intake/Output Summary (Last 24 hours) at 2022 1108  Last data filed at 2022 0900  Gross per 24 hour   Intake 100 ml   Output 700 ml   Net -600 ml       Chest X-Ray:  Imaging Results (Last 24 Hours)     ** No results found for the last 24 hours. **          Telemetry: Atrial fibrillation  EK2022 atrial fibrillation 68 bpm        Assessment and Plan:   1.  Persistent atrial fibrillation:  -Patient has been in atrial fibrillation since admission here, there is question that she is in persistent atrial fibrillation at baseline.  Her last admission here in 2019 she was in normal sinus rhythm on amiodarone, but at some point this was apparently stopped.  She has been following with Kusum Mata for cardiology.  We  will try to obtain those records.  -Amiodarone has been reinitiated here, however this may not be needed if she is in permanent atrial fibrillation.  Further recommendations to follow once records obtained from her cardiologist.  -CHADSVasc = 4 on Eliquis 5 mg twice daily.  However she may not be a good candidate for long-term anticoagulation due to increasing number of falls in the past few weeks.  Could consider watchman device implant  -We will follow-up on echocardiogram which has been ordered    2. Multiple Falls at home/Debility   -Pt found down on the floor by EMS, lives at home and uses walker.  Had a fall  about 6 weeks ago and has been declining since.   -MRI shows acute/subacute superior plate compression fracture at L4.  Also shows nondisplaced acute/subacute transverse fracture at the inferior endplate of L5.  This study also shows multilevel degenerative disease.  -Blood pressure has been low here which may be also contributing to her dizziness/weakness.  Holding Bumex    3.  History of CAD:  -No angina.  Defer ischemic evaluation at this time  -Normal stress test February 2019 with EF 77% and no evidence ischemia      Electronically signed by ETHAN Teague, 08/07/22, 11:08 AM EDT.      History and physical examination verified.  Agree with PAs note.    81-year-old white female with a history of PAF, pleural effusion, CAD, diastolic heart failure, hypertension, recurrent falls, community-acquired pneumonia, pleural effusions, and lupus who was admitted on 8/4/2022 secondary to falls.  Cardiology was asked to see the patient today for management of her atrial fibrillation, CAD, and diastolic heart failure.  She has been seen in the past by Dr. Lee in 2019.  At that time, she was on amiodarone therapy for atrial fibrillation.  Records after that are not available.  Upon admission she was not on amiodarone.  Overall speaking heart rates have been relatively controlled with rare RVR.  From a  clinical standpoint, she is unaware of atrial fibrillation.  She denies any chest pain, shortness of breath.  She is unsure whether her falls were due to instability versus other cause of the syncope.  After her fall, she was diagnosed with a compression fracture at L4/L5.  In addition, she has bilateral hematomas on her eyes.  She is on Eliquis at home due to her elevated Akash Vasc Score of 6.    Review of systems/family history/social history as per PAs note.    Physical Exam Temperature 97.9 pulse 70 respiratory rate 16 blood pressure 134/91 O2 saturation room air 97%  Constitutional: oriented to person, place, and time.  well-developed and well-nourished. No distress.   HENT: Normocephalic.   Eyes: Conjunctivae are normal. No scleral icterus.   Neck: Normal carotid pulses, no hepatojugular reflux and no JVD present. Carotid bruit is not present. No tracheal deviation, no edema and no erythema present. No thyromegaly present.   Cardiovascular: Irregular rate and rhythm normal S1 and S2.  No murmurs appreciated today.  Pulmonary/Chest: Clear to Auscultation bilaterally with no rales or wheezes. Resp effort NL  Abdominal: Soft. Bowel sounds are normal. no distension and no mass. There is no hepatosplenomegaly. There is no tenderness. There is no rebound and no guarding. No aortic pulsations.  Musculoskeletal:  exhibits no edema, tenderness or deformity.   Neurological: is alert and oriented to person, place, and time.  Rest is nonfocal.    Skin: Skin is warm and dry. No rash noted. No diaphoretic. No cyanosis or erythema. No pallor. Nails show no clubbing.   Psychiatric: Normal mood and affect.Speech is normal and behavior is normal.      Laboratory data as per PAs note.  Hematocrit 36, creatinine 0.9, TSH 7.78 with a T4 1.34, proBNP 1238    CT scan of the chest with cardiomegaly and small pericardial effusion, small bilateral pleural effusions    Chest x-ray; cardiomegaly no apparent disease noted    Twelve-lead  EKG: Probable atrial fibrillation 70 bpm incomplete right bundle branch block low voltage noted.    Last stress test with PET mild cardial perfusion 2/29/2019: LVEF 77% no ischemia.    Last echocardiogram 2/21/2019 LVEF 65 to 70%, moderate TR, diastolic dysfunction.    Impression/plan:    1.  Permanent atrial fibrillation with adequate ventricular rate control.  Would not pursue aggressive measures and would leave her in atrial fibrillation with a goal of therapy being rate control only.  Would discontinue amiodarone at this time.  Follow-up on echocardiogram performed today.    2.  [id number] score of 6: Continue Eliquis.  Will reassess whether she is a stable long-term oral anticoagulation as an outpatient due to her recurrent falls.  She may ultimately benefit from left atrial appendage occlusion with a watchman device.    3.  CAD: Defer ischemic evaluation at this time.    4.  Hypertension: Would not be aggressive in maintaining normal pressures.      I, Eris Mosher MD, personally performed the services face to face as described and documented by the above named individual. I have made any necessary edits and it is both accurate and complete 8/7/2022  12:06 EDT

## 2022-08-07 NOTE — PROGRESS NOTES
[facility] Medicine Services  PROGRESS NOTE    Patient Name: Evelyn Rogers  : 1940  MRN: [id number]    Date of Admission: 2022  Primary Care Physician: Gerry Thorpe MD    Subjective   Subjective     CC: f/u weakness    HPI:   Resting in bed in no acute distress but tells me that she feels poorly.  Complains of generalized weakness and lack of energy, pain all over particularly in her lower extremities and shoulders.  No fever or chills.      ROS:  Gen- No fevers, chills  CV- No chest pain, palpitations  Resp- No cough, dyspnea  GI- No N/V/D, abd pain     Objective   Objective     Vital Signs:   Temp:  [96.6 °F (35.9 °C)-98.4 °F (36.9 °C)] 97.8 °F (36.6 °C)  Heart Rate:  [53-75] 69  Resp:  [16-18] 16  BP: ()/(42-81) 88/67     Physical Exam:  Constitutional: No acute distress  HENT: NCAT, mucous membranes moist, ecchymosis around the eyes bilaterally  Respiratory: Clear to auscultation bilaterally, respiratory effort normal   Cardiovascular: RRR, no murmurs, rubs, or gallops  Gastrointestinal: Abdomen is obese.  Positive bowel sounds, soft, nontender, nondistended  Musculoskeletal:  bilateral ankle edema, lower extremities very tender to touch  Psychiatric: Flat affect, cooperative  Neurologic: Awake, alert, oriented x3, no focality appreciated, speech clear  Skin: No rashes    Results Reviewed:  LAB RESULTS:      Lab 22  0833 22  0819   WBC 3.93 5.58   HEMOGLOBIN 12.4 15.5   HEMATOCRIT 36.1 46.3   PLATELETS 114* 148   NEUTROS ABS 1.99 3.04   IMMATURE GRANS (ABS) 0.01 0.02   LYMPHS ABS 1.27 1.95   MONOS ABS 0.43 0.35   EOS ABS 0.17 0.15   MCV 97.6* 97.7*   SED RATE  --  8         Lab 22  0833 22  0819   SODIUM 144 136   POTASSIUM 3.8 3.5   CHLORIDE 106 97*   CO2 30.0* 20.0*   ANION GAP 8.0 19.0*   BUN 23 20   CREATININE 0.91 1.02*   EGFR 63.9 55.7*   GLUCOSE 92 97   CALCIUM 9.6 10.3   MAGNESIUM  --  1.7   TSH  --  7.780*         Lab 22  0819    TOTAL PROTEIN 7.8   ALBUMIN 4.20   GLOBULIN 3.6   ALT (SGPT) 10   AST (SGOT) 20   BILIRUBIN 1.5*   ALK PHOS 109         Lab 08/04/22  0819   PROBNP 1,238.0   TROPONIN T <0.010                 Brief Urine Lab Results  (Last result in the past 365 days)      Color   Clarity   Blood   Leuk Est   Nitrite   Protein   CREAT   Urine HCG        08/05/22 1739 Yellow   Clear   Negative   Trace   Negative   Negative                 Microbiology Results Abnormal     Procedure Component Value - Date/Time    COVID-19 and FLU A/B PCR - Swab, Nasopharynx [[id number]]  (Normal) Collected: 08/04/22 0912    Lab Status: Final result Specimen: Swab from Nasopharynx Updated: 08/04/22 1006     COVID19 Not Detected     Influenza A PCR Not Detected     Influenza B PCR Not Detected    Narrative:      Fact sheet for providers: https://www.fda.gov/media/487122/download    Fact sheet for patients: https://www.fda.gov/media/141057/download    Test performed by PCR.          MRI Lumbar Spine Without Contrast    Result Date: 8/5/2022  Examination: MRI LUMBAR SPINE WO CONTRAST-  Date of Exam: 8/5/2022 8:36 PM  Indication: fall, compression fx; W19.XXXA-Unspecified fall, initial encounter; Y92.009-Unspecified place in unspecified non-institutional (private) residence as the place of occurrence of the external cause; S80.02XA-Contusion of left knee, initial encounter; S80.12XA-Contusion of left lower leg, initial encounter; S80.01XA-Contusion of right knee, initial encounter; S80.11XA-Contusion of right lower leg, i.  Comparison: CT abdomen and pelvis without contrast 08/04/2022  Technique: Standard noncontrast MR pulse sequences of the lumbar spine were obtained.  Findings: There are 5 lumbar-type vertebral bodies. There is a mild superior endplate compression fracture at L4 with 30% height loss. There is a nondisplaced fracture at the inferior endplate of L5 without significant height loss. The remaining lumbar vertebral bodies and visualized  lower thoracic vertebral bodies are maintained in height. The conus medullaris terminates at the T12-L1 level. There is no paraspinal fluid collection. Kidneys are without hydronephrosis. The abdominal aorta is without aneurysm. Negative for marrow placement process.  T12-L1: There is no disc bulge. Mild facet hypertrophy. Negative for canal or foraminal stenosis.  L1-2: There is disc desiccation with mild bulging of the disc. Mild left facet arthritis. Negative for canal stenosis. Mild bilateral foraminal stenosis.  L2-3: There is mild bulging of the disc. Mild asymmetric left facet arthritis and ligament flavum thickening. Negative for canal stenosis. Mild bilateral foraminal stenosis.  L3-4: There is slight retropulsion of the superior endplate of L4 into the canal by 4 mm without significant central canal stenosis. Moderate bilateral facet arthritis. Negative for foraminal stenosis.  L4-5: There is no significant disc bulge. Moderate bilateral facet arthritis. Mild asymmetric left ligamentum flavum thickening. Negative for canal stenosis. No foraminal stenosis.  L5-S1: There is no significant disc bulge. Moderate bilateral facet arthritis. Negative for canal or foraminal stenosis.      Impression: 1. Mild acute/subacute 30% superior plate compression fracture at L4. Minimal retropulsion of the superior endplate into canal without significant canal stenosis. 2. Nondisplaced acute/subacute transverse fracture at inferior endplate of L5. 3. Multilevel degenerative findings above without high-grade canal or foraminal stenosis.  This report was finalized on 8/5/2022 11:20 PM by Alfonso Conde MD.        Results for orders placed during the hospital encounter of 02/20/19    Adult Transthoracic Echo Complete With Contrast if Necessary Per Protocol    Interpretation Summary  · Left ventricular systolic function is normal.  · Estimated EF appears to be in the range of 66 - 70%.  · Left ventricular diastolic dysfunction  (grade II) consistent with pseudonormalization.  · Moderate tricuspid valve regurgitation is present.  · Left atrial cavity size is severely dilated.  · Calculated right ventricular systolic pressure from tricuspid regurgitation is 55 mmHg.      I have reviewed the medications:  Scheduled Meds:amiodarone, 200 mg, Oral, Q8H   Followed by  [START ON 8/13/2022] amiodarone, 200 mg, Oral, Q12H   Followed by  [START ON 8/27/2022] amiodarone, 200 mg, Oral, Daily  amitriptyline, 25 mg, Oral, Nightly  apixaban, 5 mg, Oral, Q12H  carvedilol, 3.125 mg, Oral, BID With Meals  donepezil, 10 mg, Oral, Daily  levothyroxine, 88 mcg, Oral, Daily  memantine, 10 mg, Oral, BID  senna-docusate sodium, 2 tablet, Oral, BID  sertraline, 100 mg, Oral, Daily  sodium chloride, 10 mL, Intravenous, Q12H      Continuous Infusions:   PRN Meds:.•  acetaminophen **OR** acetaminophen **OR** acetaminophen  •  ALPRAZolam  •  senna-docusate sodium **AND** polyethylene glycol **AND** bisacodyl **AND** bisacodyl  •  Morphine  •  ondansetron **OR** ondansetron  •  sodium chloride  •  sodium chloride    Assessment & Plan   Assessment & Plan     Active Hospital Problems    Diagnosis  POA   • Fall at home, initial encounter [W19.XXXA, Y92.009]  Not Applicable      Resolved Hospital Problems   No resolved problems to display.        Brief Hospital Course to date:  Evelyn Rogers is a 80 yo with PMH of HFpEF, Afib, Dementia, Lupus and anxiety that presented after a fall at home. She has had been having increased falls at home.     Multiple Falls at home  Debility   -Pt found down on the floor by EMS, lives at home and uses walker.  Had a fall  about 6 weeks ago and has been declining since.   -MRI shows acute/subacute superior plate compression fracture at L4.  Also shows nondisplaced acute/subacute transverse fracture at the inferior endplate of L5.  This study also shows multilevel degenerative disease.    History of hypertension but currently blood pressure  is low  -Most likely this is contributing to weakness and also falls  -Patient also has history of diastolic dysfunction.  I talked to the son yesterday and currently patient does not follow with any cardiologist.    PAF  -Paroxysmal atrial fibrillation and patient was on amiodarone before which was discontinued.  -Amiodarone was restarted on admission    Diastolic dysfunction and also valvular heart disease with tricuspid regurg which was shown on the echocardiogram done on 2019  -We will get new echocardiogram  -We will ask cardiology to see the patient.    Cognitive Decline/Dementia  -CT head no acute finding but chronic small vessel ischemia/white matter changes  -cont namenda and aricept     CAD  HFpEF  HTN  -She is currently slightly hypotensive which may also be contributing to her dizziness/weakness. Note that she is on prn Bumex at home which we are holding.      Hypothyroidism  -synthroid     Anxiety  -prn xanax    Expected Discharge Location and Transportation: [facility]  Expected Discharge Date: 8/8    DVT prophylaxis:  Medical DVT prophylaxis orders are present.     AM-PAC 6 Clicks Score (PT): 12 (08/07/22 0905)    CODE STATUS:   Code Status and Medical Interventions:   Ordered at: 08/04/22 3301     Level Of Support Discussed With:    Patient     Code Status (Patient has no pulse and is not breathing):    CPR (Attempt to Resuscitate)     Medical Interventions (Patient has pulse or is breathing):    Full Support       Davi Mojica MD  08/07/22

## 2022-08-08 LAB
QT INTERVAL: 412 MS
QT INTERVAL: 434 MS
QT INTERVAL: 450 MS
QT INTERVAL: 474 MS
QTC INTERVAL: 436 MS
QTC INTERVAL: 450 MS
QTC INTERVAL: 460 MS
QTC INTERVAL: 504 MS

## 2022-08-08 PROCEDURE — 99232 SBSQ HOSP IP/OBS MODERATE 35: CPT | Performed by: INTERNAL MEDICINE

## 2022-08-08 PROCEDURE — 93010 ELECTROCARDIOGRAM REPORT: CPT | Performed by: INTERNAL MEDICINE

## 2022-08-08 PROCEDURE — 93005 ELECTROCARDIOGRAM TRACING: CPT | Performed by: INTERNAL MEDICINE

## 2022-08-08 RX ORDER — HYDROCODONE BITARTRATE AND ACETAMINOPHEN 5; 325 MG/1; MG/1
1 TABLET ORAL EVERY 6 HOURS PRN
Status: DISCONTINUED | OUTPATIENT
Start: 2022-08-08 | End: 2022-08-11 | Stop reason: HOSPADM

## 2022-08-08 RX ADMIN — MEMANTINE 10 MG: 10 TABLET ORAL at 08:20

## 2022-08-08 RX ADMIN — SERTRALINE 100 MG: 100 TABLET, FILM COATED ORAL at 08:20

## 2022-08-08 RX ADMIN — MEMANTINE 10 MG: 10 TABLET ORAL at 20:36

## 2022-08-08 RX ADMIN — APIXABAN 5 MG: 5 TABLET, FILM COATED ORAL at 08:20

## 2022-08-08 RX ADMIN — Medication 10 ML: at 08:21

## 2022-08-08 RX ADMIN — DONEPEZIL HYDROCHLORIDE 10 MG: 5 TABLET ORAL at 08:20

## 2022-08-08 RX ADMIN — Medication 10 ML: at 20:37

## 2022-08-08 RX ADMIN — LEVOTHYROXINE SODIUM 88 MCG: 88 TABLET ORAL at 08:21

## 2022-08-08 RX ADMIN — ALPRAZOLAM 0.5 MG: 0.5 TABLET ORAL at 22:21

## 2022-08-08 RX ADMIN — APIXABAN 5 MG: 5 TABLET, FILM COATED ORAL at 20:37

## 2022-08-08 RX ADMIN — AMITRIPTYLINE HYDROCHLORIDE 25 MG: 25 TABLET, FILM COATED ORAL at 20:37

## 2022-08-08 NOTE — CASE MANAGEMENT/SOCIAL WORK
Continued Stay Note  [facility]     Patient Name: Evelyn Rogers  MRN: [id number]  Today's Date: 8/8/2022    Admit Date: 8/4/2022     Discharge Plan     Row Name 08/08/22 1634       Plan    Plan Ongoing    Patient/Family in Agreement with Plan yes    Plan Comments Spoke with Katy, admissions liaison with [facility], and she will need updated therapy notes for insurance approval. Katy also stated that Ms. Rogers will need more active participation with therapy and take some steps. I attempted to speak with Ms. Rogers today and she was asleep so I did not bother her as therapy was not going to see her today. I will speak with her first thing in the morning to update her on [facility]'s requests. Case management will continue to follow.    Final Discharge Disposition Code 30 - still a patient               Discharge Codes    No documentation.               Expected Discharge Date and Time     Expected Discharge Date Expected Discharge Time    Aug 8, 2022             Grzegorz Watts RN

## 2022-08-08 NOTE — PROGRESS NOTES
[facility] Medicine Services  PROGRESS NOTE    Patient Name: Evelyn Rogers  : 1940  MRN: [id number]    Date of Admission: 2022  Primary Care Physician: Gerry Thorpe MD    Subjective   Subjective     CC: f/u weakness    HPI:   Resting in bed in no acute distress but tells me that she feels has pain all over.  Complains of generalized weakness. No fever or chills.      ROS:  Gen- No fevers, chills  CV- No chest pain, palpitations  Resp- No cough, dyspnea  GI- No N/V/D, abd pain     Objective   Objective     Vital Signs:   Temp:  [97.5 °F (36.4 °C)-98.8 °F (37.1 °C)] 97.5 °F (36.4 °C)  Heart Rate:  [53-74] 66  Resp:  [14-18] 18  BP: (100-154)/() 106/67  Flow (L/min):  [1] 1     Physical Exam:  Constitutional: No acute distress  HENT: NCAT, mucous membranes moist, ecchymosis around the eyes bilaterally  Respiratory: Clear to auscultation bilaterally, respiratory effort normal   Cardiovascular: RRR, no murmurs, rubs, or gallops  Gastrointestinal: Abdomen is obese.  Positive bowel sounds, soft, nontender, nondistended  Musculoskeletal:  bilateral ankle edema, lower extremities very tender to touch  Psychiatric: Flat affect, cooperative  Neurologic: Awake, alert, oriented x3, no focality appreciated, speech clear  Skin: No rashes    Results Reviewed:  LAB RESULTS:      Lab 22  0833 22  0819   WBC 3.93 5.58   HEMOGLOBIN 12.4 15.5   HEMATOCRIT 36.1 46.3   PLATELETS 114* 148   NEUTROS ABS 1.99 3.04   IMMATURE GRANS (ABS) 0.01 0.02   LYMPHS ABS 1.27 1.95   MONOS ABS 0.43 0.35   EOS ABS 0.17 0.15   MCV 97.6* 97.7*   SED RATE  --  8         Lab 22  0833 22  0819   SODIUM 144 136   POTASSIUM 3.8 3.5   CHLORIDE 106 97*   CO2 30.0* 20.0*   ANION GAP 8.0 19.0*   BUN 23 20   CREATININE 0.91 1.02*   EGFR 63.9 55.7*   GLUCOSE 92 97   CALCIUM 9.6 10.3   MAGNESIUM  --  1.7   TSH  --  7.780*         Lab 22  0819   TOTAL PROTEIN 7.8   ALBUMIN 4.20   GLOBULIN 3.6    ALT (SGPT) 10   AST (SGOT) 20   BILIRUBIN 1.5*   ALK PHOS 109         Lab 08/04/22  0819   PROBNP 1,238.0   TROPONIN T <0.010                 Brief Urine Lab Results  (Last result in the past 365 days)      Color   Clarity   Blood   Leuk Est   Nitrite   Protein   CREAT   Urine HCG        08/05/22 1739 Yellow   Clear   Negative   Trace   Negative   Negative                 Microbiology Results Abnormal     Procedure Component Value - Date/Time    COVID-19 and FLU A/B PCR - Swab, Nasopharynx [[id number]]  (Normal) Collected: 08/04/22 0912    Lab Status: Final result Specimen: Swab from Nasopharynx Updated: 08/04/22 1006     COVID19 Not Detected     Influenza A PCR Not Detected     Influenza B PCR Not Detected    Narrative:      Fact sheet for providers: https://www.fda.gov/media/520180/download    Fact sheet for patients: https://www.fda.gov/media/012862/download    Test performed by PCR.          Adult Transthoracic Echo Complete W/ Cont if Necessary Per Protocol    Result Date: 8/7/2022  · Left ventricular ejection fraction appears to be 56 - 60%. Left ventricular systolic function is low normal. · Left atrial volume is moderately increased. · The right atrial cavity is severely dilated. · There is mild calcification of the aortic valve. · Severe tricuspid valve regurgitation is present. · Estimated right ventricular systolic pressure from tricuspid regurgitation is moderately elevated (45-55 mmHg). · There is a moderate (1-2cm) pericardial effusion. There is no evidence of cardiac tamponade.        Results for orders placed during the hospital encounter of 08/04/22    Adult Transthoracic Echo Complete W/ Cont if Necessary Per Protocol    Interpretation Summary  · Left ventricular ejection fraction appears to be 56 - 60%. Left ventricular systolic function is low normal.  · Left atrial volume is moderately increased.  · The right atrial cavity is severely dilated.  · There is mild calcification of the aortic  valve.  · Severe tricuspid valve regurgitation is present.  · Estimated right ventricular systolic pressure from tricuspid regurgitation is moderately elevated (45-55 mmHg).  · There is a moderate (1-2cm) pericardial effusion. There is no evidence of cardiac tamponade.      I have reviewed the medications:  Scheduled Meds:amitriptyline, 25 mg, Oral, Nightly  apixaban, 5 mg, Oral, Q12H  carvedilol, 3.125 mg, Oral, BID With Meals  donepezil, 10 mg, Oral, Daily  levothyroxine, 88 mcg, Oral, Daily  memantine, 10 mg, Oral, BID  senna-docusate sodium, 2 tablet, Oral, BID  sertraline, 100 mg, Oral, Daily  sodium chloride, 10 mL, Intravenous, Q12H      Continuous Infusions:   PRN Meds:.•  acetaminophen **OR** acetaminophen **OR** acetaminophen  •  ALPRAZolam  •  senna-docusate sodium **AND** polyethylene glycol **AND** bisacodyl **AND** bisacodyl  •  HYDROcodone-acetaminophen  •  ondansetron **OR** ondansetron  •  sodium chloride  •  sodium chloride    Assessment & Plan   Assessment & Plan     Active Hospital Problems    Diagnosis  POA   • Fall at home, initial encounter [W19.XXXA, Y92.009]  Not Applicable      Resolved Hospital Problems   No resolved problems to display.        Brief Hospital Course to date:  Evelyn Rogers is a 80 yo with PMH of HFpEF, Afib, Dementia, Lupus and anxiety that presented after a fall at home. She has had been having increased falls at home.     Multiple Falls at home  Debility   -Pt found down on the floor by EMS, lives at home and uses walker.  Had a fall  about 6 weeks ago and has been declining since.   -MRI shows acute/subacute superior plate compression fracture at L4.  Also shows nondisplaced acute/subacute transverse fracture at the inferior endplate of L5.  This study also shows multilevel degenerative disease.    History of hypertension but currently blood pressure is low  -Most likely this is contributing to weakness and also falls  -Patient also has history of diastolic dysfunction.   I talked to the son yesterday and currently patient does not follow with any cardiologist.    PAF  -Cardiology saw and evaluated the patient today.  Appreciate their input.  -Paroxysmal atrial fibrillation and patient was on amiodarone before, which was discontinued recently.  -Amiodarone was restarted on admission but cardiology discontinued that.    Diastolic dysfunction and also valvular heart disease with tricuspid regurg which was shown on the echocardiogram done on 2019  -We will get new echocardiogram  -We will ask cardiology to see the patient.    Cognitive Decline/Dementia  -CT head no acute finding but chronic small vessel ischemia/white matter changes  -cont namenda and aricept     CAD  HFpEF  HTN  -She is currently slightly hypotensive which may also be contributing to her dizziness/weakness. Note that she is on prn Bumex at home which we are holding.      Hypothyroidism  -synthroid     Anxiety  -prn xanax    Expected Discharge Location and Transportation: [facility]  Expected Discharge Date: 8/8    DVT prophylaxis:  Medical DVT prophylaxis orders are present.     AM-PAC 6 Clicks Score (PT): 12 (08/08/22 0820)    CODE STATUS:   Code Status and Medical Interventions:   Ordered at: 08/04/22 4331     Level Of Support Discussed With:    Patient     Code Status (Patient has no pulse and is not breathing):    CPR (Attempt to Resuscitate)     Medical Interventions (Patient has pulse or is breathing):    Full Support       Davi Mojica MD  08/08/22

## 2022-08-08 NOTE — PROGRESS NOTES
"[facility] Progress Note     LOS: 3 days   Patient Care Team:  Gerry Thorpe MD as PCP - General (Family Medicine)  PCP:  Gerry Thorpe MD    Chief Complaint: Atrial fibrillation    SUBJECTIVE: Resting comfortably in bed.  Denies chest pain, palpitations or dyspnea.      Review of Systems:   All systems have been reviewed and are negative with the exception of those mentioned above.      OBJECTIVE:    Vital Sign Min/Max for last 24 hours  Temp  Min: 97.5 °F (36.4 °C)  Max: 97.7 °F (36.5 °C)   BP  Min: 100/55  Max: 131/84   Pulse  Min: 53  Max: 74   Resp  Min: 14  Max: 18   SpO2  Min: 87 %  Max: 100 %   No data recorded   No data recorded     Flowsheet Rows    Flowsheet Row First Filed Value   Admission Height 162.6 cm (64\") Documented at 08/04/2022 0807   Admission Weight 74.8 kg (165 lb) Documented at 08/04/2022 0807          Telemetry: Rate controlled atrial fibrillation      Intake/Output Summary (Last 24 hours) at 8/8/2022 1710  Last data filed at 8/8/2022 0335  Gross per 24 hour   Intake --   Output 500 ml   Net -500 ml     Intake & Output (last 3 days)       08/05 0701  08/06 0700 08/06 0701  08/07 0700 08/07 0701  08/08 0700 08/08 0701  08/09 0700    P.O.  100 0     Total Intake(mL/kg)  100 (1.4) 0 (0)     Urine (mL/kg/hr) 1900 (1.1) 700 (0.4) 500 (0.3)     Total Output 1900 700 500     Net -1900 -600 -500             Urine Unmeasured Occurrence 2 x 1 x 2 x     Stool Unmeasured Occurrence   1 x            Physical Exam:    General Appearance:    Alert, cooperative, no distress, appears stated age   Neck:   Supple, symmetrical, trachea midline.   Lungs:     Clear to auscultation bilaterally, respirations unlabored   Chest Wall:    No tenderness or deformity    Heart:   Irregularly irregular, S1 and S2 normal, no murmur, rub   or gallop, normal carotid impulse bilaterally without bruit.   Extremities:   Extremities normal, atraumatic, no cyanosis or edema "   Pulses:   2+ and symmetric all extremities   Skin:   Skin color, texture, turgor normal, no rashes or lesions      LABS/DIAGNOSTIC DATA:  Results from last 7 days   Lab Units 08/05/22  0833 08/04/22 0819   WBC 10*3/mm3 3.93 5.58   HEMOGLOBIN g/dL 12.4 15.5   HEMATOCRIT % 36.1 46.3   PLATELETS 10*3/mm3 114* 148     Lab Results   Lab Value Date/Time    TROPONINT <0.010 08/04/2022 0819         Results from last 7 days   Lab Units 08/05/22  0833 08/04/22  0819   SODIUM mmol/L 144 136   POTASSIUM mmol/L 3.8 3.5   CHLORIDE mmol/L 106 97*   CO2 mmol/L 30.0* 20.0*   BUN mg/dL 23 20   CREATININE mg/dL 0.91 1.02*   CALCIUM mg/dL 9.6 10.3   BILIRUBIN mg/dL  --  1.5*   ALK PHOS U/L  --  109   ALT (SGPT) U/L  --  10   AST (SGOT) U/L  --  20   GLUCOSE mg/dL 92 97             Results from last 7 days   Lab Units 08/04/22 0819   TSH uIU/mL 7.780*   FREE T4 ng/dL 1.34           Medication Review:   amitriptyline, 25 mg, Oral, Nightly  apixaban, 5 mg, Oral, Q12H  carvedilol, 3.125 mg, Oral, BID With Meals  donepezil, 10 mg, Oral, Daily  levothyroxine, 88 mcg, Oral, Daily  memantine, 10 mg, Oral, BID  senna-docusate sodium, 2 tablet, Oral, BID  sertraline, 100 mg, Oral, Daily  sodium chloride, 10 mL, Intravenous, Q12H             ASSESSMENT/PLAN:    Fall at home, initial encounter         1.  Permanent atrial fibrillation with adequate ventricular rate control.  Would not pursue aggressive measures and would leave her in atrial fibrillation with a goal of therapy being rate control only.    Not recommending antiarrhythmic therapy at this time.  Persistent borderline hypotension, transitioning to digoxin for rate control.    2.  [id number] score of 6: Continue Eliquis.  Will reassess whether she is a stable long-term oral anticoagulation as an outpatient due to her recurrent falls.  She may ultimately benefit from left atrial appendage occlusion with a watchman device.     3.  CAD: Defer ischemic evaluation at this time.    4.   Pulm hypertension, moderate: Associated severe tricuspid regurgitation and moderate pericardial effusion.  Normal LVEF.  Suspect secondary to chronic elevated left ventricular diastolic filling pressures.  Currently appears euvolemic and compensated.  Recommending further evaluation at this time.      Gerry Lee III, MD   08/08/22  17:10 EDT

## 2022-08-08 NOTE — PLAN OF CARE
Goal Outcome Evaluation:  VSS. Patient has had no complaints of pain today overall. Plan is [facility] rehab at discharge, still waiting to hear about placement. Will continue to monitor.

## 2022-08-09 PROCEDURE — 97116 GAIT TRAINING THERAPY: CPT

## 2022-08-09 PROCEDURE — 99222 1ST HOSP IP/OBS MODERATE 55: CPT | Performed by: PSYCHIATRY & NEUROLOGY

## 2022-08-09 PROCEDURE — 97530 THERAPEUTIC ACTIVITIES: CPT

## 2022-08-09 PROCEDURE — 99232 SBSQ HOSP IP/OBS MODERATE 35: CPT | Performed by: INTERNAL MEDICINE

## 2022-08-09 RX ADMIN — Medication 10 ML: at 08:26

## 2022-08-09 RX ADMIN — LEVOTHYROXINE SODIUM 88 MCG: 88 TABLET ORAL at 08:25

## 2022-08-09 RX ADMIN — DONEPEZIL HYDROCHLORIDE 10 MG: 5 TABLET ORAL at 08:25

## 2022-08-09 RX ADMIN — ALPRAZOLAM 0.5 MG: 0.5 TABLET ORAL at 23:59

## 2022-08-09 RX ADMIN — Medication 10 ML: at 21:07

## 2022-08-09 RX ADMIN — MEMANTINE 10 MG: 10 TABLET ORAL at 21:06

## 2022-08-09 RX ADMIN — SERTRALINE 100 MG: 100 TABLET, FILM COATED ORAL at 08:25

## 2022-08-09 RX ADMIN — AMITRIPTYLINE HYDROCHLORIDE 25 MG: 25 TABLET, FILM COATED ORAL at 21:06

## 2022-08-09 RX ADMIN — CARVEDILOL 3.12 MG: 3.12 TABLET, FILM COATED ORAL at 18:16

## 2022-08-09 RX ADMIN — SENNOSIDES AND DOCUSATE SODIUM 2 TABLET: 50; 8.6 TABLET ORAL at 21:06

## 2022-08-09 RX ADMIN — APIXABAN 5 MG: 5 TABLET, FILM COATED ORAL at 08:25

## 2022-08-09 RX ADMIN — APIXABAN 5 MG: 5 TABLET, FILM COATED ORAL at 21:06

## 2022-08-09 RX ADMIN — MEMANTINE 10 MG: 10 TABLET ORAL at 08:25

## 2022-08-09 NOTE — THERAPY TREATMENT NOTE
Patient Name: Evelyn Rogers  : 1940    MRN: [id number]                              Today's Date: 2022       Admit Date: 2022    Visit Dx:     ICD-10-CM ICD-9-CM   1. Fall at home, initial encounter  W19.XXXA E888.9    Y92.009 E849.0   2. Contusion of left knee and lower leg, initial encounter  S80.02XA 924.10    S80.12XA 924.11   3. Contusion of right knee and lower leg, initial encounter  S80.01XA 924.10    S80.11XA 924.11   4. Cervical sprain, initial encounter  S13.9XXA 847.0   5. Compression fx, lumbar spine, closed, initial encounter ([facility])  S32.000A 805.4   6. Decreased ambulation status  Z74.09 780.99     Patient Active Problem List   Diagnosis   • CAP (community acquired pneumonia)   • Acute on chronic congestive heart failure (HCC)   • A-fib ([facility])   • Pleural effusion, bilateral   • Pericardial effusion   • CAD (coronary artery disease)   • Essential hypertension   • Liver nodule   • Bacteriuria   • Fall at home, initial encounter     Past Medical History:   Diagnosis Date   • CAD (coronary artery disease)    • CHF (congestive heart failure) ([facility])    • Hypertension      History reviewed. No pertinent surgical history.   General Information     Row Name 22          Physical Therapy Time and Intention    Document Type therapy note (daily note)  -BA     Mode of Treatment physical therapy  -BA     Row Name 22          General Information    Patient Profile Reviewed yes  -BA     Existing Precautions/Restrictions fall;spinal;other (see comments);oxygen therapy device and L/min  acute/subacute L4 compression fx and L5 transverse process fx  -BA     Barriers to Rehab medically complex;previous functional deficit  -BA     Row Name 22          Cognition    Orientation Status (Cognition) oriented x 3  -BA     Row Name 22          Safety Issues, Functional Mobility    Safety Issues Affecting Function (Mobility) awareness of need for assistance;insight into  deficits/self-awareness;judgment;problem-solving;safety precaution awareness;safety precautions follow-through/compliance;sequencing abilities  -     Impairments Affecting Function (Mobility) balance;coordination;endurance/activity tolerance;pain;postural/trunk control;sensation/sensory awareness;strength  -           User Key  (r) = Recorded By, (t) = Taken By, (c) = Cosigned By    Initials Name Provider Type     Brit Honeycutt, PT Physical Therapist               Mobility     Row Name 08/09/22 1647          Bed Mobility    Bed Mobility rolling left;sidelying-sit;scooting/bridging  -     Rolling Left Gordon (Bed Mobility) contact guard;verbal cues;nonverbal cues (demo/gesture)  -BA     Scooting/Bridging Gordon (Bed Mobility) contact guard;verbal cues;nonverbal cues (demo/gesture)  -     Sidelying-Sit Gordon (Bed Mobility) contact guard;verbal cues;nonverbal cues (demo/gesture)  -     Assistive Device (Bed Mobility) bed rails  -     Comment, (Bed Mobility) Increased time and effort d/t slow movements and fear of pain and falling.  Edu on log rolling technique.  VCs/TCs for sequencing and hand placement.  Reported dizziness upon sitting EOB, but subsided fairly quickly.  BP stable.  -     Row Name 08/09/22 1647          Sit-Stand Transfer    Sit-Stand Gordon (Transfers) minimum assist (75% patient effort);verbal cues  -     Assistive Device (Sit-Stand Transfers) walker, front-wheeled  -BA     Comment, (Sit-Stand Transfer) VCs/TCs for optimal pre-positioning, sequencing, hand placement, and anterior weight shifting.  -     Row Name 08/09/22 1647          Gait/Stairs (Locomotion)    Gordon Level (Gait) minimum assist (75% patient effort);1 person assist;1 person to manage equipment;verbal cues;other (see comments)  with chair follow  -     Assistive Device (Gait) walker, front-wheeled  -BA     Distance in Feet (Gait) 20  -BA     Deviations/Abnormal Patterns (Gait)  bilateral deviations;antalgic;dany decreased;gait speed decreased;stride length decreased;weight shifting decreased  -     Bilateral Gait Deviations forward flexed posture;heel strike decreased  -     Comment, (Gait/Stairs) Pt demonstrated step through gait pattern.  VCs/TCs for upright posture, improved stride length, and walker management.  Gait distance limited by fatigue.  -           User Key  (r) = Recorded By, (t) = Taken By, (c) = Cosigned By    Initials Name Provider Type    Brit Jones PT Physical Therapist               Obj/Interventions     Row Name 08/09/22 1653          Balance    Balance Assessment sitting static balance;sitting dynamic balance;sit to stand dynamic balance;standing static balance;standing dynamic balance  -     Static Sitting Balance standby assist  -     Dynamic Sitting Balance contact guard  -     Position, Sitting Balance unsupported;sitting edge of bed;sitting in chair  -     Sit to Stand Dynamic Balance minimal assist;verbal cues  -     Static Standing Balance minimal assist;contact guard;verbal cues  -     Dynamic Standing Balance minimal assist;verbal cues  -     Position/Device Used, Standing Balance supported;walker, front-wheeled  -     Balance Interventions sit to stand;standing;supported;static;dynamic;occupation based/functional task  -     Comment, Balance Mild/mod instability with standing and ambulation activity with FWW.  No overt LOB noted.  Fear of falling and initial mild posterior lean, but improved upon VCs/TCs for postural correction and anterior weight shifting.  -           User Key  (r) = Recorded By, (t) = Taken By, (c) = Cosigned By    Initials Name Provider Type    Brit Jones, DREW Physical Therapist               Goals/Plan    No documentation.                Clinical Impression     Row Name 08/09/22 9575          Pain    Pretreatment Pain Rating 7/10  -     Posttreatment Pain Rating 7/10  -     Pain  Location - Side/Orientation Bilateral  -     Pain Location generalized  -     Pre/Posttreatment Pain Comment Reported c/o generalized pain in back, neck, and just above ankles up to BUEs.  Tolerated activity; RN aware and managing.  -     Pain Intervention(s) Ambulation/increased activity;Repositioned  -     Row Name 08/09/22 2561          Plan of Care Review    Plan of Care Reviewed With patient  -BA     Progress improving  -     Outcome Evaluation Improved performance and toleration to activity today noted by progressing to ambulation.  STS and ambulated 20ft with minAx1, FWW, and chair follow for safety.  Required increased time for mobility d/t slow movements, pain, and fear of falling.  Con to progress pt as able per PT POC.  Rec SNF upon d/c.  -     Row Name 08/09/22 9582          Vital Signs    Pre Systolic BP Rehab 134  supine  -BA     Pre Treatment Diastolic BP 88  -BA     Intra Systolic BP Rehab 122  sitting EOB  -BA     Intra Treatment Diastolic BP 94  -BA     Pretreatment Heart Rate (beats/min) 74  -BA     Posttreatment Heart Rate (beats/min) 78  -BA     Pre SpO2 (%) 95  -BA     O2 Delivery Pre Treatment nasal cannula  -BA     O2 Delivery Intra Treatment room air  -BA     O2 Delivery Post Treatment room air  -BA     Pre Patient Position Supine  -BA     Intra Patient Position Standing  -BA     Post Patient Position Sitting  -BA     Row Name 08/09/22 2689          Positioning and Restraints    Pre-Treatment Position in bed  -BA     Post Treatment Position chair  -BA     In Chair notified nsg;reclined;sitting;call light within reach;encouraged to call for assist;exit alarm on;with family/caregiver;with nsg;waffle cushion;legs elevated  -           User Key  (r) = Recorded By, (t) = Taken By, (c) = Cosigned By    Initials Name Provider Type    Brit Jones, PT Physical Therapist               Outcome Measures     Row Name 08/09/22 5391          How much help from another person do you  currently need...    Turning from your back to your side while in flat bed without using bedrails? 3  -BA     Moving from lying on back to sitting on the side of a flat bed without bedrails? 3  -BA     Moving to and from a bed to a chair (including a wheelchair)? 3  -BA     Standing up from a chair using your arms (e.g., wheelchair, bedside chair)? 3  -BA     Climbing 3-5 steps with a railing? 2  -BA     To walk in hospital room? 3  -BA     AM-PAC 6 Clicks Score (PT) 17  -BA     Highest level of mobility 5 --> Static standing  -BA     Row Name 08/09/22 1654          Functional Assessment    Outcome Measure Options AM-PAC 6 Clicks Basic Mobility (PT)  -           User Key  (r) = Recorded By, (t) = Taken By, (c) = Cosigned By    Initials Name Provider Type     Brit Honeycutt, PT Physical Therapist                             Physical Therapy Education                 Title: PT OT SLP Therapies (In Progress)     Topic: Physical Therapy (In Progress)     Point: Mobility training (Done)     Learning Progress Summary           Patient Acceptance, E, VU,NR by  at 8/9/2022 1700    Acceptance, E, NR by  at 8/5/2022 1100                   Point: Home exercise program (In Progress)     Learning Progress Summary           Patient Acceptance, E, NR by  at 8/5/2022 1100                   Point: Body mechanics (Done)     Learning Progress Summary           Patient Acceptance, E, VU,NR by  at 8/9/2022 1700    Acceptance, E, NR by  at 8/5/2022 1100                   Point: Precautions (Done)     Learning Progress Summary           Patient Acceptance, E, VU,NR by  at 8/9/2022 1700    Acceptance, E, NR by  at 8/5/2022 1100                               User Key     Initials Effective Dates Name Provider Type Discipline     06/16/21 -  Waleska Perez, PT Physical Therapist PT    SUGAR 09/21/21 -  Brit Honeycutt PT Physical Therapist PT              PT Recommendation and Plan     Plan of Care Reviewed With:  patient  Progress: improving  Outcome Evaluation: Improved performance and toleration to activity today noted by progressing to ambulation.  STS and ambulated 20ft with minAx1, FWW, and chair follow for safety.  Required increased time for mobility d/t slow movements, pain, and fear of falling.  Con to progress pt as able per PT POC.  Rec SNF upon d/c.     Time Calculation:    PT Charges     Row Name 08/09/22 1703             Time Calculation    Start Time 1613  -BA      PT Received On 08/09/22  -BA              Time Calculation- PT    Total Timed Code Minutes- PT 32 minute(s)  -BA              Timed Charges    16689 - Gait Training Minutes  13  -BA      96942 - PT Therapeutic Activity Minutes 19  -BA              Total Minutes    Timed Charges Total Minutes 32  -BA       Total Minutes 32  -BA            User Key  (r) = Recorded By, (t) = Taken By, (c) = Cosigned By    Initials Name Provider Type    Brit Jones, PT Physical Therapist              Therapy Charges for Today     Code Description Service Date Service Provider Modifiers Qty    40940257987 HC GAIT TRAINING EA 15 MIN 8/9/2022 Brit Honeycutt, PT GP 1    [id number]  PT THERAPEUTIC ACT EA 15 MIN 8/9/2022 Brit Honeycutt, PT GP 1          PT G-Codes  Outcome Measure Options: AM-PAC 6 Clicks Basic Mobility (PT)  AM-PAC 6 Clicks Score (PT): 17  AM-PAC 6 Clicks Score (OT): 15    Brit Honeycutt PT  8/9/2022

## 2022-08-09 NOTE — PROGRESS NOTES
[facility] Medicine Services  PROGRESS NOTE    Patient Name: Evelyn Rogers  : 1940  MRN: [id number]    Date of Admission: 2022  Primary Care Physician: Gerry Thorpe MD    Subjective   Subjective     CC: f/u weakness    HPI:   Resting in bed in no acute distress but tells me that she feels has pain all over.  Complains of generalized weakness. No fever or chills.  Tells me that she cannot walk at all because of severe weakness of lower extremity.    ROS:  Gen- No fevers, chills  CV- No chest pain, palpitations  Resp- No cough, dyspnea  GI- No N/V/D, abd pain     Objective   Objective     Vital Signs:   Temp:  [97.5 °F (36.4 °C)-97.9 °F (36.6 °C)] 97.5 °F (36.4 °C)  Heart Rate:  [59-97] 64  Resp:  [16-18] 18  BP: ()/(62-76) 112/71  Flow (L/min):  [2] 2     Physical Exam:  Constitutional: No acute distress  HENT: NCAT, mucous membranes moist, ecchymosis around the eyes bilaterally  Respiratory: Clear to auscultation bilaterally, respiratory effort normal   Cardiovascular: RRR, no murmurs, rubs, or gallops  Gastrointestinal: Abdomen is obese.  Positive bowel sounds, soft, nontender, nondistended  Musculoskeletal:  bilateral ankle edema, lower extremities very tender to touch  Psychiatric: Flat affect, cooperative  Neurologic: Awake, alert, oriented x3, no focality appreciated, speech clear  Skin: No rashes    Results Reviewed:  LAB RESULTS:      Lab 22  0833 22  0819   WBC 3.93 5.58   HEMOGLOBIN 12.4 15.5   HEMATOCRIT 36.1 46.3   PLATELETS 114* 148   NEUTROS ABS 1.99 3.04   IMMATURE GRANS (ABS) 0.01 0.02   LYMPHS ABS 1.27 1.95   MONOS ABS 0.43 0.35   EOS ABS 0.17 0.15   MCV 97.6* 97.7*   SED RATE  --  8         Lab 22  0833 22  0819   SODIUM 144 136   POTASSIUM 3.8 3.5   CHLORIDE 106 97*   CO2 30.0* 20.0*   ANION GAP 8.0 19.0*   BUN 23 20   CREATININE 0.91 1.02*   EGFR 63.9 55.7*   GLUCOSE 92 97   CALCIUM 9.6 10.3   MAGNESIUM  --  1.7   TSH  --   7.780*         Lab 08/04/22  0819   TOTAL PROTEIN 7.8   ALBUMIN 4.20   GLOBULIN 3.6   ALT (SGPT) 10   AST (SGOT) 20   BILIRUBIN 1.5*   ALK PHOS 109         Lab 08/04/22  0819   PROBNP 1,238.0   TROPONIN T <0.010                 Brief Urine Lab Results  (Last result in the past 365 days)      Color   Clarity   Blood   Leuk Est   Nitrite   Protein   CREAT   Urine HCG        08/05/22 1739 Yellow   Clear   Negative   Trace   Negative   Negative                 Microbiology Results Abnormal     Procedure Component Value - Date/Time    COVID-19 and FLU A/B PCR - Swab, Nasopharynx [[id number]]  (Normal) Collected: 08/04/22 0912    Lab Status: Final result Specimen: Swab from Nasopharynx Updated: 08/04/22 1006     COVID19 Not Detected     Influenza A PCR Not Detected     Influenza B PCR Not Detected    Narrative:      Fact sheet for providers: https://www.fda.gov/media/041563/download    Fact sheet for patients: https://www.fda.gov/media/682377/download    Test performed by PCR.          No radiology results from the last 24 hrs    Results for orders placed during the hospital encounter of 08/04/22    Adult Transthoracic Echo Complete W/ Cont if Necessary Per Protocol    Interpretation Summary  · Left ventricular ejection fraction appears to be 56 - 60%. Left ventricular systolic function is low normal.  · Left atrial volume is moderately increased.  · The right atrial cavity is severely dilated.  · There is mild calcification of the aortic valve.  · Severe tricuspid valve regurgitation is present.  · Estimated right ventricular systolic pressure from tricuspid regurgitation is moderately elevated (45-55 mmHg).  · There is a moderate (1-2cm) pericardial effusion. There is no evidence of cardiac tamponade.      I have reviewed the medications:  Scheduled Meds:amitriptyline, 25 mg, Oral, Nightly  apixaban, 5 mg, Oral, Q12H  carvedilol, 3.125 mg, Oral, BID With Meals  donepezil, 10 mg, Oral, Daily  levothyroxine, 88 mcg, Oral,  Daily  memantine, 10 mg, Oral, BID  senna-docusate sodium, 2 tablet, Oral, BID  sertraline, 100 mg, Oral, Daily  sodium chloride, 10 mL, Intravenous, Q12H      Continuous Infusions:   PRN Meds:.•  acetaminophen **OR** acetaminophen **OR** acetaminophen  •  ALPRAZolam  •  senna-docusate sodium **AND** polyethylene glycol **AND** bisacodyl **AND** bisacodyl  •  HYDROcodone-acetaminophen  •  ondansetron **OR** ondansetron  •  sodium chloride  •  sodium chloride    Assessment & Plan   Assessment & Plan     Active Hospital Problems    Diagnosis  POA   • Fall at home, initial encounter [W19.XXXA, Y92.009]  Not Applicable      Resolved Hospital Problems   No resolved problems to display.        Brief Hospital Course to date:  Evelyn Rogers is a 82 yo with PMH of HFpEF, Afib, Dementia, Lupus and anxiety that presented after a fall at home. She has had been having increased falls at home.     Multiple Falls at home  Debility   -Pt found down on the floor by EMS, lives at home and uses walker.  Had a fall  about 6 weeks ago and has been declining since.   -MRI shows acute/subacute superior plate compression fracture at L4.  Also shows nondisplaced acute/subacute transverse fracture at the inferior endplate of L5.  This study also shows multilevel degenerative disease.    History of hypertension but currently blood pressure is low  -Most likely this is contributing to weakness and also falls  -Patient also has history of diastolic dysfunction.   -Cardiology has seen and evaluated the patient, appreciate their input.    PAF  -Paroxysmal atrial fibrillation and patient was on amiodarone before, which was discontinued recently.  -Amiodarone was restarted on admission but cardiology discontinued that.    Diastolic dysfunction and also valvular heart disease with tricuspid regurg which was shown on the echocardiogram done on 2019  -Echocardiogram which was done on 8/7/2022 shows normal ejection fraction.  -Right atrial  dilation  -Severe trace Cuspid valve regurgitation  -Right ventricular systolic pressure is elevated estimated between 45 to 55 mmHg.  -Is also evidence of 1 to 2 cm pericardial effusion.    Cognitive Decline/Dementia  -CT head no acute finding but chronic small vessel ischemia/white matter changes  -cont namenda and aricept     CAD  HFpEF  HTN  -She is currently slightly hypotensive which may also be contributing to her dizziness/weakness. Note that she is on prn Bumex at home which we are holding.      Hypothyroidism  -synthroid     Anxiety  -prn xanax    Expected Discharge Location and Transportation: [facility]  Expected Discharge Date: 8/8    DVT prophylaxis:  Medical DVT prophylaxis orders are present.     AM-PAC 6 Clicks Score (PT): 12 (08/08/22 0820)    CODE STATUS:   Code Status and Medical Interventions:   Ordered at: 08/04/22 9681     Level Of Support Discussed With:    Patient     Code Status (Patient has no pulse and is not breathing):    CPR (Attempt to Resuscitate)     Medical Interventions (Patient has pulse or is breathing):    Full Support       Davi Mojica MD  08/09/22

## 2022-08-09 NOTE — PROGRESS NOTES
Neurology    Referring Provider: Dr. Pepper    Reason for Consultation: Weakness    Chief complaint: Weakness and falls    HPI:  Patient is a 81-year-old female with past medical history of CAD, hypertension, history of pericardial effusion, A. fib on Eliquis, mild cognitive impairment presented to ED after fall from home.  She lives at home with her son and usually uses a walker to walk.  She reports that in last 2 to 3 weeks, she has noticed that whenever she is tries to stand up from sitting position, she gets really dizzy, feels weak and then has difficulty with walking.  With last fall, she was trying to get up to use restroom at night and soon after getting up, felt dizzy and fell to the floor.  She is also complaining of pain all over.  She is reporting that she has lost feeling in both her feet and cannot hardly feel her feet on the ground while walking.  She also reports tingling and numbness extending up to the ankles bilaterally.  Since admission, she has had MRI of lumbar spine which showed compression fracture at L4 and L5.      Current Facility-Administered Medications:   •  acetaminophen (TYLENOL) tablet 650 mg, 650 mg, Oral, Q4H PRN, 650 mg at 08/07/22 2023 **OR** acetaminophen (TYLENOL) 160 MG/5ML solution 650 mg, 650 mg, Oral, Q4H PRN **OR** acetaminophen (TYLENOL) suppository 650 mg, 650 mg, Rectal, Q4H PRN, Ines Lorenz DO  •  ALPRAZolam (XANAX) tablet 0.5 mg, 0.5 mg, Oral, Daily PRN, Ines Lorenz DO, 0.5 mg at 08/08/22 2221  •  amitriptyline (ELAVIL) tablet 25 mg, 25 mg, Oral, Nightly, Ines Lorenz DO, 25 mg at 08/08/22 2037  •  apixaban (ELIQUIS) tablet 5 mg, 5 mg, Oral, Q12H, Ines Lorenz DO, 5 mg at 08/09/22 0825  •  sennosides-docusate (PERICOLACE) 8.6-50 MG per tablet 2 tablet, 2 tablet, Oral, BID **AND** polyethylene glycol (MIRALAX) packet 17 g, 17 g, Oral, Daily PRN **AND** bisacodyl (DULCOLAX) EC tablet 5 mg, 5 mg, Oral, Daily  PRN **AND** bisacodyl (DULCOLAX) suppository 10 mg, 10 mg, Rectal, Daily PRN, Nia Duncan APRN  •  carvedilol (COREG) tablet 3.125 mg, 3.125 mg, Oral, BID With Meals, Davi Mojica MD, 3.125 mg at 08/07/22 1751  •  donepezil (ARICEPT) tablet 10 mg, 10 mg, Oral, Daily, Ines Lorenz DO, 10 mg at 08/09/22 0825  •  HYDROcodone-acetaminophen (NORCO) 5-325 MG per tablet 1 tablet, 1 tablet, Oral, Q6H PRN, Davi Mojica MD  •  levothyroxine (SYNTHROID, LEVOTHROID) tablet 88 mcg, 88 mcg, Oral, Daily, Ines Lorenz DO, 88 mcg at 08/09/22 0825  •  memantine (NAMENDA) tablet 10 mg, 10 mg, Oral, BID, Ines Lorenz DO, 10 mg at 08/09/22 0825  •  ondansetron (ZOFRAN) tablet 4 mg, 4 mg, Oral, Q6H PRN **OR** ondansetron (ZOFRAN) injection 4 mg, 4 mg, Intravenous, Q6H PRN, Ines Lorenz DO, 4 mg at 08/07/22 0901  •  sertraline (ZOLOFT) tablet 100 mg, 100 mg, Oral, Daily, Ines Lorenz DO, 100 mg at 08/09/22 0825  •  sodium chloride 0.9 % flush 10 mL, 10 mL, Intravenous, PRN, Emergency, Triage Protocol, MD  •  sodium chloride 0.9 % flush 10 mL, 10 mL, Intravenous, Q12H, Ines Lorenz DO, 10 mL at 08/09/22 0826  •  sodium chloride 0.9 % flush 10 mL, 10 mL, Intravenous, PRN, Ines Lorenz DO       Past Medical History:   Diagnosis Date   • CAD (coronary artery disease)    • CHF (congestive heart failure) (HCC)    • Hypertension         History reviewed. No pertinent surgical history.     History reviewed. No pertinent family history.     Social History     Socioeconomic History   • Marital status:    Tobacco Use   • Smoking status: Never Smoker   • Smokeless tobacco: Never Used   Substance and Sexual Activity   • Alcohol use: No   • Drug use: No   • Sexual activity: Defer       Review of Systems    Pertinent items are noted in HPI, all other systems reviewed and negative     Objective:    /71 (BP Location: Left  "arm, Patient Position: Lying)   Pulse 71   Temp 97.5 °F (36.4 °C) (Oral)   Resp 18   Ht 162.6 cm (64\")   Wt 73.5 kg (162 lb)   SpO2 99%   BMI 27.81 kg/m²     Exam:     Cardiovascular:  No rubs, gallops or murmurs. Regular rate and rhythm.     Neurology Exam:    General appearance: NAD.     Mental status: Alert, awake and oriented to time place and person.    Recent and Remote memory: Intact.    Attention span and Concentration: Normal.     Language and Speech: Intact- No dysarthria.    Fluency, Naming , Repetition and Comprehension:  Intact    Cranial Nerves:   CN II: Visual fields are full. Intact. Pupils - YOLANDA  CN III, IV and VI: Extraocular movements are intact. Normal saccades.   CN V: Facial sensation is intact.   CN VII: Muscles of facial expression reveal no asymmetry. Intact.   CN VIII: Hearing is intact. Whispered voice intact.   CN IX and X: Palate elevates symmetrically. Intact  CN XI: Shoulder shrug is intact.   CN XII: Tongue is midline without evidence of atrophy or fasciculation.     Ophthalmoscopic Exam: Fundi - Normal, No papilledema    Motor:  Right UE muscle strength 5/5. Normal tone.     Left UE muscle strength 5/5. Normal tone.      Right LE muscle strength5/5. Normal tone.     Left LE muscle strength 5/5. Normal tone.      Sensory: Reduced light touch, vibration and pinprick sensation bilaterally in bilateral feet.    DTRs: 1+ bilaterally in upper and lower extremities.    Babinski: Negative bilaterally.    Coordination: Normal finger-to-nose    Romberg: not assessed    Gait: Not assessed.    Results Reviewed:     Labs:  Most recent labs have been reviewed.    Results from last 7 days   Lab Units 08/05/22  0833   WBC 10*3/mm3 3.93   HEMOGLOBIN g/dL 12.4   HEMATOCRIT % 36.1   PLATELETS 10*3/mm3 114*     Results from last 7 days   Lab Units 08/05/22  0833   SODIUM mmol/L 144   POTASSIUM mmol/L 3.8   CHLORIDE mmol/L 106   CO2 mmol/L 30.0*   BUN mg/dL 23   CREATININE mg/dL 0.91   CALCIUM " mg/dL 9.6       Radiology: No radiology results for the last day        Assessment & Plan      81-year-old female with past medical history of A. fib, mild cognitive impairment, hypertension presented with complaint of generalized weakness and fall at home.    -She is reporting severe dizziness when she stands up from sitting position-likely orthostatic hypotension contributing to balance and falls.  She also has symptoms suggestive of neuropathy which can certainly affect balance.    -Consider checking orthostasis if possible while in the hospital.   -Low-dose Lyrica or gabapentin can be considered for symptomatic treatment of neuropathy however considering her age, it can make her sleepy and can affect balance further.  Will defer this for now.  -Generalized pain all over: Unknown etiology.  She does have L4, L5 compression fracture.  -Will need aggressive OT, PT.  -Cognitively, she seems intact without any obvious signs of dementia.  -Neurology will continue to follow.      I discussed the patients findings and my recommendations with patient    As part of this visit I reviewed prior lab results, reviewed radiology results, reviewed radiology images and reviewed records from the current hospitalization which is incorporated in the HPI. Please see above for details.        Adrian Herron MD  08/09/22  13:17 EDT

## 2022-08-09 NOTE — PLAN OF CARE
Goal Outcome Evaluation:  Plan of Care Reviewed With: patient        Progress: improving  Outcome Evaluation: Improved performance and toleration to activity today noted by progressing to ambulation.  STS and ambulated 20ft with minAx1, FWW, and chair follow for safety.  Required increased time for mobility d/t slow movements, pain, and fear of falling.  Con to progress pt as able per PT POC.  Rec SNF upon d/c.

## 2022-08-10 LAB — GLUCOSE BLDC GLUCOMTR-MCNC: 95 MG/DL (ref 70–130)

## 2022-08-10 PROCEDURE — 97530 THERAPEUTIC ACTIVITIES: CPT

## 2022-08-10 PROCEDURE — 82962 GLUCOSE BLOOD TEST: CPT

## 2022-08-10 PROCEDURE — 99232 SBSQ HOSP IP/OBS MODERATE 35: CPT | Performed by: INTERNAL MEDICINE

## 2022-08-10 PROCEDURE — 99233 SBSQ HOSP IP/OBS HIGH 50: CPT | Performed by: PSYCHIATRY & NEUROLOGY

## 2022-08-10 RX ADMIN — HYDROCODONE BITARTRATE AND ACETAMINOPHEN 1 TABLET: 5; 325 TABLET ORAL at 21:43

## 2022-08-10 RX ADMIN — AMITRIPTYLINE HYDROCHLORIDE 25 MG: 25 TABLET, FILM COATED ORAL at 21:43

## 2022-08-10 RX ADMIN — ALPRAZOLAM 0.5 MG: 0.5 TABLET ORAL at 21:43

## 2022-08-10 RX ADMIN — APIXABAN 5 MG: 5 TABLET, FILM COATED ORAL at 21:43

## 2022-08-10 RX ADMIN — DONEPEZIL HYDROCHLORIDE 10 MG: 5 TABLET ORAL at 08:54

## 2022-08-10 RX ADMIN — MEMANTINE 10 MG: 10 TABLET ORAL at 21:43

## 2022-08-10 RX ADMIN — Medication 10 ML: at 08:55

## 2022-08-10 RX ADMIN — MEMANTINE 10 MG: 10 TABLET ORAL at 08:54

## 2022-08-10 RX ADMIN — SERTRALINE 100 MG: 100 TABLET, FILM COATED ORAL at 08:54

## 2022-08-10 RX ADMIN — LEVOTHYROXINE SODIUM 88 MCG: 88 TABLET ORAL at 08:54

## 2022-08-10 RX ADMIN — APIXABAN 5 MG: 5 TABLET, FILM COATED ORAL at 08:54

## 2022-08-10 RX ADMIN — Medication 10 ML: at 21:43

## 2022-08-10 NOTE — PROGRESS NOTES
[facility] Medicine Services  PROGRESS NOTE    Patient Name: Evelyn Rogers  : 1940  MRN: [id number]    Date of Admission: 2022  Primary Care Physician: Gerry Thorpe MD    Subjective   Subjective     CC: f/u weakness    HPI:   Resting in bed in no acute distress, feels improved a little bit.  Participating more with physical therapy.  Complains of generalized weakness. No fever or chills.     ROS:  Gen- No fevers, chills  CV- No chest pain, palpitations  Resp- No cough, dyspnea  GI- No N/V/D, abd pain     Objective   Objective     Vital Signs:   Temp:  [97.8 °F (36.6 °C)-98.4 °F (36.9 °C)] 98.4 °F (36.9 °C)  Heart Rate:  [60-81] 80  Resp:  [14-18] 16  BP: ()/() 104/56  Flow (L/min):  [2] 2     Physical Exam:  Constitutional: No acute distress  HENT: NCAT, mucous membranes moist, ecchymosis around the eyes bilaterally  Respiratory: Clear to auscultation bilaterally, respiratory effort normal   Cardiovascular: RRR, no murmurs, rubs, or gallops  Gastrointestinal: Abdomen is obese.  Positive bowel sounds, soft, nontender, nondistended  Musculoskeletal:  bilateral ankle edema, lower extremities very tender to touch  Psychiatric: Flat affect, cooperative  Neurologic: Awake, alert, oriented x3, no focality appreciated, speech clear  Skin: No rashes    Results Reviewed:  LAB RESULTS:      Lab 22  0833 22  0819   WBC 3.93 5.58   HEMOGLOBIN 12.4 15.5   HEMATOCRIT 36.1 46.3   PLATELETS 114* 148   NEUTROS ABS 1.99 3.04   IMMATURE GRANS (ABS) 0.01 0.02   LYMPHS ABS 1.27 1.95   MONOS ABS 0.43 0.35   EOS ABS 0.17 0.15   MCV 97.6* 97.7*   SED RATE  --  8         Lab 22  0833 22  0819   SODIUM 144 136   POTASSIUM 3.8 3.5   CHLORIDE 106 97*   CO2 30.0* 20.0*   ANION GAP 8.0 19.0*   BUN 23 20   CREATININE 0.91 1.02*   EGFR 63.9 55.7*   GLUCOSE 92 97   CALCIUM 9.6 10.3   MAGNESIUM  --  1.7   TSH  --  7.780*         Lab 22  0819   TOTAL PROTEIN 7.8    ALBUMIN 4.20   GLOBULIN 3.6   ALT (SGPT) 10   AST (SGOT) 20   BILIRUBIN 1.5*   ALK PHOS 109         Lab 08/04/22  0819   PROBNP 1,238.0   TROPONIN T <0.010                 Brief Urine Lab Results  (Last result in the past 365 days)      Color   Clarity   Blood   Leuk Est   Nitrite   Protein   CREAT   Urine HCG        08/05/22 1739 Yellow   Clear   Negative   Trace   Negative   Negative                 Microbiology Results Abnormal     Procedure Component Value - Date/Time    COVID-19 and FLU A/B PCR - Swab, Nasopharynx [[id number]]  (Normal) Collected: 08/04/22 0912    Lab Status: Final result Specimen: Swab from Nasopharynx Updated: 08/04/22 1006     COVID19 Not Detected     Influenza A PCR Not Detected     Influenza B PCR Not Detected    Narrative:      Fact sheet for providers: https://www.fda.gov/media/163222/download    Fact sheet for patients: https://www.fda.gov/media/135989/download    Test performed by PCR.          No radiology results from the last 24 hrs    Results for orders placed during the hospital encounter of 08/04/22    Adult Transthoracic Echo Complete W/ Cont if Necessary Per Protocol    Interpretation Summary  · Left ventricular ejection fraction appears to be 56 - 60%. Left ventricular systolic function is low normal.  · Left atrial volume is moderately increased.  · The right atrial cavity is severely dilated.  · There is mild calcification of the aortic valve.  · Severe tricuspid valve regurgitation is present.  · Estimated right ventricular systolic pressure from tricuspid regurgitation is moderately elevated (45-55 mmHg).  · There is a moderate (1-2cm) pericardial effusion. There is no evidence of cardiac tamponade.      I have reviewed the medications:  Scheduled Meds:amitriptyline, 25 mg, Oral, Nightly  apixaban, 5 mg, Oral, Q12H  carvedilol, 3.125 mg, Oral, BID With Meals  donepezil, 10 mg, Oral, Daily  levothyroxine, 88 mcg, Oral, Daily  memantine, 10 mg, Oral, BID  senna-docusate  sodium, 2 tablet, Oral, BID  sertraline, 100 mg, Oral, Daily  sodium chloride, 10 mL, Intravenous, Q12H      Continuous Infusions:   PRN Meds:.•  acetaminophen **OR** acetaminophen **OR** acetaminophen  •  ALPRAZolam  •  senna-docusate sodium **AND** polyethylene glycol **AND** bisacodyl **AND** bisacodyl  •  HYDROcodone-acetaminophen  •  ondansetron **OR** ondansetron  •  sodium chloride  •  sodium chloride    Assessment & Plan   Assessment & Plan     Active Hospital Problems    Diagnosis  POA   • Fall at home, initial encounter [W19.XXXA, Y92.009]  Not Applicable      Resolved Hospital Problems   No resolved problems to display.        Brief Hospital Course to date:  Evelyn Rogers is a 80 yo with PMH of HFpEF, Afib, Dementia, Lupus and anxiety that presented after a fall at home. She has had been having increased falls at home.     Multiple Falls at home  Debility   -Pt found down on the floor by EMS, lives at home and uses walker.  Had a fall  about 6 weeks ago and has been declining since.   -MRI shows acute/subacute superior plate compression fracture at L4.  Also shows nondisplaced acute/subacute transverse fracture at the inferior endplate of L5.  This study also shows multilevel degenerative disease.    History of hypertension but currently blood pressure is low  -Most likely this is contributing to weakness and also falls  -Patient also has history of diastolic dysfunction.   -Cardiology has seen and evaluated the patient, appreciate their input.    PAF  -Paroxysmal atrial fibrillation and patient was on amiodarone before, which was discontinued recently.  -Amiodarone was restarted on admission but cardiology discontinued that.    Diastolic dysfunction and also valvular heart disease with tricuspid regurg which was shown on the echocardiogram done on 2019  -Echocardiogram which was done on 8/7/2022 shows normal ejection fraction.  -Right atrial dilation  -Severe tricuspid valve regurgitation  -Right  ventricular systolic pressure is elevated estimated between 45 to 55 mmHg.  -There is also evidence of 1 to 2 cm pericardial effusion.    Cognitive Decline/Dementia  -CT head no acute finding but chronic small vessel ischemia/white matter changes  -cont namenda and aricept     CAD  HFpEF  HTN  -She is currently slightly hypotensive which may also be contributing to her dizziness/weakness. Note that she is on prn Bumex at home which we are holding.      Hypothyroidism  -synthroid     Anxiety  -prn xanax    Expected Discharge Location and Transportation: SNF  Expected Discharge Date:     DVT prophylaxis:  Medical DVT prophylaxis orders are present.     AM-PAC 6 Clicks Score (PT): 17 (22 6911)    CODE STATUS:   Code Status and Medical Interventions:   Ordered at: 22 165     Level Of Support Discussed With:    Patient     Code Status (Patient has no pulse and is not breathing):    CPR (Attempt to Resuscitate)     Medical Interventions (Patient has pulse or is breathing):    Full Support       Davi Mojica MD  08/10/22              [facility] Medicine [facility]  PROGRESS NOTE    Patient Name: Evelyn Rogers  : 1940  MRN: [id number]    Date of Admission: 2022  Primary Care Physician: Gerry Thorpe MD    Subjective   Subjective     CC: f/u weakness    HPI:   Resting in bed in no acute distress but tells me that she feels has pain all over.  Complains of generalized weakness. No fever or chills.  Tells me that she cannot walk at all because of severe weakness of lower extremity.    ROS:  Gen- No fevers, chills  CV- No chest pain, palpitations  Resp- No cough, dyspnea  GI- No N/V/D, abd pain     Objective   Objective     Vital Signs:   Temp:  [97.8 °F (36.6 °C)-98.4 °F (36.9 °C)] 98.4 °F (36.9 °C)  Heart Rate:  [60-81] 80  Resp:  [14-18] 16  BP: ()/() 104/56  Flow (L/min):  [2] 2     Physical Exam:  Constitutional: No acute distress  HENT: NCAT, mucous  membranes moist, ecchymosis around the eyes bilaterally  Respiratory: Clear to auscultation bilaterally, respiratory effort normal   Cardiovascular: RRR, no murmurs, rubs, or gallops  Gastrointestinal: Abdomen is obese.  Positive bowel sounds, soft, nontender, nondistended  Musculoskeletal:  bilateral ankle edema, lower extremities very tender to touch  Psychiatric: Flat affect, cooperative  Neurologic: Awake, alert, oriented x3, no focality appreciated, speech clear  Skin: No rashes    Results Reviewed:  LAB RESULTS:      Lab 08/05/22  0833 08/04/22  0819   WBC 3.93 5.58   HEMOGLOBIN 12.4 15.5   HEMATOCRIT 36.1 46.3   PLATELETS 114* 148   NEUTROS ABS 1.99 3.04   IMMATURE GRANS (ABS) 0.01 0.02   LYMPHS ABS 1.27 1.95   MONOS ABS 0.43 0.35   EOS ABS 0.17 0.15   MCV 97.6* 97.7*   SED RATE  --  8         Lab 08/05/22  0833 08/04/22  0819   SODIUM 144 136   POTASSIUM 3.8 3.5   CHLORIDE 106 97*   CO2 30.0* 20.0*   ANION GAP 8.0 19.0*   BUN 23 20   CREATININE 0.91 1.02*   EGFR 63.9 55.7*   GLUCOSE 92 97   CALCIUM 9.6 10.3   MAGNESIUM  --  1.7   TSH  --  7.780*         Lab 08/04/22  0819   TOTAL PROTEIN 7.8   ALBUMIN 4.20   GLOBULIN 3.6   ALT (SGPT) 10   AST (SGOT) 20   BILIRUBIN 1.5*   ALK PHOS 109         Lab 08/04/22  0819   PROBNP 1,238.0   TROPONIN T <0.010                 Brief Urine Lab Results  (Last result in the past 365 days)      Color   Clarity   Blood   Leuk Est   Nitrite   Protein   CREAT   Urine HCG        08/05/22 1739 Yellow   Clear   Negative   Trace   Negative   Negative                 Microbiology Results Abnormal     Procedure Component Value - Date/Time    COVID-19 and FLU A/B PCR - Swab, Nasopharynx [[id number]]  (Normal) Collected: 08/04/22 0912    Lab Status: Final result Specimen: Swab from Nasopharynx Updated: 08/04/22 1006     COVID19 Not Detected     Influenza A PCR Not Detected     Influenza B PCR Not Detected    Narrative:      Fact sheet for providers:  https://www.fda.gov/media/373911/download    Fact sheet for patients: https://www.fda.gov/media/839220/download    Test performed by PCR.          No radiology results from the last 24 hrs    Results for orders placed during the hospital encounter of 08/04/22    Adult Transthoracic Echo Complete W/ Cont if Necessary Per Protocol    Interpretation Summary  · Left ventricular ejection fraction appears to be 56 - 60%. Left ventricular systolic function is low normal.  · Left atrial volume is moderately increased.  · The right atrial cavity is severely dilated.  · There is mild calcification of the aortic valve.  · Severe tricuspid valve regurgitation is present.  · Estimated right ventricular systolic pressure from tricuspid regurgitation is moderately elevated (45-55 mmHg).  · There is a moderate (1-2cm) pericardial effusion. There is no evidence of cardiac tamponade.      I have reviewed the medications:  Scheduled Meds:amitriptyline, 25 mg, Oral, Nightly  apixaban, 5 mg, Oral, Q12H  carvedilol, 3.125 mg, Oral, BID With Meals  donepezil, 10 mg, Oral, Daily  levothyroxine, 88 mcg, Oral, Daily  memantine, 10 mg, Oral, BID  senna-docusate sodium, 2 tablet, Oral, BID  sertraline, 100 mg, Oral, Daily  sodium chloride, 10 mL, Intravenous, Q12H      Continuous Infusions:   PRN Meds:.•  acetaminophen **OR** acetaminophen **OR** acetaminophen  •  ALPRAZolam  •  senna-docusate sodium **AND** polyethylene glycol **AND** bisacodyl **AND** bisacodyl  •  HYDROcodone-acetaminophen  •  ondansetron **OR** ondansetron  •  sodium chloride  •  sodium chloride    Assessment & Plan   Assessment & Plan     Active Hospital Problems    Diagnosis  POA   • Fall at home, initial encounter [W19.XXXA, Y92.009]  Not Applicable      Resolved Hospital Problems   No resolved problems to display.        Brief Hospital Course to date:  Evelyn Rogers is a 80 yo with PMH of HFpEF, Afib, Dementia, Lupus and anxiety that presented after a fall at home. She  has had been having increased falls at home.     Multiple Falls at home  Debility   -Pt found down on the floor by EMS, lives at home and uses walker.  Had a fall  about 6 weeks ago and has been declining since.   -MRI shows acute/subacute superior plate compression fracture at L4.  Also shows nondisplaced acute/subacute transverse fracture at the inferior endplate of L5.  This study also shows multilevel degenerative disease.    History of hypertension but currently blood pressure is low  -Most likely this is contributing to weakness and also falls  -Patient also has history of diastolic dysfunction.   -Cardiology has seen and evaluated the patient, appreciate their input.    PAF  -Paroxysmal atrial fibrillation and patient was on amiodarone before, which was discontinued recently.  -Amiodarone was restarted on admission but cardiology discontinued that.    Diastolic dysfunction and also valvular heart disease with tricuspid regurg which was shown on the echocardiogram done on 2019  -Echocardiogram which was done on 8/7/2022 shows normal ejection fraction.  -Right atrial dilation  -Severe trace Cuspid valve regurgitation  -Right ventricular systolic pressure is elevated estimated between 45 to 55 mmHg.  -Is also evidence of 1 to 2 cm pericardial effusion.    Cognitive Decline/Dementia  -CT head no acute finding but chronic small vessel ischemia/white matter changes  -cont namenda and aricept     CAD  HFpEF  HTN  -She is currently slightly hypotensive which may also be contributing to her dizziness/weakness. Note that she is on prn Bumex at home which we are holding.      Hypothyroidism  -synthroid     Anxiety  -prn xanax    Expected Discharge Location and Transportation: SNF  Expected Discharge Date: 8/8    DVT prophylaxis:  Medical DVT prophylaxis orders are present.     AM-PAC 6 Clicks Score (PT): 17 (08/09/22 5717)    CODE STATUS:   Code Status and Medical Interventions:   Ordered at: 08/04/22 3128     Level Of  Support Discussed With:    Patient     Code Status (Patient has no pulse and is not breathing):    CPR (Attempt to Resuscitate)     Medical Interventions (Patient has pulse or is breathing):    Full Support       Davi Mojica MD  08/10/22

## 2022-08-10 NOTE — THERAPY TREATMENT NOTE
Patient Name: Evelyn Rogers  : 1940    MRN: [id number]                              Today's Date: 8/10/2022       Admit Date: 2022    Visit Dx:     ICD-10-CM ICD-9-CM   1. Fall at home, initial encounter  W19.XXXA E888.9    Y92.009 E849.0   2. Contusion of left knee and lower leg, initial encounter  S80.02XA 924.10    S80.12XA 924.11   3. Contusion of right knee and lower leg, initial encounter  S80.01XA 924.10    S80.11XA 924.11   4. Cervical sprain, initial encounter  S13.9XXA 847.0   5. Compression fx, lumbar spine, closed, initial encounter (Formerly [facility])  S32.000A 805.4   6. Decreased ambulation status  Z74.09 780.99     Patient Active Problem List   Diagnosis   • CAP (community acquired pneumonia)   • Acute on chronic congestive heart failure (HCC)   • A-fib (Formerly [facility])   • Pleural effusion, bilateral   • Pericardial effusion   • CAD (coronary artery disease)   • Essential hypertension   • Liver nodule   • Bacteriuria   • Fall at home, initial encounter     Past Medical History:   Diagnosis Date   • CAD (coronary artery disease)    • CHF (congestive heart failure) (Formerly [facility])    • Hypertension      History reviewed. No pertinent surgical history.   General Information     Row Name 08/10/22 1428          OT Time and Intention    Document Type therapy note (daily note)  -KF     Mode of Treatment occupational therapy  -KF     Row Name 08/10/22 1428          General Information    Patient Profile Reviewed yes  -KF     Existing Precautions/Restrictions fall;spinal;other (see comments)  compression spinal fx  -KF     Barriers to Rehab medically complex;previous functional deficit  -KF     Row Name 08/10/22 1428          Cognition    Orientation Status (Cognition) oriented x 3  -KF     Row Name 08/10/22 1428          Safety Issues, Functional Mobility    Safety Issues Affecting Function (Mobility) awareness of need for assistance;insight into deficits/self-awareness  -KF     Impairments Affecting Function (Mobility)  balance;coordination;endurance/activity tolerance;pain;postural/trunk control;sensation/sensory awareness;strength  -KF           User Key  (r) = Recorded By, (t) = Taken By, (c) = Cosigned By    Initials Name Provider Type    KF Maricel Talamantes, OT Occupational Therapist                 Mobility/ADL's     Row Name 08/10/22 1429          Bed Mobility    Bed Mobility scooting/bridging;rolling left;supine-sit  -KF     Rolling Left [place] (Bed Mobility) contact guard;verbal cues;nonverbal cues (demo/gesture)  -KF     Scooting/Bridging [place] (Bed Mobility) contact guard;verbal cues;nonverbal cues (demo/gesture)  -KF     Supine-Sit [place] (Bed Mobility) contact guard;verbal cues  -KF     Bed Mobility, Safety Issues impaired trunk control for bed mobility;decreased use of arms for pushing/pulling;decreased use of legs for bridging/pushing  -KF     Assistive Device (Bed Mobility) bed rails  -KF     Comment, (Bed Mobility) min VC's for seq with log roll technique  -     Row Name 08/10/22 1429          Transfers    Transfers sit-stand transfer;stand-sit transfer;bed-chair transfer  -KF     Comment, (Transfers) extended time required but decreased assist  -KF     Bed-Chair [place] (Transfers) contact guard;verbal cues  -KF     Assistive Device (Bed-Chair Transfers) walker, front-wheeled  -     Sit-Stand [place] (Transfers) minimum assist (75% patient effort);verbal cues  -KF     Stand-Sit [place] (Transfers) contact guard;verbal cues  -KF     Row Name 08/10/22 1429          Sit-Stand Transfer    Assistive Device (Sit-Stand Transfers) walker, front-wheeled  -     Row Name 08/10/22 1429          Stand-Sit Transfer    Assistive Device (Stand-Sit Transfers) walker, front-wheeled  -KF     Row Name 08/10/22 1429          Functional Mobility    Functional Mobility- Ind. Level contact guard assist  -     Functional Mobility- Device walker, front-wheeled  -     Functional  Mobility-Distance (Feet) 4  -KF     Functional Mobility- Safety Issues step length decreased;weight-shifting ability decreased  -KF     Row Name 08/10/22 1429          Activities of Daily Living    BADL Assessment/Intervention lower body dressing;upper body dressing  -KF     Row Name 08/10/22 1429          Lower Body Dressing Assessment/Training    [place] Level (Lower Body Dressing) don;socks;dependent (less than 25% patient effort)  -KF     Position (Lower Body Dressing) sitting up in bed  -KF     Row Name 08/10/22 1429          Upper Body Dressing Assessment/Training    [place] Level (Upper Body Dressing) front opening garment;don;minimum assist (75% patient effort)  -KF     Position (Upper Body Dressing) edge of bed sitting  -KF           User Key  (r) = Recorded By, (t) = Taken By, (c) = Cosigned By    Initials Name Provider Type    Maricel Adams OT Occupational Therapist               Obj/Interventions     Row Name 08/10/22 1436          Balance    Balance Assessment sitting static balance;sitting dynamic balance;standing static balance;standing dynamic balance  -KF     Static Sitting Balance independent  -KF     Dynamic Sitting Balance supervision  -KF     Position, Sitting Balance sitting edge of bed  -KF     Static Standing Balance contact guard  -KF     Dynamic Standing Balance contact guard  -KF     Position/Device Used, Standing Balance walker, rolling  -KF     Balance Interventions sitting;standing;sit to stand;supported;minimal challenge;weight shifting activity  -KF     Comment, Balance improved stability and good tolerance sitting EOB with use of UEs  -KF           User Key  (r) = Recorded By, (t) = Taken By, (c) = Cosigned By    Initials Name Provider Type    Maricel Adams OT Occupational Therapist               Goals/Plan    No documentation.                Clinical Impression     Row Name 08/10/22 1437          Pain Assessment    Pretreatment Pain Rating 0/10 - no pain   -KF     Posttreatment Pain Rating 0/10 - no pain  -KF     Pre/Posttreatment Pain Comment tolerated  -     Pain Intervention(s) Repositioned;Ambulation/increased activity  -     Row Name 08/10/22 1437          Plan of Care Review    Plan of Care Reviewed With patient  -     Progress improving  -     Outcome Evaluation Pt completed STS Stormy and steps CGA at [facility] progressing well for bed to chair transfer, no LOB, CGA with cues bed mob for sequencing, maxA LBD, setup UBD sitting EOB, cont IPOT per POC recom rehab at d/c  -     Row Name 08/10/22 1437          Therapy Assessment/Plan (OT)    Rehab Potential (OT) good, to achieve stated therapy goals  -     Criteria for Skilled Therapeutic Interventions Met (OT) yes;meets criteria;skilled treatment is necessary  -     Therapy Frequency (OT) daily  -     Row Name 08/10/22 1437          Therapy Plan Review/Discharge Plan (OT)    Anticipated Discharge Disposition (OT) inpatient rehabilitation facility  -     Row Name 08/10/22 1437          Vital Signs    Pre Systolic BP Rehab --  RN cleared VSS no dizziness  -KF     O2 Delivery Pre Treatment room air  -KF     Post SpO2 (%) 94  -KF     Pre Patient Position Supine  -KF     Post Patient Position Sitting  -KF     Rest Breaks  1  -     Row Name 08/10/22 1437          Positioning and Restraints    Pre-Treatment Position in bed  -KF     Post Treatment Position chair  -KF     In Chair notified nsg;reclined;sitting;call light within reach;encouraged to call for assist;exit alarm on;waffle cushion;heels elevated;legs elevated  -KF           User Key  (r) = Recorded By, (t) = Taken By, (c) = Cosigned By    Initials Name Provider Type    KF Maricel Talamantes, OT Occupational Therapist               Outcome Measures     Row Name 08/10/22 1439          How much help from another is currently needed...    Putting on and taking off regular lower body clothing? 2  -KF     Bathing (including washing, rinsing, and drying) 2   -KF     Toileting (which includes using toilet bed pan or urinal) 2  -KF     Putting on and taking off regular upper body clothing 3  -KF     Taking care of personal grooming (such as brushing teeth) 3  -KF     Eating meals 4  -KF     AM-PAC 6 Clicks Score (OT) 16  -KF     Row Name 08/10/22 1439          Functional Assessment    Outcome Measure Options AM-PAC 6 Clicks Daily Activity (OT)  -KF           User Key  (r) = Recorded By, (t) = Taken By, (c) = Cosigned By    Initials Name Provider Type    Maricel Adams, OT Occupational Therapist                Occupational Therapy Education                 Title: PT OT SLP Therapies (In Progress)     Topic: Occupational Therapy (Done)     Point: ADL training (Done)     Description:   Instruct learner(s) on proper safety adaptation and remediation techniques during self care or transfers.   Instruct in proper use of assistive devices.              Learning Progress Summary           Patient Acceptance, E,TB,D, VU,DU,NR by  at 8/10/2022 1439    Acceptance, E, VU,NR by  at 8/6/2022 0953                   Point: Home exercise program (Done)     Description:   Instruct learner(s) on appropriate technique for monitoring, assisting and/or progressing therapeutic exercises/activities.              Learning Progress Summary           Patient Acceptance, E,TB,D, VU,DU,NR by  at 8/10/2022 1439    Acceptance, E, VU,NR by MR at 8/6/2022 0953                   Point: Precautions (Done)     Description:   Instruct learner(s) on prescribed precautions during self-care and functional transfers.              Learning Progress Summary           Patient Acceptance, E,TB,D, VU,DU,NR by  at 8/10/2022 1439    Acceptance, E, VU,NR by MR at 8/6/2022 0953                   Point: Body mechanics (Done)     Description:   Instruct learner(s) on proper positioning and spine alignment during self-care, functional mobility activities and/or exercises.              Learning Progress  Summary           Patient Acceptance, E,TB,D, VU,DU,NR by KF at 8/10/2022 1439    Acceptance, E, VU,NR by MR at 8/6/2022 0953                               User Key     Initials Effective Dates Name Provider Type Discipline    KF 06/16/21 -  Maricel Talamantes, OT Occupational Therapist OT    MR 10/06/21 -  Michelle Gomes OT Occupational Therapist OT              OT Recommendation and Plan  Therapy Frequency (OT): daily  Plan of Care Review  Plan of Care Reviewed With: patient  Progress: improving  Outcome Evaluation: Pt completed STS Stormy and steps CGA at [facility] progressing well for bed to chair transfer, no LOB, CGA with cues bed mob for sequencing, maxA LBD, setup UBD sitting EOB, cont IPOT per POC recom rehab at d/c     Time Calculation:    Time Calculation- OT     Row Name 08/10/22 1138             Time Calculation- OT    OT Start Time 1138  -KF      OT Received On 08/10/22  -KF              Timed Charges    13506 - OT Therapeutic Activity Minutes 23  -KF              Total Minutes    Timed Charges Total Minutes 23  -KF       Total Minutes 23  -KF            User Key  (r) = Recorded By, (t) = Taken By, (c) = Cosigned By    Initials Name Provider Type     Maricel Talamantes, OT Occupational Therapist              Therapy Charges for Today     Code Description Service Date Service Provider Modifiers Qty    13356093273  OT THERAPEUTIC ACT EA 15 MIN 8/10/2022 Maricel Talamantes OT GO 2               Maricel Talamantes OT  8/10/2022

## 2022-08-10 NOTE — PLAN OF CARE
Goal Outcome Evaluation:      After receiving dose of Xanax, patient able to sleep off and on throughout shift. No acute changes/issues. Monitoring is ongoing.

## 2022-08-10 NOTE — CASE MANAGEMENT/SOCIAL WORK
Continued Stay Note  [facility]     Patient Name: Evelyn Rogers  MRN: [id number]  Today's Date: 8/10/2022    Admit Date: 8/4/2022     Discharge Plan     Row Name 08/10/22 1449       Plan    Plan [facility]    Patient/Family in Agreement with Plan yes    Plan Comments Katy, admissions liaison with [facility], will submit Ms. Rogers to the [facility] physicians for approval. Anticipate she will be able to transfer to [facility] tomorrow if medically ready. Case management will continue to follow.    Final Discharge Disposition Code 62 - inpatient rehab facility               Discharge Codes    No documentation.               Expected Discharge Date and Time     Expected Discharge Date Expected Discharge Time    Aug 11, 2022             Grzegorz Watts RN

## 2022-08-10 NOTE — PROGRESS NOTES
"[facility] Progress Note     LOS: 5 days   Patient Care Team:  Gerry Thorpe MD as PCP - General (Family Medicine)  PCP:  Gerry Thorpe MD    Chief Complaint: Atrial fibrillation    SUBJECTIVE: Resting comfortably in bed.      Review of Systems:   All systems have been reviewed and are negative with the exception of those mentioned above.      OBJECTIVE:    Vital Sign Min/Max for last 24 hours  Temp  Min: 97.8 °F (36.6 °C)  Max: 98.4 °F (36.9 °C)   BP  Min: 94/61  Max: 179/102   Pulse  Min: 60  Max: 81   Resp  Min: 14  Max: 18   SpO2  Min: 90 %  Max: 98 %   No data recorded   No data recorded     Flowsheet Rows    Flowsheet Row First Filed Value   Admission Height 162.6 cm (64\") Documented at 08/04/2022 0807   Admission Weight 74.8 kg (165 lb) Documented at 08/04/2022 0807          Telemetry: Rate controlled atrial fibrillation      Intake/Output Summary (Last 24 hours) at 8/10/2022 1629  Last data filed at 8/10/2022 1350  Gross per 24 hour   Intake 240 ml   Output 550 ml   Net -310 ml     Intake & Output (last 3 days)       08/07 0701  08/08 0700 08/08 0701  08/09 0700 08/09 0701  08/10 0700 08/10 0701  08/11 0700    P.O. 0   240    Total Intake(mL/kg) 0 (0)   240 (3.3)    Urine (mL/kg/hr) 500 (0.3) 350 (0.2) 550 (0.3)     Total Output 500 350 550     Net -500 -350 -550 +240            Urine Unmeasured Occurrence 2 x  1 x     Stool Unmeasured Occurrence 1 x  1 x            Physical Exam:    General Appearance:    Alert, cooperative, no distress, appears stated age   Neck:   Supple, symmetrical, trachea midline.   Lungs:     Clear to auscultation bilaterally, respirations unlabored   Chest Wall:    No tenderness or deformity    Heart:   Irregularly irregular, S1 and S2 normal, no murmur, rub   or gallop, normal carotid impulse bilaterally without bruit.   Extremities:   Extremities normal, atraumatic, no cyanosis or edema   Pulses:   2+ and symmetric all " extremities   Skin:   Skin color, texture, turgor normal, no rashes or lesions      LABS/DIAGNOSTIC DATA:  Results from last 7 days   Lab Units 08/05/22  0833 08/04/22  0819   WBC 10*3/mm3 3.93 5.58   HEMOGLOBIN g/dL 12.4 15.5   HEMATOCRIT % 36.1 46.3   PLATELETS 10*3/mm3 114* 148     Lab Results   Lab Value Date/Time    TROPONINT <0.010 08/04/2022 0819         Results from last 7 days   Lab Units 08/05/22  0833 08/04/22  0819   SODIUM mmol/L 144 136   POTASSIUM mmol/L 3.8 3.5   CHLORIDE mmol/L 106 97*   CO2 mmol/L 30.0* 20.0*   BUN mg/dL 23 20   CREATININE mg/dL 0.91 1.02*   CALCIUM mg/dL 9.6 10.3   BILIRUBIN mg/dL  --  1.5*   ALK PHOS U/L  --  109   ALT (SGPT) U/L  --  10   AST (SGOT) U/L  --  20   GLUCOSE mg/dL 92 97             Results from last 7 days   Lab Units 08/04/22  0819   TSH uIU/mL 7.780*   FREE T4 ng/dL 1.34           Medication Review:   amitriptyline, 25 mg, Oral, Nightly  apixaban, 5 mg, Oral, Q12H  carvedilol, 3.125 mg, Oral, BID With Meals  donepezil, 10 mg, Oral, Daily  levothyroxine, 88 mcg, Oral, Daily  memantine, 10 mg, Oral, BID  senna-docusate sodium, 2 tablet, Oral, BID  sertraline, 100 mg, Oral, Daily  sodium chloride, 10 mL, Intravenous, Q12H             ASSESSMENT/PLAN:    Fall at home, initial encounter         1.  Permanent atrial fibrillation with adequate ventricular rate control.  Would not pursue aggressive measures and would leave her in atrial fibrillation with a goal of therapy being rate control only.    Not recommending antiarrhythmic therapy at this time.  Requiring any rate controlling agents at this time.    2.  [id number] score of 6: Continue Eliquis.  Will reassess whether she is a stable long-term oral anticoagulation as an outpatient due to her recurrent falls.  She may ultimately benefit from left atrial appendage occlusion with a watchman device.     3.  CAD: Defer ischemic evaluation at this time.    4.  Pulm hypertension, moderate: Associated severe tricuspid  regurgitation and moderate pericardial effusion.  Normal LVEF.  Suspect secondary to chronic elevated left ventricular diastolic filling pressures.  Currently appears euvolemic and compensated.  Recommending further evaluation at this time.      Gerry Lee III, MD   08/10/22  16:29 EDT

## 2022-08-10 NOTE — PROGRESS NOTES
Daily Progress Note  Neurology     LOS: 5 days     Subjective     Chief Complaint: Weakness.    Interval History: No acute issues overnight.  She reports that overall she feels much better today.  Pain that she was complaining about yesterday has reduced significantly.  She reports that sensation in both her feet and legs is also much better today.  She in fact walked few steps 2 or 3 times in the morning with the use of a walker.    ROS: Negative for fever, chest pain or shortness of breath.    Objective     Vital signs in last 24 hours:  Temp:  [97.8 °F (36.6 °C)-98.4 °F (36.9 °C)] 98.4 °F (36.9 °C)  Heart Rate:  [60-81] 68  Resp:  [14-18] 18  BP: ()/() 132/90      Physical Exam:   General: Lying in bed with eyes open. In NAD.     Respiratory: Respirations unlabored   CV: RRR       Neurologic Exam:   Mental status: Awake, alert, Follows commands. Speech fluent   CN: II-XII intact to detailed exam    Motor: Strength full (5/5) throughout in BUE and BLE    Reflexes: 1+ and symmetric throughout          Results Review:    Results from last 7 days   Lab Units 08/05/22  0833   WBC 10*3/mm3 3.93   HEMOGLOBIN g/dL 12.4   HEMATOCRIT % 36.1   PLATELETS 10*3/mm3 114*     Results from last 7 days   Lab Units 08/05/22  0833   SODIUM mmol/L 144   POTASSIUM mmol/L 3.8   CHLORIDE mmol/L 106   CO2 mmol/L 30.0*   BUN mg/dL 23   CREATININE mg/dL 0.91   CALCIUM mg/dL 9.6           Assessment & Plan     81-year-old female with past medical history of A. fib, mild cognitive impairment, hypertension presented with complaint of generalized weakness and fall at home.    -Overall, she is feeling much better today, took part in physical therapy and walked few steps 2-3 times in the morning.  -Generalized pain now that she was complaining yesterday has improved as well.  She does have L4-L5 compression fracture.  -Plan is to  discharge her to [facility] inpatient rehab tomorrow for aggressive OT, PT.  I am hoping that this will help her get to her baseline.  -No further recommendations from neurology standpoint.  Neurology will sign off.  Please call back with any questions or concerns that you may have.    Adrian Herron MD  08/10/22  15:36 EDT

## 2022-08-11 VITALS
WEIGHT: 162 LBS | OXYGEN SATURATION: 98 % | DIASTOLIC BLOOD PRESSURE: 77 MMHG | HEART RATE: 77 BPM | SYSTOLIC BLOOD PRESSURE: 142 MMHG | HEIGHT: 64 IN | TEMPERATURE: 97.5 F | RESPIRATION RATE: 18 BRPM | BODY MASS INDEX: 27.66 KG/M2

## 2022-08-11 PROCEDURE — 97110 THERAPEUTIC EXERCISES: CPT

## 2022-08-11 PROCEDURE — 97530 THERAPEUTIC ACTIVITIES: CPT

## 2022-08-11 PROCEDURE — 97116 GAIT TRAINING THERAPY: CPT

## 2022-08-11 PROCEDURE — 99239 HOSP IP/OBS DSCHRG MGMT >30: CPT | Performed by: FAMILY MEDICINE

## 2022-08-11 RX ORDER — AMOXICILLIN 250 MG
2 CAPSULE ORAL DAILY
Start: 2022-08-11

## 2022-08-11 RX ADMIN — DONEPEZIL HYDROCHLORIDE 10 MG: 5 TABLET ORAL at 09:24

## 2022-08-11 RX ADMIN — APIXABAN 5 MG: 5 TABLET, FILM COATED ORAL at 09:24

## 2022-08-11 RX ADMIN — SERTRALINE 100 MG: 100 TABLET, FILM COATED ORAL at 09:24

## 2022-08-11 RX ADMIN — LEVOTHYROXINE SODIUM 88 MCG: 88 TABLET ORAL at 09:24

## 2022-08-11 RX ADMIN — MEMANTINE 10 MG: 10 TABLET ORAL at 09:24

## 2022-08-11 NOTE — CASE MANAGEMENT/SOCIAL WORK
Case Management Discharge Note      Final Note: Ms. Rogers has been approved to transfer to [facility] into an acute inpatient rehab bed [facility]. Transportation has been arranged through [facility] to pick her up at 1430. Ms. Rogers will need to be at the [address] at 1415. I update her son, Willie, and he is in agreement with this plan. Nurse to call report to [phone].         Selected Continued Care - Admitted Since 8/4/2022     Destination Coordination complete.    Service Provider Selected Services Address Phone Fax Patient Preferred    [facility]  Inpatient Rehabilitation [address] [address] [phone] --          Durable Medical Equipment    No services have been selected for the patient.              Dialysis/Infusion    No services have been selected for the patient.              Home Medical Care    No services have been selected for the patient.              Therapy    No services have been selected for the patient.              Community Resources    No services have been selected for the patient.              Community & DME    No services have been selected for the patient.                  Transportation Services  W/C Van: Other ([facility])    Final Discharge Disposition Code: 62 - inpatient rehab facility

## 2022-08-11 NOTE — DISCHARGE SUMMARY
[facility]  DISCHARGE SUMMARY    Patient Name: Evelyn Rogers  : 1940  MRN: [id number]    Date of Admission: 2022  7:57 AM  Date of Discharge:  22    Primary Care Physician: Gerry Thorpe MD    Consults     Date and Time Order Name Status Description    2022 10:48 AM Inpatient Neurology Consult General Completed     2022 10:17 AM Inpatient Cardiology Consult Completed           Hospital Course     Presenting Problem:   Fall at home, initial encounter [W19.XXXA, Y92.009]    Active Hospital Problems    Diagnosis  POA   • Fall at home, initial encounter [W19.XXXA, Y92.009]  Not Applicable      Resolved Hospital Problems   No resolved problems to display.          Hospital Course:  Evelyn Rogers is a 82 y.o. female with PMH of HFpEF, Afib, Dementia, Lupus and anxiety that presented after a fall at home with increasing debility and presyncope at home.     Multiple Falls at home  Debility   -Pt found down on the floor by EMS, lives at home and uses walker.  Had a fall  about 6 weeks ago and has been declining since.   -MRI shows acute/subacute superior plate compression fracture at L4.  Also shows nondisplaced acute/subacute transverse fracture at the inferior endplate of L5.  This study also shows multilevel degenerative disease.     History of HTN, no longer on any antihypertensives  Orthostatic hypotension  -Most likely this is contributing to weakness and also falls  -Patient also has history of diastolic dysfunction.   -Cardiology has seen and evaluated the patient, patient no longer on Bumex or any antihypertensives     PAF  -Paroxysmal atrial fibrillation and patient was on amiodarone before, which was discontinued recently.     CAD  HFpEF  Pulmonary hypertension  -Echocardiogram which was done on 2022 shows normal ejection fraction, right atrial dilation, severe trace Cuspid valve regurgitation, right ventricular systolic pressure is  elevated estimated between 45 to 55 mmHg, evidence of 1 to 2 cm pericardial effusion.     Dementia  -CT head no acute finding but chronic small vessel ischemia/white matter changes  -cont namenda and aricept    Hypothyroidism  -synthroid     Anxiety  -prn xanax        Day of Discharge     HPI:   BP is stable with SBP's in the upper 90s, patient asymptomatic.  No longer on any antihypertensives.       Vital Signs:   Temp:  [97.4 °F (36.3 °C)-97.8 °F (36.6 °C)] 97.5 °F (36.4 °C)  Heart Rate:  [60-81] 63  Resp:  [18] 18  BP: ()/(47-90) 99/64  Flow (L/min):  [2] 2      Physical Exam:  Constitutional: No acute distress, awake, alert, nontoxic  HENT: Mucous membranes moist  Respiratory: Good effort, nonlabored respirations on RA  Cardiovascular: NSR tele  Gastrointestinal: Soft, nondistended  Musculoskeletal: No overt peripheral edema, normal muscle tone for age  Psychiatric: Appropriate affect, cooperative  Skin: No visible rashes or mottling        Pertinent  and/or Most Recent Results     LAB RESULTS:      Lab 08/05/22  0833   WBC 3.93   HEMOGLOBIN 12.4   HEMATOCRIT 36.1   PLATELETS 114*   NEUTROS ABS 1.99   IMMATURE GRANS (ABS) 0.01   LYMPHS ABS 1.27   MONOS ABS 0.43   EOS ABS 0.17   MCV 97.6*         Lab 08/05/22  0833   SODIUM 144   POTASSIUM 3.8   CHLORIDE 106   CO2 30.0*   ANION GAP 8.0   BUN 23   CREATININE 0.91   EGFR 63.9   GLUCOSE 92   CALCIUM 9.6                         Brief Urine Lab Results  (Last result in the past 365 days)      Color   Clarity   Blood   Leuk Est   Nitrite   Protein   CREAT   Urine HCG        08/05/22 1739 Yellow   Clear   Negative   Trace   Negative   Negative               Microbiology Results (last 10 days)     Procedure Component Value - Date/Time    COVID-19 and FLU A/B PCR - Swab, Nasopharynx [[id number]]  (Normal) Collected: 08/04/22 0912    Lab Status: Final result Specimen: Swab from Nasopharynx Updated: 08/04/22 1006     COVID19 Not Detected     Influenza A PCR Not  Detected     Influenza B PCR Not Detected    Narrative:      Fact sheet for providers: https://www.fda.gov/media/212152/download    Fact sheet for patients: https://www.fda.gov/media/437818/download    Test performed by PCR.          Adult Transthoracic Echo Complete W/ Cont if Necessary Per Protocol    Result Date: 8/7/2022  · Left ventricular ejection fraction appears to be 56 - 60%. Left ventricular systolic function is low normal. · Left atrial volume is moderately increased. · The right atrial cavity is severely dilated. · There is mild calcification of the aortic valve. · Severe tricuspid valve regurgitation is present. · Estimated right ventricular systolic pressure from tricuspid regurgitation is moderately elevated (45-55 mmHg). · There is a moderate (1-2cm) pericardial effusion. There is no evidence of cardiac tamponade.      CT Abdomen Pelvis Without Contrast    Result Date: 8/4/2022  CT CHEST WO CONTRAST DIAGNOSTIC-, CT ABDOMEN PELVIS WO CONTRAST-  Date of Exam: 8/4/2022 9:20 AM  Indication: fall, noac, chest, rib, back and shoulder pain.  Comparison: 02/20/2019  Technique: Contiguous axial CT images were obtained from the top of the lungs to the pubic symphysis without contrast. Sagittal and coronal reconstructions were performed.  Automated exposure control and iterative reconstruction methods were used.    FINDINGS:  Study somewhat limited by lack of IV contrast in the setting of trauma.  Chest:  Mediastinum: Heart size is enlarged similar to the prior study. Again noted is a small pericardial effusion.  Limited noncontrast imaging of the aorta is normal in caliber and somewhat tortuous in its course. Atherosclerotic changes are noted. Origin of great vessels demonstrate no acute abnormality. Limited imaging of the base of the neck is unremarkable  Main pulmonary artery is normal in caliber. No suspicious hilar or mediastinal adenopathy is noted. The esophagus appears grossly unremarkable in  appearance.  Coronary arteries demonstrate extensive coronary artery calcification similar to the prior exam.  Lungs/pleura: Central airways are patent. Small bilateral pleural effusions are noted.  Evaluation of the lung parenchyma is slightly compromised by motion. No pneumothorax is noted.  Groundglass opacity within the left upper lobe anteriorly measuring 8 mm appear stable from the comparison. Lungs are otherwise grossly clear.  Soft Tissues/Bones: Mild S-shaped curvature of the thoracolumbar spine appears similar to the prior study. Vertebral body height and alignment are preserved. Osteopenia limits detection of subtle abnormalities. No acute osseous abnormality is appreciated given the limitations of exam. Multilevel degenerative changes noted of the spine.  Abdomen/Pelvis:  Organs: 6.9 cm low-attenuation lesion within the liver again noted likely representing a cyst. Limited noncontrast imaging of the liver demonstrates no acute abnormality. Patient status post cholecystectomy.  Limited noncontrast imaging of the spleen, pancreas, kidneys and adrenal glands are grossly unremarkable in appearance  GI/Bowel: There appears be a small hiatal hernia. Stomach is decompressed and otherwise grossly unremarkable in appearance.  Small bowel demonstrates no acute abnormality.  Defects within the anterior abdominal wall are noted containing fat. Small area of bowel extends into an area relative diastases without evidence of obstruction.  The colon demonstrates a moderate stool burden without acute inflammatory change. Cecum is displaced superiorly and anteriorly. The appendix is not visualized.  No free fluid is noted within the mesentery.  Pelvis: Patient is status post hysterectomy.  Urinary bladder is grossly unremarkable. Ovaries are not visualized.  Peritoneum/Retroperitoneum: Atherosclerotic changes are noted of the aorta which is normal in caliber. No suspicious retroperitoneal adenopathy is noted.  Bones/Soft  Tissues: There is an age-indeterminate compression deformity of the L4 vertebral body with approximately 25-30% loss of height posteriorly and 2925% loss of height anteriorly. Central loss of height of the L2 vertebral body is also age-indeterminate. No paraspinal hematoma noted.       1. Study limited by lack of IV contrast in the setting of trauma 2. No acute traumatic abnormality appreciated within the thorax.   3. Cardiomegaly and small pericardial effusion similar to prior study 4. Small bilateral pleural effusions 5. Age-indeterminate compression deformities of the L2 and L4 vertebral bodies. 6. No definite acute traumatic abnormality otherwise noted of the abdomen and pelvis. 7. Fat-containing ventral hernias 8. No definite acute abnormality on limited noncontrast imaging otherwise noted within the abdomen and pelvis.   This report was finalized on 8/4/2022 10:43 AM by Gary Minaya.      CT Head Without Contrast    Result Date: 8/4/2022  CT HEAD WO CONTRAST-  Date of Exam: 8/4/2022 9:20 AM  Indication: fall, dizziness, noac.  Comparison: None available.  Technique:  Contiguous axial CT images of the head were obtained without contrast from skull base to vertex.  Coronal and sagittal reconstructions were performed.  Automated exposure control and iterative reconstruction methods were used.   FINDINGS: Brain/Ventricles: No acute intracranial hemorrhage or mass effect is noted. There is a mild degree of atrophy and mild white matter changes which are not unexpected for patient's stated age.  No suspicious extra-axial fluid collections are noted.  Orbits: The visualized portion of the orbits demonstrate no acute abnormality.  Sinuses:There is an air-fluid level within the sphenoid sinus posteriorly. No obvious acute osseous abnormality noted.  Soft Tissues/Skull: No definite acute osseous abnormality noted. Small to moderate-sized left frontal scalp contusion noted.       1. No acute intracranial hemorrhage  or mass effect 2. White matter changes compatible with small vessel ischemic disease and atrophy which are not unexpected for patient's stated age 3. Small air-fluid level within the sphenoid sinus. Findings favored represent acute sinusitis. No obvious fracture is identified. 4. Left frontal scalp contusion  This report was finalized on 8/4/2022 10:18 AM by Gary Minaya.      CT Chest Without Contrast Diagnostic    Result Date: 8/4/2022  CT CHEST WO CONTRAST DIAGNOSTIC-, CT ABDOMEN PELVIS WO CONTRAST-  Date of Exam: 8/4/2022 9:20 AM  Indication: fall, noac, chest, rib, back and shoulder pain.  Comparison: 02/20/2019  Technique: Contiguous axial CT images were obtained from the top of the lungs to the pubic symphysis without contrast. Sagittal and coronal reconstructions were performed.  Automated exposure control and iterative reconstruction methods were used.    FINDINGS:  Study somewhat limited by lack of IV contrast in the setting of trauma.  Chest:  Mediastinum: Heart size is enlarged similar to the prior study. Again noted is a small pericardial effusion.  Limited noncontrast imaging of the aorta is normal in caliber and somewhat tortuous in its course. Atherosclerotic changes are noted. Origin of great vessels demonstrate no acute abnormality. Limited imaging of the base of the neck is unremarkable  Main pulmonary artery is normal in caliber. No suspicious hilar or mediastinal adenopathy is noted. The esophagus appears grossly unremarkable in appearance.  Coronary arteries demonstrate extensive coronary artery calcification similar to the prior exam.  Lungs/pleura: Central airways are patent. Small bilateral pleural effusions are noted.  Evaluation of the lung parenchyma is slightly compromised by motion. No pneumothorax is noted.  Groundglass opacity within the left upper lobe anteriorly measuring 8 mm appear stable from the comparison. Lungs are otherwise grossly clear.  Soft Tissues/Bones: Mild  S-shaped curvature of the thoracolumbar spine appears similar to the prior study. Vertebral body height and alignment are preserved. Osteopenia limits detection of subtle abnormalities. No acute osseous abnormality is appreciated given the limitations of exam. Multilevel degenerative changes noted of the spine.  Abdomen/Pelvis:  Organs: 6.9 cm low-attenuation lesion within the liver again noted likely representing a cyst. Limited noncontrast imaging of the liver demonstrates no acute abnormality. Patient status post cholecystectomy.  Limited noncontrast imaging of the spleen, pancreas, kidneys and adrenal glands are grossly unremarkable in appearance  GI/Bowel: There appears be a small hiatal hernia. Stomach is decompressed and otherwise grossly unremarkable in appearance.  Small bowel demonstrates no acute abnormality.  Defects within the anterior abdominal wall are noted containing fat. Small area of bowel extends into an area relative diastases without evidence of obstruction.  The colon demonstrates a moderate stool burden without acute inflammatory change. Cecum is displaced superiorly and anteriorly. The appendix is not visualized.  No free fluid is noted within the mesentery.  Pelvis: Patient is status post hysterectomy.  Urinary bladder is grossly unremarkable. Ovaries are not visualized.  Peritoneum/Retroperitoneum: Atherosclerotic changes are noted of the aorta which is normal in caliber. No suspicious retroperitoneal adenopathy is noted.  Bones/Soft Tissues: There is an age-indeterminate compression deformity of the L4 vertebral body with approximately 25-30% loss of height posteriorly and 2925% loss of height anteriorly. Central loss of height of the L2 vertebral body is also age-indeterminate. No paraspinal hematoma noted.       1. Study limited by lack of IV contrast in the setting of trauma 2. No acute traumatic abnormality appreciated within the thorax.   3. Cardiomegaly and small pericardial  effusion similar to prior study 4. Small bilateral pleural effusions 5. Age-indeterminate compression deformities of the L2 and L4 vertebral bodies. 6. No definite acute traumatic abnormality otherwise noted of the abdomen and pelvis. 7. Fat-containing ventral hernias 8. No definite acute abnormality on limited noncontrast imaging otherwise noted within the abdomen and pelvis.   This report was finalized on 8/4/2022 10:43 AM by Gary Minaya.      CT Cervical Spine Without Contrast    Result Date: 8/4/2022  DATE OF EXAM: 8/4/2022 9:20 AM  PROCEDURE: CT CERVICAL SPINE WO CONTRAST-  INDICATIONS: fall, neck pain  COMPARISON: No comparisons available.  TECHNIQUE: Routine transaxial slices were obtained through the cervical spine without the administration of intravenous contrast. Reconstructed coronal and sagittal images were also obtained. Automated exposure control and iterative construction methods were used.  The radiation dose reduction device was turned on for each scan per the ALARA (As Low as Reasonably Achievable) protocol.  FINDINGS: Cortical margins are intact and vertebral body heights are maintained, without evidence of acute fracture or traumatic malalignment. The paraspinal soft tissues are unremarkable and the lung apices are grossly clear. Multilevel spondylosis change is evident, with areas of disc osteophyte complex formation and facet arthropathy, appearing likely most advanced at C4-5 and C5-6. The dens is intact. Craniocervical alignment appears maintained.      Multilevel spondylosis, without evidence of acute fracture or traumatic malalignment.  This report was finalized on 8/4/2022 11:06 AM by Shar Wilkins.      MRI Lumbar Spine Without Contrast    Result Date: 8/5/2022  Examination: MRI LUMBAR SPINE WO CONTRAST-  Date of Exam: 8/5/2022 8:36 PM  Indication: fall, compression fx; W19.XXXA-Unspecified fall, initial encounter; Y92.009-Unspecified place in unspecified non-institutional  (private) residence as the place of occurrence of the external cause; S80.02XA-Contusion of left knee, initial encounter; S80.12XA-Contusion of left lower leg, initial encounter; S80.01XA-Contusion of right knee, initial encounter; S80.11XA-Contusion of right lower leg, i.  Comparison: CT abdomen and pelvis without contrast 08/04/2022  Technique: Standard noncontrast MR pulse sequences of the lumbar spine were obtained.  Findings: There are 5 lumbar-type vertebral bodies. There is a mild superior endplate compression fracture at L4 with 30% height loss. There is a nondisplaced fracture at the inferior endplate of L5 without significant height loss. The remaining lumbar vertebral bodies and visualized lower thoracic vertebral bodies are maintained in height. The conus medullaris terminates at the T12-L1 level. There is no paraspinal fluid collection. Kidneys are without hydronephrosis. The abdominal aorta is without aneurysm. Negative for marrow placement process.  T12-L1: There is no disc bulge. Mild facet hypertrophy. Negative for canal or foraminal stenosis.  L1-2: There is disc desiccation with mild bulging of the disc. Mild left facet arthritis. Negative for canal stenosis. Mild bilateral foraminal stenosis.  L2-3: There is mild bulging of the disc. Mild asymmetric left facet arthritis and ligament flavum thickening. Negative for canal stenosis. Mild bilateral foraminal stenosis.  L3-4: There is slight retropulsion of the superior endplate of L4 into the canal by 4 mm without significant central canal stenosis. Moderate bilateral facet arthritis. Negative for foraminal stenosis.  L4-5: There is no significant disc bulge. Moderate bilateral facet arthritis. Mild asymmetric left ligamentum flavum thickening. Negative for canal stenosis. No foraminal stenosis.  L5-S1: There is no significant disc bulge. Moderate bilateral facet arthritis. Negative for canal or foraminal stenosis.      1. Mild acute/subacute 30%  superior plate compression fracture at L4. Minimal retropulsion of the superior endplate into canal without significant canal stenosis. 2. Nondisplaced acute/subacute transverse fracture at inferior endplate of L5. 3. Multilevel degenerative findings above without high-grade canal or foraminal stenosis.  This report was finalized on 8/5/2022 11:20 PM by Alfonso Conde MD.      XR Chest 1 View    Result Date: 8/4/2022  DATE OF EXAM: 8/4/2022 8:19 AM  PROCEDURE: XR CHEST 1 VW-  INDICATIONS: Weak/Dizzy/AMS triage protocol  COMPARISON: 2/20/2019  TECHNIQUE: Single radiographic AP view of the chest was obtained.  FINDINGS: Cardiomegaly. Pulmonary vasculature are within normal limits. Thoracic aorta tortuous. Lungs clear. Costophrenic angles sharp      Cardiomegaly with no acute cardiopulmonary process demonstrated  This report was finalized on 8/4/2022 8:44 AM by Arsh Hamlin.      CT Lower Extremity Left Without Contrast    Result Date: 8/4/2022  DATE OF EXAM: 8/4/2022 9:20 AM  PROCEDURE: CT LOWER EXTREMITY LEFT WO CONTRAST-, CT LOWER EXTREMITY RIGHT WO CONTRAST-  INDICATIONS: knees to feet, pain, trauma  COMPARISON: None  TECHNIQUE: CT of the bilateral lower extremities from the distal femurs through the feet obtained without the administration of contrast. Coronal and sagittal reformats were obtained. Automated exposure control and alternative reconstruction methods were used.  The radiation dose reduction device was turned on for each scan per the ALARA (As Low as Reasonably Achievable) protocol.  FINDINGS: There is fat stranding and irregular soft tissue density in the subcutaneous fat of the anterior left anterior proximal leg (series 3 image 61), likely soft tissue contusion. Otherwise the soft tissues are unremarkable. There is no evidence of knee joint effusion. The bones are severely demineralized limiting assessment for occult nondisplaced fractures. No acute fracture or traumatic malalignment. Somewhat  limited assessment of the bony structures of the ankle and feet owing to large field-of-view imaging and consider dedicated radiographs as warranted. There is moderate to severe tricompartmental osteoarthritic changes within the bilateral knees.      No acute fracture or traumatic malalignment. Soft tissue contusion of the anterior left knee and proximal leg.  This report was finalized on 8/4/2022 10:56 AM by Zeke Kitchen MD.      CT Lower Extremity Right Without Contrast    Result Date: 8/4/2022  DATE OF EXAM: 8/4/2022 9:20 AM  PROCEDURE: CT LOWER EXTREMITY LEFT WO CONTRAST-, CT LOWER EXTREMITY RIGHT WO CONTRAST-  INDICATIONS: knees to feet, pain, trauma  COMPARISON: None  TECHNIQUE: CT of the bilateral lower extremities from the distal femurs through the feet obtained without the administration of contrast. Coronal and sagittal reformats were obtained. Automated exposure control and alternative reconstruction methods were used.  The radiation dose reduction device was turned on for each scan per the ALARA (As Low as Reasonably Achievable) protocol.  FINDINGS: There is fat stranding and irregular soft tissue density in the subcutaneous fat of the anterior left anterior proximal leg (series 3 image 61), likely soft tissue contusion. Otherwise the soft tissues are unremarkable. There is no evidence of knee joint effusion. The bones are severely demineralized limiting assessment for occult nondisplaced fractures. No acute fracture or traumatic malalignment. Somewhat limited assessment of the bony structures of the ankle and feet owing to large field-of-view imaging and consider dedicated radiographs as warranted. There is moderate to severe tricompartmental osteoarthritic changes within the bilateral knees.      No acute fracture or traumatic malalignment. Soft tissue contusion of the anterior left knee and proximal leg.  This report was finalized on 8/4/2022 10:56 AM by Zeke Kitchen MD.                Results  for orders placed during the hospital encounter of 08/04/22    Adult Transthoracic Echo Complete W/ Cont if Necessary Per Protocol    Interpretation Summary  · Left ventricular ejection fraction appears to be 56 - 60%. Left ventricular systolic function is low normal.  · Left atrial volume is moderately increased.  · The right atrial cavity is severely dilated.  · There is mild calcification of the aortic valve.  · Severe tricuspid valve regurgitation is present.  · Estimated right ventricular systolic pressure from tricuspid regurgitation is moderately elevated (45-55 mmHg).  · There is a moderate (1-2cm) pericardial effusion. There is no evidence of cardiac tamponade.        Discharge Details        Discharge Medications      New Medications      Instructions Start Date   sennosides-docusate 8.6-50 MG per tablet  Commonly known as: PERICOLACE   2 tablets, Oral, Daily         Continue These Medications      Instructions Start Date   alendronate 70 MG tablet  Commonly known as: FOSAMAX   1 tablet, Oral, Every 7 Days, Saturdays      ALPRAZolam 0.5 MG tablet  Commonly known as: XANAX   0.5 mg, Oral, Daily PRN      amitriptyline 25 MG tablet  Commonly known as: ELAVIL   1 tablet, Oral, Every Night at Bedtime      apixaban 5 MG tablet tablet  Commonly known as: ELIQUIS   1 tablet, Oral, Every 12 Hours Scheduled      baclofen 10 MG tablet  Commonly known as: LIORESAL   10 mg, Oral, 3 Times Daily      cholecalciferol 1.25 MG (21843 UT) capsule  Commonly known as: VITAMIN D3   1 capsule, Oral, Every 7 Days, Saturdays      diphenhydrAMINE-acetaminophen  MG tablet per tablet  Commonly known as: TYLENOL PM   1 tablet, Oral, Nightly PRN      donepezil 10 MG tablet  Commonly known as: ARICEPT   1 tablet, Oral, Every Morning      levothyroxine 88 MCG tablet  Commonly known as: SYNTHROID, LEVOTHROID   1 tablet, Oral, Daily      memantine 10 MG tablet  Commonly known as: NAMENDA   1 tablet, Oral, 2 Times Daily      nystatin  924643 UNIT/GM powder  Commonly known as: MYCOSTATIN   1 application, Topical, 2 Times Daily PRN      potassium chloride 20 MEQ CR tablet  Commonly known as: K-DUR,KLOR-CON   1 tablet, Oral, 2 Times Daily, Takes with Bumex      sertraline 100 MG tablet  Commonly known as: ZOLOFT   1 tablet, Oral, Daily         Stop These Medications    bumetanide 1 MG tablet  Commonly known as: BUMEX            Allergies   Allergen Reactions   • Other Unknown (See Comments)     SOMETHING THAT SHE GOT DURING AN MRI         Diet:  Hospital:  Diet Order   Procedures   • Diet Regular; Cardiac          CODE STATUS:    Code Status and Medical Interventions:   Ordered at: 08/04/22 1651     Level Of Support Discussed With:    Patient     Code Status (Patient has no pulse and is not breathing):    CPR (Attempt to Resuscitate)     Medical Interventions (Patient has pulse or is breathing):    Full Support       No future appointments.    Additional Instructions for the Follow-ups that You Need to Schedule     Discharge Follow-up with PCP   As directed       Currently Documented PCP:    Gerry Thorpe MD    PCP Phone Number:    [phone]     Follow Up Details: upon d/c from [facility] within 1 week                   Nanette Mcwilliams MD  08/11/22      Time Spent on Discharge:  I spent  35  minutes on this discharge activity which included: face-to-face encounter with the patient, reviewing the data in the system, coordination of the care with the nursing staff as well as consultants, documentation, and entering orders.

## 2022-08-11 NOTE — PLAN OF CARE
Goal Outcome Evaluation:  Plan of Care Reviewed With: patient        Progress: improving  Outcome Evaluation: Pt t/f supine > sit without assistance, though needed extra time and effort to complete. Pt t/f from EOB and bathroom toilet with modA with cues for sequencing, then amb 30ft with RW with CGA very slowly, with forward flexed posture. Pt fearful of falling and needs reassurrance for safety. Distance limited by pain and fatigue. VSS on RA. Continue POC.

## 2022-08-11 NOTE — THERAPY TREATMENT NOTE
Patient Name: Evelyn Rogers  : 1940    MRN: [id number]                              Today's Date: 2022       Admit Date: 2022    Visit Dx:     ICD-10-CM ICD-9-CM   1. Fall at home, initial encounter  W19.XXXA E888.9    Y92.009 E849.0   2. Contusion of left knee and lower leg, initial encounter  S80.02XA 924.10    S80.12XA 924.11   3. Contusion of right knee and lower leg, initial encounter  S80.01XA 924.10    S80.11XA 924.11   4. Cervical sprain, initial encounter  S13.9XXA 847.0   5. Compression fx, lumbar spine, closed, initial encounter ([facility])  S32.000A 805.4   6. Decreased ambulation status  Z74.09 780.99     Patient Active Problem List   Diagnosis   • CAP (community acquired pneumonia)   • Acute on chronic congestive heart failure (HCC)   • A-fib ([facility])   • Pleural effusion, bilateral   • Pericardial effusion   • CAD (coronary artery disease)   • Essential hypertension   • Liver nodule   • Bacteriuria   • Fall at home, initial encounter     Past Medical History:   Diagnosis Date   • CAD (coronary artery disease)    • CHF (congestive heart failure) ([facility])    • Hypertension      History reviewed. No pertinent surgical history.   General Information     Row Name 22          Physical Therapy Time and Intention    Document Type therapy note (daily note)  -     Mode of Treatment physical therapy  -     Row Name 22 1123          General Information    Existing Precautions/Restrictions fall;spinal;other (see comments)  L4-5 compression fx  -SJ     Row Name 22          Cognition    Orientation Status (Cognition) oriented x 4  -SJ     Row Name 22          Safety Issues, Functional Mobility    Safety Issues Affecting Function (Mobility) awareness of need for assistance;sequencing abilities;insight into deficits/self-awareness;judgment;safety precaution awareness  -     Impairments Affecting Function (Mobility) balance;coordination;endurance/activity  tolerance;pain;postural/trunk control;sensation/sensory awareness;strength  -SJ     Comment, Safety Issues/Impairments (Mobility) pt fearful of falling  -SJ           User Key  (r) = Recorded By, (t) = Taken By, (c) = Cosigned By    Initials Name Provider Type    Jennifer Baires PT Physical Therapist               Mobility     Row Name 08/11/22 1209          Bed Mobility    Supine-Sit Gila (Bed Mobility) standby assist  -SJ     Assistive Device (Bed Mobility) head of bed elevated  -SJ     Comment, (Bed Mobility) pt able to complete independently with significantly extra time and effort due to pain and fatigue  -SJ     Row Name 08/11/22 1209          Transfers    Comment, (Transfers) Cues for hand placement and sequencing. Pt t/f sit <> stand from EOB and from bathroom toilet.  -SJ     Row Name 08/11/22 1209          Sit-Stand Transfer    Sit-Stand Gila (Transfers) moderate assist (50% patient effort);1 person assist;verbal cues  -SJ     Assistive Device (Sit-Stand Transfers) walker, front-wheeled  -SJ     Row Name 08/11/22 1209          Gait/Stairs (Locomotion)    Gila Level (Gait) contact guard;1 person assist;verbal cues  -SJ     Assistive Device (Gait) walker, front-wheeled  -SJ     Distance in Feet (Gait) 30ft  -SJ     Deviations/Abnormal Patterns (Gait) bilateral deviations;antalgic;dany decreased;gait speed decreased;stride length decreased;weight shifting decreased  -SJ     Bilateral Gait Deviations forward flexed posture;heel strike decreased  -SJ     Comment, (Gait/Stairs) Pt amb very slowly, with forward flexed posture. Pt fearful of falling and needs reassurrance for safety. Distance limited by pain and fatigue.  -SJ           User Key  (r) = Recorded By, (t) = Taken By, (c) = Cosigned By    Initials Name Provider Type    Jennifer Baires PT Physical Therapist               Obj/Interventions     Row Name 08/11/22 1211          Motor Skills    Therapeutic Exercise  hip;knee;ankle  -[facility] Name 08/11/22 1211          Hip (Therapeutic Exercise)    Hip (Therapeutic Exercise) AROM (active range of motion)  -     Hip AROM (Therapeutic Exercise) bilateral;flexion;sitting;10 repetitions  -[facility] Name 08/11/22 1211          Knee (Therapeutic Exercise)    Knee (Therapeutic Exercise) AROM (active range of motion)  -     Knee AROM (Therapeutic Exercise) bilateral;LAQ (long arc quad);10 repetitions;sitting  -[facility] 08/11/22 1211          Ankle (Therapeutic Exercise)    Ankle (Therapeutic Exercise) AROM (active range of motion)  -     Ankle AROM (Therapeutic Exercise) bilateral;dorsiflexion;plantarflexion;sitting;10 repetitions  -[facility] 08/11/22 1211          Balance    Static Sitting Balance independent  -     Dynamic Sitting Balance supervision  -     Position, Sitting Balance unsupported;sitting edge of bed  -     Static Standing Balance standby assist  -     Dynamic Standing Balance minimal assist  -     Position/Device Used, Standing Balance supported;walker, rolling  -           User Key  (r) = Recorded By, (t) = Taken By, (c) = Cosigned By    Initials Name Provider Type    SJ Jennifer Frazier, PT Physical Therapist               Goals/Plan    No documentation.                Clinical Impression     Row Name 08/11/22 1212          Pain    Pain Location generalized  -     Pain Intervention(s) Repositioned;Ambulation/increased activity  -     Additional Documentation Pain Scale: FACES Pre/Post-Treatment (Group)  -[facility] 08/11/22 1212          Pain Scale: FACES Pre/Post-Treatment    Pain: FACES Scale, Pretreatment 2-->hurts little bit  -     Posttreatment Pain Rating 4-->hurts little more  -[facility] 08/11/22 1212          Plan of Care Review    Plan of Care Reviewed With patient  -     Progress improving  -     Outcome Evaluation Pt t/f supine > sit without assistance, though needed extra time and effort to complete.  Pt t/f from EOB and bathroom toilet with modA with cues for sequencing, then amb 30ft with RW with CGA very slowly, with forward flexed posture. Pt fearful of falling and needs reassurrance for safety. Distance limited by pain and fatigue. VSS on RA. Continue POC.  -     Row Name 08/11/22 1212          Vital Signs    Pre Systolic BP Rehab 111  -SJ     Pre Treatment Diastolic BP 70  -SJ     Post Systolic BP Rehab 142  -SJ     Post Treatment Diastolic BP 77  -SJ     Pretreatment Heart Rate (beats/min) 67  -SJ     Posttreatment Heart Rate (beats/min) 80  -SJ     Row Name 08/11/22 1212          Positioning and Restraints    Pre-Treatment Position in bed  -SJ     Post Treatment Position chair  -SJ     In Chair notified nsg;reclined;call light within reach;encouraged to call for assist;exit alarm on;waffle cushion;heels elevated  -           User Key  (r) = Recorded By, (t) = Taken By, (c) = Cosigned By    Initials Name Provider Type    Jennifer Baires PT Physical Therapist               Outcome Measures     Row Name 08/11/22 1214          How much help from another person do you currently need...    Turning from your back to your side while in flat bed without using bedrails? 4  -SJ     Moving from lying on back to sitting on the side of a flat bed without bedrails? 4  -SJ     Moving to and from a bed to a chair (including a wheelchair)? 3  -SJ     Standing up from a chair using your arms (e.g., wheelchair, bedside chair)? 2  -SJ     Climbing 3-5 steps with a railing? 2  -SJ     To walk in hospital room? 3  -SJ     AM-PAC 6 Clicks Score (PT) 18  -SJ     Highest level of mobility 6 --> Walked 10 steps or more  -     Row Name 08/11/22 1214          Functional Assessment    Outcome Measure Options AM-PAC 6 Clicks Basic Mobility (PT)  -           User Key  (r) = Recorded By, (t) = Taken By, (c) = Cosigned By    Initials Name Provider Type    Jennifer Baires PT Physical Therapist                              Physical Therapy Education                 Title: PT OT SLP Therapies (Done)     Topic: Physical Therapy (Done)     Point: Mobility training (Done)     Learning Progress Summary           Patient Acceptance, E,D, VU,NR by  at 8/11/2022 1214    Acceptance, E, VU,NR by  at 8/9/2022 1700    Acceptance, E, NR by MIRZA at 8/5/2022 1100                   Point: Home exercise program (Done)     Learning Progress Summary           Patient Acceptance, E,D, VU,NR by  at 8/11/2022 1214    Acceptance, E, NR by MIRZA at 8/5/2022 1100                   Point: Body mechanics (Done)     Learning Progress Summary           Patient Acceptance, E,D, VU,NR by  at 8/11/2022 1214    Acceptance, E, VU,NR by  at 8/9/2022 1700    Acceptance, E, NR by MIRZA at 8/5/2022 1100                   Point: Precautions (Done)     Learning Progress Summary           Patient Acceptance, E,D, VU,NR by  at 8/11/2022 1214    Acceptance, E, VU,NR by  at 8/9/2022 1700    Acceptance, E, NR by MIRZA at 8/5/2022 1100                               User Key     Initials Effective Dates Name Provider Type Discipline     06/16/21 -  Waleska Perez PT Physical Therapist PT     06/16/21 -  Jennifer Frazier, PT Physical Therapist PT     09/21/21 -  Brit Honeycutt, PT Physical Therapist PT              PT Recommendation and Plan     Plan of Care Reviewed With: patient  Progress: improving  Outcome Evaluation: Pt t/f supine > sit without assistance, though needed extra time and effort to complete. Pt t/f from EOB and bathroom toilet with modA with cues for sequencing, then amb 30ft with RW with CGA very slowly, with forward flexed posture. Pt fearful of falling and needs reassurrance for safety. Distance limited by pain and fatigue. VSS on RA. Continue POC.     Time Calculation:    PT Charges     Row Name 08/11/22 1215             Time Calculation    Start Time 1123  -SJ      PT Received On 08/11/22  [facility]      PT Goal Re-Cert Due Date 08/15/22  -               Time Calculation- PT    Total Timed Code Minutes- PT 42 minute(s)  -SJ              Timed Charges    98395 - PT Therapeutic Exercise Minutes 8  -SJ      67930 - Gait Training Minutes  22  -SJ      79268 - PT Therapeutic Activity Minutes 12  -SJ              Total Minutes    Timed Charges Total Minutes 42  -SJ       Total Minutes 42  -SJ            User Key  (r) = Recorded By, (t) = Taken By, (c) = Cosigned By    Initials Name Provider Type     Jennifer Frazier PT Physical Therapist              Therapy Charges for Today     Code Description Service Date Service Provider Modifiers Qty    72122708778 HC PT THER PROC EA 15 MIN 8/11/2022 Jennifer Frazier, PT GP 1    47117983785 HC GAIT TRAINING EA 15 MIN 8/11/2022 Jennifer Frazier, PT GP 1    [id number]  PT THERAPEUTIC ACT EA 15 MIN 8/11/2022 Jennifer Frazier, PT GP 1          PT G-Codes  Outcome Measure Options: AM-PAC 6 Clicks Basic Mobility (PT)  AM-PAC 6 Clicks Score (PT): 18  AM-PAC 6 Clicks Score (OT): 16    Jennifer Frazier PT  8/11/2022

## 2023-03-09 NOTE — PLAN OF CARE
Problem: Fall Risk (Adult)  Goal: Identify Related Risk Factors and Signs and Symptoms  Outcome: Outcome(s) achieved Date Met: 02/20/19 02/20/19 2252   Fall Risk (Adult)   Related Risk Factors (Fall Risk) fatigue/slow reaction;gait/mobility problems;environment unfamiliar   Signs and Symptoms (Fall Risk) presence of risk factors      02/20/19 2252   Fall Risk (Adult)   Related Risk Factors (Fall Risk) fatigue/slow reaction;gait/mobility problems;environment unfamiliar   Signs and Symptoms (Fall Risk) presence of risk factors     Goal: Absence of Fall  Outcome: Ongoing (interventions implemented as appropriate)   02/20/19 2252   Fall Risk (Adult)   Absence of Fall making progress toward outcome       Problem: Patient Care Overview  Goal: Plan of Care Review  Outcome: Ongoing (interventions implemented as appropriate)   02/20/19 2252   Coping/Psychosocial   Plan of Care Reviewed With patient   Plan of Care Review   Progress no change       Problem: Pneumonia (Adult)  Goal: Signs and Symptoms of Listed Potential Problems Will be Absent, Minimized or Managed (Pneumonia)  Outcome: Ongoing (interventions implemented as appropriate)   02/20/19 2252   Goal/Outcome Evaluation   Problems Assessed (Pneumonia) all   Problems Present (Pneumonia) infection progression          Xray Chest 2 Views PA/Lat

## 2024-08-01 ENCOUNTER — HOSPITAL ENCOUNTER (EMERGENCY)
Facility: HOSPITAL | Age: 84
Discharge: HOME | End: 2024-08-02
Attending: EMERGENCY MEDICINE
Payer: MEDICARE

## 2024-08-01 ENCOUNTER — APPOINTMENT (OUTPATIENT)
Dept: GENERAL RADIOLOGY | Facility: HOSPITAL | Age: 84
End: 2024-08-01
Payer: MEDICARE

## 2024-08-01 VITALS
WEIGHT: 166.45 LBS | BODY MASS INDEX: 28.42 KG/M2 | OXYGEN SATURATION: 99 % | DIASTOLIC BLOOD PRESSURE: 117 MMHG | RESPIRATION RATE: 18 BRPM | TEMPERATURE: 98.2 F | SYSTOLIC BLOOD PRESSURE: 143 MMHG | HEART RATE: 91 BPM | HEIGHT: 64 IN

## 2024-08-01 DIAGNOSIS — M25.552 ACUTE HIP PAIN, LEFT: ICD-10-CM
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
## 2024-08-01 DIAGNOSIS — E87.6 HYPOKALEMIA: Primary | ICD-10-CM

## 2024-08-01 LAB
BASOPHILS # BLD AUTO: 0.03 10*3/MM3 (ref 0–0.2)
BASOPHILS NFR BLD AUTO: 0.5 % (ref 0–1.5)
BILIRUB UR QL STRIP: NEGATIVE
CLARITY UR: CLEAR
COLOR UR: YELLOW
DEPRECATED RDW RBC AUTO: 45.2 FL (ref 37–54)
EOSINOPHIL # BLD AUTO: 0.01 10*3/MM3 (ref 0–0.4)
EOSINOPHIL NFR BLD AUTO: 0.2 % (ref 0.3–6.2)
ERYTHROCYTE [DISTWIDTH] IN BLOOD BY AUTOMATED COUNT: 13.1 % (ref 12.3–15.4)
GLUCOSE UR STRIP-MCNC: NEGATIVE MG/DL
HCT VFR BLD AUTO: 36 % (ref 34–46.6)
HGB BLD-MCNC: 12.1 G/DL (ref 12–15.9)
HGB UR QL STRIP.AUTO: NEGATIVE
IMM GRANULOCYTES # BLD AUTO: 0.02 10*3/MM3 (ref 0–0.05)
IMM GRANULOCYTES NFR BLD AUTO: 0.3 % (ref 0–0.5)
KETONES UR QL STRIP: ABNORMAL
LEUKOCYTE ESTERASE UR QL STRIP.AUTO: NEGATIVE
LYMPHOCYTES # BLD AUTO: 0.43 10*3/MM3 (ref 0.7–3.1)
LYMPHOCYTES NFR BLD AUTO: 6.5 % (ref 19.6–45.3)
MCH RBC QN AUTO: 32.2 PG (ref 26.6–33)
MCHC RBC AUTO-ENTMCNC: 33.6 G/DL (ref 31.5–35.7)
MCV RBC AUTO: 95.7 FL (ref 79–97)
MONOCYTES # BLD AUTO: 0.51 10*3/MM3 (ref 0.1–0.9)
MONOCYTES NFR BLD AUTO: 7.7 % (ref 5–12)
NEUTROPHILS NFR BLD AUTO: 5.66 10*3/MM3 (ref 1.7–7)
NEUTROPHILS NFR BLD AUTO: 84.8 % (ref 42.7–76)
NITRITE UR QL STRIP: NEGATIVE
NRBC BLD AUTO-RTO: 0 /100 WBC (ref 0–0.2)
PH UR STRIP.AUTO: 6.5 [PH] (ref 5–8)
PLATELET # BLD AUTO: 142 10*3/MM3 (ref 140–450)
PMV BLD AUTO: 10 FL (ref 6–12)
PROT UR QL STRIP: NEGATIVE
RBC # BLD AUTO: 3.76 10*6/MM3 (ref 3.77–5.28)
SP GR UR STRIP: 1.01 (ref 1–1.03)
UROBILINOGEN UR QL STRIP: ABNORMAL
WBC NRBC COR # BLD AUTO: 6.66 10*3/MM3 (ref 3.4–10.8)

## 2024-08-01 PROCEDURE — 82550 ASSAY OF CK (CPK): CPT | Performed by: EMERGENCY MEDICINE

## 2024-08-01 PROCEDURE — 36415 COLL VENOUS BLD VENIPUNCTURE: CPT

## 2024-08-01 PROCEDURE — 73502 X-RAY EXAM HIP UNI 2-3 VIEWS: CPT

## 2024-08-01 PROCEDURE — 83735 ASSAY OF MAGNESIUM: CPT | Performed by: EMERGENCY MEDICINE

## 2024-08-01 PROCEDURE — 81003 URINALYSIS AUTO W/O SCOPE: CPT | Performed by: EMERGENCY MEDICINE

## 2024-08-01 PROCEDURE — 99284 EMERGENCY DEPT VISIT MOD MDM: CPT

## 2024-08-01 PROCEDURE — 80053 COMPREHEN METABOLIC PANEL: CPT | Performed by: EMERGENCY MEDICINE

## 2024-08-01 PROCEDURE — 85025 COMPLETE CBC W/AUTO DIFF WBC: CPT | Performed by: EMERGENCY MEDICINE

## 2024-08-02 LAB
ALBUMIN SERPL-MCNC: 3.3 G/DL (ref 3.5–5.2)
ALBUMIN/GLOB SERPL: 1.1 G/DL
ALP SERPL-CCNC: 123 U/L (ref 39–117)
ALT SERPL W P-5'-P-CCNC: 9 U/L (ref 1–33)
ANION GAP SERPL CALCULATED.3IONS-SCNC: 10 MMOL/L (ref 5–15)
AST SERPL-CCNC: 15 U/L (ref 1–32)
BILIRUB SERPL-MCNC: 1.5 MG/DL (ref 0–1.2)
BUN SERPL-MCNC: 19 MG/DL (ref 8–23)
BUN/CREAT SERPL: 27.5 (ref 7–25)
CALCIUM SPEC-SCNC: 9.3 MG/DL (ref 8.6–10.5)
CHLORIDE SERPL-SCNC: 108 MMOL/L (ref 98–107)
CK SERPL-CCNC: 265 U/L (ref 20–180)
CO2 SERPL-SCNC: 25 MMOL/L (ref 22–29)
CREAT SERPL-MCNC: 0.69 MG/DL (ref 0.57–1)
EGFRCR SERPLBLD CKD-EPI 2021: 86.8 ML/MIN/1.73
GLOBULIN UR ELPH-MCNC: 2.9 GM/DL
GLUCOSE SERPL-MCNC: 130 MG/DL (ref 65–99)
MAGNESIUM SERPL-MCNC: 1.9 MG/DL (ref 1.6–2.4)
POTASSIUM SERPL-SCNC: 2.9 MMOL/L (ref 3.5–5.2)
PROT SERPL-MCNC: 6.2 G/DL (ref 6–8.5)
SODIUM SERPL-SCNC: 143 MMOL/L (ref 136–145)

## 2024-08-02 RX ORDER — POTASSIUM CHLORIDE 750 MG/1
40 CAPSULE, EXTENDED RELEASE ORAL ONCE
Status: COMPLETED | OUTPATIENT
Start: 2024-08-02 | End: 2024-08-02

## 2024-08-02 RX ADMIN — POTASSIUM CHLORIDE 40 MEQ: 750 CAPSULE, EXTENDED RELEASE ORAL at 00:49

## 2024-08-02 NOTE — ED PROVIDER NOTES
Subjective   History of Present Illness  Patient presents for evaluation of a presumed unwitnessed fall from standing height.  Patient was found down at her nursing home.  Uncertain of her downtime.  EMS was called who brought her to the ER for evaluation.  Patient unable to provide any meaningful history of her own due to reported history of advanced dementia.  Patient complaining of pain in her left hip..    History provided by:  Patient and EMS personnel      Review of Systems    Past Medical History:   Diagnosis Date    CAD (coronary artery disease)     CHF (congestive heart failure)     Hypertension        Allergies   Allergen Reactions    Other Unknown (See Comments)     SOMETHING THAT SHE GOT DURING AN MRI       History reviewed. No pertinent surgical history.    History reviewed. No pertinent family history.    Social History     Socioeconomic History    Marital status:    Tobacco Use    Smoking status: Never    Smokeless tobacco: Never   Substance and Sexual Activity    Alcohol use: No    Drug use: No    Sexual activity: Defer           Objective   Physical Exam  Constitutional:       General: She is not in acute distress.  HENT:      Head: Normocephalic and atraumatic.   Eyes:      Conjunctiva/sclera: Conjunctivae normal.      Pupils: Pupils are equal, round, and reactive to light.   Neck:      Comments: No tenderness to the cervical spine.  No step-offs or deformities.  Normal range of motion    Cardiovascular:      Rate and Rhythm: Normal rate and regular rhythm.      Pulses: Normal pulses.      Heart sounds: No murmur heard.     No gallop.   Pulmonary:      Effort: Pulmonary effort is normal. No respiratory distress.      Breath sounds: No wheezing or rales.   Chest:      Chest wall: No tenderness.   Abdominal:      General: Abdomen is flat. There is no distension.      Tenderness: There is no abdominal tenderness.   Musculoskeletal:         General: No swelling or deformity. Normal range of  motion.      Comments: No tenderness to the thoracic or lumbar spine.  No step-offs or deformities.  Normal range of motion of the bilateral upper extremities without pain.  Normal range of motion of the right lower extremity without pain.  Patient moans in pain with attempted range of motion of the left hip.  Does not appear to have pain with isolated range of motion of the hip and knee     Skin:     General: Skin is warm and dry.      Capillary Refill: Capillary refill takes less than 2 seconds.   Neurological:      Mental Status: She is alert.      Comments: EMV = 4-6-4.  Does not participate in neurologic examination but does move all extremities equally.         Procedures           ED Course  ED Course as of 08/02/24 0300   Fri Aug 02, 2024   0011 Radiographs of the left hip independently interpreted by myself demonstrate no acute bony fracture [KB]   0259 Laboratory workup independently interpreted by myself notable for hypokalemia, mildly elevated CK, no acute renal dysfunction [KB]      ED Course User Index  [KB] Adriano Gates MD                                             Medical Decision Making  Spoke with patient's son Willie over the phone who confirms that patient is at her mental state baseline.    Consider injuries including intracranial injury, left hip fracture or dislocation or contusion.  Lab and imaging studies were conducted.    Potassium repleted,    Discussed test results with patient son over the phone.  Plan to discharge back to living facility    Problems Addressed:  Acute hip pain, left: complicated acute illness or injury  Fall, initial encounter: complicated acute illness or injury  Hypokalemia: complicated acute illness or injury    Amount and/or Complexity of Data Reviewed  Independent Historian: EMS     Details: Prehospital course by EMS  External Data Reviewed: notes.     Details: 8/11/2022 reviewed most recent discharge summary where patient was hospitalized after injuries from a  fall  Labs: ordered. Decision-making details documented in ED Course.  Radiology: ordered and independent interpretation performed. Decision-making details documented in ED Course.  ECG/medicine tests: ordered and independent interpretation performed. Decision-making details documented in ED Course.    Risk  Prescription drug management.  Decision regarding hospitalization.  Diagnosis or treatment significantly limited by social determinants of health.        Final diagnoses:   Hypokalemia   Fall, initial encounter   Acute hip pain, left       ED Disposition  ED Disposition       ED Disposition   Discharge    Condition   Stable    Comment   --             Recent Results (from the past 24 hour(s))   Comprehensive Metabolic Panel    Collection Time: 08/01/24 11:39 PM    Specimen: Blood   Result Value Ref Range    Glucose 130 (H) 65 - 99 mg/dL    BUN 19 8 - 23 mg/dL    Creatinine 0.69 0.57 - 1.00 mg/dL    Sodium 143 136 - 145 mmol/L    Potassium 2.9 (L) 3.5 - 5.2 mmol/L    Chloride 108 (H) 98 - 107 mmol/L    CO2 25.0 22.0 - 29.0 mmol/L    Calcium 9.3 8.6 - 10.5 mg/dL    Total Protein 6.2 6.0 - 8.5 g/dL    Albumin 3.3 (L) 3.5 - 5.2 g/dL    ALT (SGPT) 9 1 - 33 U/L    AST (SGOT) 15 1 - 32 U/L    Alkaline Phosphatase 123 (H) 39 - 117 U/L    Total Bilirubin 1.5 (H) 0.0 - 1.2 mg/dL    Globulin 2.9 gm/dL    A/G Ratio 1.1 g/dL    BUN/Creatinine Ratio 27.5 (H) 7.0 - 25.0    Anion Gap 10.0 5.0 - 15.0 mmol/L    eGFR 86.8 >60.0 mL/min/1.73   Urinalysis With Microscopic If Indicated (No Culture) - Urine, Clean Catch    Collection Time: 08/01/24 11:39 PM    Specimen: Urine, Clean Catch   Result Value Ref Range    Color, UA Yellow Yellow, Straw    Appearance, UA Clear Clear    pH, UA 6.5 5.0 - 8.0    Specific Gravity, UA 1.013 1.001 - 1.030    Glucose, UA Negative Negative    Ketones, UA Trace (A) Negative    Bilirubin, UA Negative Negative    Blood, UA Negative Negative    Protein, UA Negative Negative    Leuk Esterase, UA Negative  Negative    Nitrite, UA Negative Negative    Urobilinogen, UA 1.0 E.U./dL 0.2 - 1.0 E.U./dL   CBC Auto Differential    Collection Time: 08/01/24 11:39 PM    Specimen: Blood   Result Value Ref Range    WBC 6.66 3.40 - 10.80 10*3/mm3    RBC 3.76 (L) 3.77 - 5.28 10*6/mm3    Hemoglobin 12.1 12.0 - 15.9 g/dL    Hematocrit 36.0 34.0 - 46.6 %    MCV 95.7 79.0 - 97.0 fL    MCH 32.2 26.6 - 33.0 pg    MCHC 33.6 31.5 - 35.7 g/dL    RDW 13.1 12.3 - 15.4 %    RDW-SD 45.2 37.0 - 54.0 fl    MPV 10.0 6.0 - 12.0 fL    Platelets 142 140 - 450 10*3/mm3    Neutrophil % 84.8 (H) 42.7 - 76.0 %    Lymphocyte % 6.5 (L) 19.6 - 45.3 %    Monocyte % 7.7 5.0 - 12.0 %    Eosinophil % 0.2 (L) 0.3 - 6.2 %    Basophil % 0.5 0.0 - 1.5 %    Immature Grans % 0.3 0.0 - 0.5 %    Neutrophils, Absolute 5.66 1.70 - 7.00 10*3/mm3    Lymphocytes, Absolute 0.43 (L) 0.70 - 3.10 10*3/mm3    Monocytes, Absolute 0.51 0.10 - 0.90 10*3/mm3    Eosinophils, Absolute 0.01 0.00 - 0.40 10*3/mm3    Basophils, Absolute 0.03 0.00 - 0.20 10*3/mm3    Immature Grans, Absolute 0.02 0.00 - 0.05 10*3/mm3    nRBC 0.0 0.0 - 0.2 /100 WBC   CK    Collection Time: 08/01/24 11:39 PM    Specimen: Blood   Result Value Ref Range    Creatine Kinase 265 (H) 20 - 180 U/L   Magnesium    Collection Time: 08/01/24 11:39 PM    Specimen: Blood   Result Value Ref Range    Magnesium 1.9 1.6 - 2.4 mg/dL     Note: In addition to lab results from this visit, the labs listed above may include labs taken at another facility or during a different encounter within the last 24 hours. Please correlate lab times with ED admission and discharge times for further clarification of the services performed during this visit.    XR Hip With or Without Pelvis 2 - 3 View Left   Final Result   Impression:   1.No acute osseous abnormality.   2.Osteopenia.   3.Mild degenerative changes.            Electronically Signed: Mina Rock MD     8/1/2024 11:31 PM EDT     Workstation ID: [id number]        Vitals:    08/01/24  "2253   BP: (!) 143/117   BP Location: Left arm   Patient Position: Lying   Pulse: 91   Resp: 18   Temp: 98.2 °F (36.8 °C)   TempSrc: Axillary   SpO2: 99%   Weight: 75.5 kg (166 lb 7.2 oz)   Height: 162.6 cm (64.02\")     Medications   potassium chloride (MICRO-K/KLOR-CON) CR capsule (40 mEq Oral Given 8/2/24 0049)     ECG/EMG Results (last 24 hours)       ** No results found for the last 24 hours. **          ECG 12 Lead Pre-Op / Pre-Procedure    (Results Pending)           No follow-up provider specified.       Medication List      No changes were made to your prescriptions during this visit.            Adriano Gates MD  08/02/24 3523    "

## 2024-08-02 NOTE — DISCHARGE INSTRUCTIONS
Use extra caution to avoid falls.  Follow-up with your primary doctor.  Return to the ER as needed for new or worsening symptoms

## 2024-09-07 ENCOUNTER — HOSPITAL ENCOUNTER (EMERGENCY)
Facility: HOSPITAL | Age: 84
Discharge: NURSING, REHAB OR LONG-TERM CARE | End: 2024-09-07
Attending: EMERGENCY MEDICINE
Payer: MEDICARE

## 2024-09-07 ENCOUNTER — APPOINTMENT (OUTPATIENT)
Dept: GENERAL RADIOLOGY | Facility: HOSPITAL | Age: 84
End: 2024-09-07
Payer: MEDICARE

## 2024-09-07 VITALS
SYSTOLIC BLOOD PRESSURE: 105 MMHG | BODY MASS INDEX: 25.11 KG/M2 | HEIGHT: 67 IN | RESPIRATION RATE: 18 BRPM | WEIGHT: 160 LBS | HEART RATE: 79 BPM | OXYGEN SATURATION: 92 % | TEMPERATURE: 97.9 F | DIASTOLIC BLOOD PRESSURE: 73 MMHG

## 2024-09-07 DIAGNOSIS — R79.81 BORDERLINE LOW OXYGEN SATURATION LEVEL: Primary | ICD-10-CM

## 2024-09-07 DIAGNOSIS — Z86.16 HISTORY OF COVID-19: ICD-10-CM

## 2024-09-07 DIAGNOSIS — G30.9 SEVERE ALZHEIMER'S DEMENTIA, UNSPECIFIED TIMING OF DEMENTIA ONSET, UNSPECIFIED WHETHER BEHAVIORAL, PSYCHOTIC, OR MOOD DISTURBANCE OR ANXIETY: ICD-10-CM

## 2024-09-07 DIAGNOSIS — F02.C0 SEVERE ALZHEIMER'S DEMENTIA, UNSPECIFIED TIMING OF DEMENTIA ONSET, UNSPECIFIED WHETHER BEHAVIORAL, PSYCHOTIC, OR MOOD DISTURBANCE OR ANXIETY: ICD-10-CM

## 2024-09-07 DIAGNOSIS — I48.20 CHRONIC ATRIAL FIBRILLATION: ICD-10-CM

## 2024-09-07 LAB
ALBUMIN SERPL-MCNC: 2.9 G/DL (ref 3.5–5.2)
ALBUMIN/GLOB SERPL: 1 G/DL
ALP SERPL-CCNC: 88 U/L (ref 39–117)
ALT SERPL W P-5'-P-CCNC: 10 U/L (ref 1–33)
ANION GAP SERPL CALCULATED.3IONS-SCNC: 9 MMOL/L (ref 5–15)
AST SERPL-CCNC: 17 U/L (ref 1–32)
BASOPHILS # BLD AUTO: 0.06 10*3/MM3 (ref 0–0.2)
BASOPHILS NFR BLD AUTO: 0.8 % (ref 0–1.5)
BILIRUB SERPL-MCNC: 1.1 MG/DL (ref 0–1.2)
BUN SERPL-MCNC: 26 MG/DL (ref 8–23)
BUN/CREAT SERPL: 32.9 (ref 7–25)
CALCIUM SPEC-SCNC: 8.9 MG/DL (ref 8.6–10.5)
CHLORIDE SERPL-SCNC: 117 MMOL/L (ref 98–107)
CO2 SERPL-SCNC: 24 MMOL/L (ref 22–29)
CREAT SERPL-MCNC: 0.79 MG/DL (ref 0.57–1)
D-LACTATE SERPL-SCNC: 1.4 MMOL/L (ref 0.5–2)
DEPRECATED RDW RBC AUTO: 58.5 FL (ref 37–54)
EGFRCR SERPLBLD CKD-EPI 2021: 74.3 ML/MIN/1.73
EOSINOPHIL # BLD AUTO: 0.09 10*3/MM3 (ref 0–0.4)
EOSINOPHIL NFR BLD AUTO: 1.3 % (ref 0.3–6.2)
ERYTHROCYTE [DISTWIDTH] IN BLOOD BY AUTOMATED COUNT: 15.7 % (ref 12.3–15.4)
GLOBULIN UR ELPH-MCNC: 2.8 GM/DL
GLUCOSE SERPL-MCNC: 116 MG/DL (ref 65–99)
HCT VFR BLD AUTO: 40.4 % (ref 34–46.6)
HGB BLD-MCNC: 12.6 G/DL (ref 12–15.9)
HOLD SPECIMEN: NORMAL
IMM GRANULOCYTES # BLD AUTO: 0.1 10*3/MM3 (ref 0–0.05)
IMM GRANULOCYTES NFR BLD AUTO: 1.4 % (ref 0–0.5)
LYMPHOCYTES # BLD AUTO: 1.14 10*3/MM3 (ref 0.7–3.1)
LYMPHOCYTES NFR BLD AUTO: 15.9 % (ref 19.6–45.3)
MCH RBC QN AUTO: 32.1 PG (ref 26.6–33)
MCHC RBC AUTO-ENTMCNC: 31.2 G/DL (ref 31.5–35.7)
MCV RBC AUTO: 102.8 FL (ref 79–97)
MONOCYTES # BLD AUTO: 0.38 10*3/MM3 (ref 0.1–0.9)
MONOCYTES NFR BLD AUTO: 5.3 % (ref 5–12)
NEUTROPHILS NFR BLD AUTO: 5.42 10*3/MM3 (ref 1.7–7)
NEUTROPHILS NFR BLD AUTO: 75.3 % (ref 42.7–76)
NRBC BLD AUTO-RTO: 0.3 /100 WBC (ref 0–0.2)
NT-PROBNP SERPL-MCNC: 1043 PG/ML (ref 0–1800)
PLATELET # BLD AUTO: 197 10*3/MM3 (ref 140–450)
PMV BLD AUTO: 9.5 FL (ref 6–12)
POTASSIUM SERPL-SCNC: 3.5 MMOL/L (ref 3.5–5.2)
PROCALCITONIN SERPL-MCNC: 0.05 NG/ML (ref 0–0.25)
PROT SERPL-MCNC: 5.7 G/DL (ref 6–8.5)
QT INTERVAL: 384 MS
QTC INTERVAL: 448 MS
RBC # BLD AUTO: 3.93 10*6/MM3 (ref 3.77–5.28)
SODIUM SERPL-SCNC: 150 MMOL/L (ref 136–145)
TROPONIN T SERPL HS-MCNC: 38 NG/L
WBC NRBC COR # BLD AUTO: 7.19 10*3/MM3 (ref 3.4–10.8)
WHOLE BLOOD HOLD COAG: NORMAL
WHOLE BLOOD HOLD SPECIMEN: NORMAL

## 2024-09-07 PROCEDURE — 83880 ASSAY OF NATRIURETIC PEPTIDE: CPT | Performed by: EMERGENCY MEDICINE

## 2024-09-07 PROCEDURE — 71045 X-RAY EXAM CHEST 1 VIEW: CPT

## 2024-09-07 PROCEDURE — 84145 PROCALCITONIN (PCT): CPT | Performed by: EMERGENCY MEDICINE

## 2024-09-07 PROCEDURE — 96374 THER/PROPH/DIAG INJ IV PUSH: CPT

## 2024-09-07 PROCEDURE — 83605 ASSAY OF LACTIC ACID: CPT | Performed by: EMERGENCY MEDICINE

## 2024-09-07 PROCEDURE — 94761 N-INVAS EAR/PLS OXIMETRY MLT: CPT

## 2024-09-07 PROCEDURE — 80053 COMPREHEN METABOLIC PANEL: CPT | Performed by: EMERGENCY MEDICINE

## 2024-09-07 PROCEDURE — 25810000003 LACTATED RINGERS SOLUTION: Performed by: EMERGENCY MEDICINE

## 2024-09-07 PROCEDURE — 84484 ASSAY OF TROPONIN QUANT: CPT | Performed by: EMERGENCY MEDICINE

## 2024-09-07 PROCEDURE — 93005 ELECTROCARDIOGRAM TRACING: CPT | Performed by: EMERGENCY MEDICINE

## 2024-09-07 PROCEDURE — 99284 EMERGENCY DEPT VISIT MOD MDM: CPT

## 2024-09-07 PROCEDURE — 94640 AIRWAY INHALATION TREATMENT: CPT

## 2024-09-07 PROCEDURE — 85025 COMPLETE CBC W/AUTO DIFF WBC: CPT | Performed by: EMERGENCY MEDICINE

## 2024-09-07 PROCEDURE — 25010000002 METHYLPREDNISOLONE PER 40 MG: Performed by: EMERGENCY MEDICINE

## 2024-09-07 RX ORDER — SODIUM CHLORIDE 0.9 % (FLUSH) 0.9 %
10 SYRINGE (ML) INJECTION AS NEEDED
Status: DISCONTINUED | OUTPATIENT
Start: 2024-09-07 | End: 2024-09-07 | Stop reason: HOSPADM

## 2024-09-07 RX ORDER — IPRATROPIUM BROMIDE AND ALBUTEROL SULFATE 2.5; .5 MG/3ML; MG/3ML
3 SOLUTION RESPIRATORY (INHALATION) ONCE
Status: COMPLETED | OUTPATIENT
Start: 2024-09-07 | End: 2024-09-07

## 2024-09-07 RX ORDER — METHYLPREDNISOLONE SODIUM SUCCINATE 40 MG/ML
40 INJECTION, POWDER, LYOPHILIZED, FOR SOLUTION INTRAMUSCULAR; INTRAVENOUS ONCE
Status: COMPLETED | OUTPATIENT
Start: 2024-09-07 | End: 2024-09-07

## 2024-09-07 RX ADMIN — SODIUM CHLORIDE, POTASSIUM CHLORIDE, SODIUM LACTATE AND CALCIUM CHLORIDE 500 ML: 600; 310; 30; 20 INJECTION, SOLUTION INTRAVENOUS at 17:05

## 2024-09-07 RX ADMIN — IPRATROPIUM BROMIDE AND ALBUTEROL SULFATE 3 ML: 2.5; .5 SOLUTION RESPIRATORY (INHALATION) at 17:22

## 2024-09-07 RX ADMIN — METHYLPREDNISOLONE SODIUM SUCCINATE 40 MG: 40 INJECTION, POWDER, FOR SOLUTION INTRAMUSCULAR; INTRAVENOUS at 17:04

## 2024-09-07 NOTE — ED PROVIDER NOTES
Subjective   History of Present Illness  Patient is an 84-year-old female presenting to the emergency department via EMS secondary to episode of hypoxemia noted at her long-term care facility.  Patient apparently was diagnosed with COVID about a week ago.  She has been doing otherwise well.  Nursing reports states that the patient was not on oxygen, but review of the patient's MAR demonstrates that she is on 2 L via nasal cannula.  EMS reports that the facility noted that her oxygen saturation was briefly in the 80s.  EMS reports that the patient was in the 90s throughout their transport.  No intervention was required.  EMS also noted possible atrial fibrillation and stated that they did not see that in her history.  Chart review does demonstrate a history of dementia, coronary disease, CHF, and hypertension.  EMS states that the patient's baseline mental status is overall nonverbal.  Patient is currently at the baseline.    History provided by:  Patient and EMS personnel  History limited by:  Dementia and patient nonverbal      Review of Systems    Past Medical History:   Diagnosis Date    CAD (coronary artery disease)     CHF (congestive heart failure)     Hypertension        Allergies   Allergen Reactions    Other Unknown (See Comments)     SOMETHING THAT SHE GOT DURING AN MRI       No past surgical history on file.    No family history on file.    Social History     Socioeconomic History    Marital status:    Tobacco Use    Smoking status: Never    Smokeless tobacco: Never   Substance and Sexual Activity    Alcohol use: No    Drug use: No    Sexual activity: Defer           Objective   Physical Exam  Vitals and nursing note reviewed.   Constitutional:       General: She is not in acute distress.     Appearance: She is ill-appearing. She is not toxic-appearing.   Cardiovascular:      Rate and Rhythm: Normal rate. Rhythm irregular.   Pulmonary:      Effort: Pulmonary effort is normal. No respiratory  distress.      Breath sounds: No wheezing.   Skin:     General: Skin is warm and dry.      Coloration: Skin is pale.      Findings: Ecchymosis present.   Neurological:      Mental Status: She is alert. Mental status is at baseline.      GCS: GCS eye subscore is 4. GCS verbal subscore is 3. GCS motor subscore is 5.         Procedures           ED Course  ED Course as of 09/08/24 0156   Sat Sep 07, 2024   1731 XR Chest 1 View  Personally reviewed the single view of the chest.  On my interpretation there is no focal lobar infiltrate visualized. [RS]   1737 Patient in no overt distress.  Patient has maintain oxygen saturations in the 90s since that she was here in the emergency department.  The monitor and EKG demonstrate atrial fibrillation and when compared to prior his chronic for the patient.  No indication for admission to the hospital at this point.  Patient with evidence of COVID-19 and some hypoxemia.  We will discharge the patient back to her long-term care facility.  As needed oxygen as needed for support. [RS]   1738 Note to patient: The 21st Century Cures Act makes medical notes like these available to patients in the interest of transparency. However, be advised this is a medical document. It is intended as peer to peer communication. It is written in medical language and may contain abbreviations or verbiage that are unfamiliar. It may appear blunt or direct. Medical documents are intended to carry relevant information, facts as evident, and the clinical opinion of the physician/NPP.   [RS]      ED Course User Index  [RS] Zeke Hanley MD                                             Medical Decision Making  Problems Addressed:  Borderline low oxygen saturation level: complicated acute illness or injury  Chronic atrial fibrillation: complicated acute illness or injury  History of COVID-19: complicated acute illness or injury  Severe Alzheimer's dementia, unspecified timing of dementia onset,  unspecified whether behavioral, psychotic, or mood disturbance or anxiety: complicated acute illness or injury    Amount and/or Complexity of Data Reviewed  Labs: ordered.  Radiology: ordered. Decision-making details documented in ED Course.  ECG/medicine tests: ordered.    Risk  Prescription drug management.        Final diagnoses:   Borderline low oxygen saturation level   History of COVID-19   Severe Alzheimer's dementia, unspecified timing of dementia onset, unspecified whether behavioral, psychotic, or mood disturbance or anxiety   Chronic atrial fibrillation       ED Disposition  ED Disposition       ED Disposition   Discharge    Condition   Stable    Comment   --               Provider, No Known  [address] EMERGENCY DEPARTMENT  [address] [place] [address] [phone]    As needed, If symptoms worsen or ANY concerns.    [facility] - [place] [address]  [phone]  Schedule an appointment as soon as possible for a visit            Medication List      No changes were made to your prescriptions during this visit.            Zeke Hanley MD  09/08/24 0156

## 2025-04-27 NOTE — PLAN OF CARE
Problem: Adult Inpatient Plan of Care  Goal: Plan of Care Review  Outcome: Ongoing, Progressing  Flowsheets (Taken 8/4/2022 2306)  Progress: no change  Plan of Care Reviewed With: patient  Outcome Evaluation: patient admitted from ER, VSS, on room air, fall and skin precautions in place   Goal Outcome Evaluation:  Plan of Care Reviewed With: patient        Progress: no change  Outcome Evaluation: patient admitted from ER, VSS, on room air, fall and skin precautions in place   I have personally evaluated and examined the patient. The Attending was available to me as a supervising provider if needed. Attending Attestation (For Attendings USE Only)...